# Patient Record
Sex: FEMALE | Race: WHITE | HISPANIC OR LATINO | Employment: UNEMPLOYED | URBAN - METROPOLITAN AREA
[De-identification: names, ages, dates, MRNs, and addresses within clinical notes are randomized per-mention and may not be internally consistent; named-entity substitution may affect disease eponyms.]

---

## 2018-03-04 ENCOUNTER — APPOINTMENT (EMERGENCY)
Dept: RADIOLOGY | Facility: HOSPITAL | Age: 43
End: 2018-03-04
Payer: COMMERCIAL

## 2018-03-04 ENCOUNTER — HOSPITAL ENCOUNTER (EMERGENCY)
Facility: HOSPITAL | Age: 43
Discharge: HOME | End: 2018-03-04
Attending: EMERGENCY MEDICINE | Admitting: EMERGENCY MEDICINE
Payer: COMMERCIAL

## 2018-03-04 VITALS
RESPIRATION RATE: 14 BRPM | HEART RATE: 90 BPM | WEIGHT: 155 LBS | DIASTOLIC BLOOD PRESSURE: 82 MMHG | SYSTOLIC BLOOD PRESSURE: 130 MMHG | HEIGHT: 64 IN | TEMPERATURE: 98.2 F | BODY MASS INDEX: 26.46 KG/M2 | OXYGEN SATURATION: 98 %

## 2018-03-04 DIAGNOSIS — R73.9 HYPERGLYCEMIA: ICD-10-CM

## 2018-03-04 DIAGNOSIS — J20.9 ACUTE BRONCHITIS: Primary | ICD-10-CM

## 2018-03-04 DIAGNOSIS — R07.89 CHEST TIGHTNESS: ICD-10-CM

## 2018-03-04 LAB
ALBUMIN SERPL BCP-MCNC: 3 G/DL (ref 3.5–5)
ALP SERPL-CCNC: 76 U/L (ref 46–116)
ALT SERPL W P-5'-P-CCNC: 31 U/L (ref 12–78)
ANION GAP SERPL CALCULATED.3IONS-SCNC: 11 MMOL/L (ref 4–13)
AST SERPL W P-5'-P-CCNC: 19 U/L (ref 5–45)
ATRIAL RATE: 80 BPM
BASOPHILS # BLD AUTO: 0.03 THOUSANDS/ΜL (ref 0–0.1)
BASOPHILS NFR BLD AUTO: 1 % (ref 0–1)
BILIRUB DIRECT SERPL-MCNC: 0.18 MG/DL (ref 0–0.2)
BILIRUB SERPL-MCNC: 1.1 MG/DL (ref 0.2–1)
BUN SERPL-MCNC: 9 MG/DL (ref 5–25)
CALCIUM SERPL-MCNC: 9.1 MG/DL (ref 8.3–10.1)
CHLORIDE SERPL-SCNC: 101 MMOL/L (ref 100–108)
CLARITY, POC: CLEAR
CO2 SERPL-SCNC: 24 MMOL/L (ref 21–32)
COLOR, POC: YELLOW
CREAT SERPL-MCNC: 0.92 MG/DL (ref 0.6–1.3)
EOSINOPHIL # BLD AUTO: 0.11 THOUSAND/ΜL (ref 0–0.61)
EOSINOPHIL NFR BLD AUTO: 2 % (ref 0–6)
ERYTHROCYTE [DISTWIDTH] IN BLOOD BY AUTOMATED COUNT: 11.5 % (ref 11.6–15.1)
EXT BILIRUBIN, UA: NEGATIVE
EXT BLOOD URINE: NEGATIVE
EXT GLUCOSE, UA: NORMAL
EXT KETONES: NEGATIVE
EXT NITRITE, UA: NEGATIVE
EXT PH, UA: 6.5
EXT PREG TEST URINE: NEGATIVE
EXT PROTEIN, UA: NEGATIVE
EXT SPECIFIC GRAVITY, UA: 1.01
EXT UROBILINOGEN: 0.2
GFR SERPL CREATININE-BSD FRML MDRD: 78 ML/MIN/1.73SQ M
GLUCOSE SERPL-MCNC: 317 MG/DL (ref 65–140)
GLUCOSE SERPL-MCNC: 336 MG/DL (ref 65–140)
HCT VFR BLD AUTO: 41.3 % (ref 34.8–46.1)
HGB BLD-MCNC: 15.2 G/DL (ref 11.5–15.4)
LYMPHOCYTES # BLD AUTO: 2.77 THOUSANDS/ΜL (ref 0.6–4.47)
LYMPHOCYTES NFR BLD AUTO: 46 % (ref 14–44)
MCH RBC QN AUTO: 32.8 PG (ref 26.8–34.3)
MCHC RBC AUTO-ENTMCNC: 36.8 G/DL (ref 31.4–37.4)
MCV RBC AUTO: 89 FL (ref 82–98)
MONOCYTES # BLD AUTO: 0.4 THOUSAND/ΜL (ref 0.17–1.22)
MONOCYTES NFR BLD AUTO: 7 % (ref 4–12)
NEUTROPHILS # BLD AUTO: 2.68 THOUSANDS/ΜL (ref 1.85–7.62)
NEUTS SEG NFR BLD AUTO: 45 % (ref 43–75)
NRBC BLD AUTO-RTO: 0 /100 WBCS
P AXIS: 61 DEGREES
PLATELET # BLD AUTO: 237 THOUSANDS/UL (ref 149–390)
PMV BLD AUTO: 10.1 FL (ref 8.9–12.7)
POTASSIUM SERPL-SCNC: 3.7 MMOL/L (ref 3.5–5.3)
PR INTERVAL: 136 MS
PROT SERPL-MCNC: 7.6 G/DL (ref 6.4–8.2)
QRS AXIS: 80 DEGREES
QRSD INTERVAL: 84 MS
QT INTERVAL: 404 MS
QTC INTERVAL: 465 MS
RBC # BLD AUTO: 4.64 MILLION/UL (ref 3.81–5.12)
SODIUM SERPL-SCNC: 136 MMOL/L (ref 136–145)
T WAVE AXIS: 57 DEGREES
TROPONIN I SERPL-MCNC: <0.02 NG/ML
VENTRICULAR RATE: 80 BPM
WBC # BLD AUTO: 6.02 THOUSAND/UL (ref 4.31–10.16)
WBC # BLD EST: NEGATIVE 10*3/UL

## 2018-03-04 PROCEDURE — 80048 BASIC METABOLIC PNL TOTAL CA: CPT | Performed by: PHYSICIAN ASSISTANT

## 2018-03-04 PROCEDURE — 36415 COLL VENOUS BLD VENIPUNCTURE: CPT | Performed by: PHYSICIAN ASSISTANT

## 2018-03-04 PROCEDURE — 71046 X-RAY EXAM CHEST 2 VIEWS: CPT

## 2018-03-04 PROCEDURE — 96374 THER/PROPH/DIAG INJ IV PUSH: CPT

## 2018-03-04 PROCEDURE — 82948 REAGENT STRIP/BLOOD GLUCOSE: CPT

## 2018-03-04 PROCEDURE — 81025 URINE PREGNANCY TEST: CPT | Performed by: PHYSICIAN ASSISTANT

## 2018-03-04 PROCEDURE — 85025 COMPLETE CBC W/AUTO DIFF WBC: CPT | Performed by: PHYSICIAN ASSISTANT

## 2018-03-04 PROCEDURE — 93005 ELECTROCARDIOGRAM TRACING: CPT

## 2018-03-04 PROCEDURE — 80076 HEPATIC FUNCTION PANEL: CPT | Performed by: PHYSICIAN ASSISTANT

## 2018-03-04 PROCEDURE — 81002 URINALYSIS NONAUTO W/O SCOPE: CPT | Performed by: PHYSICIAN ASSISTANT

## 2018-03-04 PROCEDURE — 96361 HYDRATE IV INFUSION ADD-ON: CPT

## 2018-03-04 PROCEDURE — 94640 AIRWAY INHALATION TREATMENT: CPT

## 2018-03-04 PROCEDURE — 99285 EMERGENCY DEPT VISIT HI MDM: CPT

## 2018-03-04 PROCEDURE — 93010 ELECTROCARDIOGRAM REPORT: CPT | Performed by: INTERNAL MEDICINE

## 2018-03-04 PROCEDURE — 84484 ASSAY OF TROPONIN QUANT: CPT | Performed by: PHYSICIAN ASSISTANT

## 2018-03-04 RX ORDER — KETOROLAC TROMETHAMINE 30 MG/ML
15 INJECTION, SOLUTION INTRAMUSCULAR; INTRAVENOUS ONCE
Status: COMPLETED | OUTPATIENT
Start: 2018-03-04 | End: 2018-03-04

## 2018-03-04 RX ORDER — ALBUTEROL SULFATE 2.5 MG/3ML
5 SOLUTION RESPIRATORY (INHALATION) ONCE
Status: COMPLETED | OUTPATIENT
Start: 2018-03-04 | End: 2018-03-04

## 2018-03-04 RX ADMIN — ALBUTEROL SULFATE 5 MG: 2.5 SOLUTION RESPIRATORY (INHALATION) at 15:20

## 2018-03-04 RX ADMIN — KETOROLAC TROMETHAMINE 15 MG: 30 INJECTION, SOLUTION INTRAMUSCULAR at 16:54

## 2018-03-04 RX ADMIN — SODIUM CHLORIDE 1000 ML: 0.9 INJECTION, SOLUTION INTRAVENOUS at 16:51

## 2018-03-04 RX ADMIN — METFORMIN HYDROCHLORIDE 1000 MG: 500 TABLET, FILM COATED ORAL at 18:59

## 2018-03-04 RX ADMIN — IPRATROPIUM BROMIDE 0.5 MG: 0.5 SOLUTION RESPIRATORY (INHALATION) at 15:20

## 2018-03-04 NOTE — DISCHARGE INSTRUCTIONS
Return to the Emergency Department sooner if increased pain, fever, vomiting, difficulty breathing, rash  Acute Bronchitis   WHAT YOU NEED TO KNOW:   Acute bronchitis is swelling and irritation in the air passages of your lungs  This irritation may cause you to cough or have other breathing problems  Acute bronchitis often starts because of another illness, such as a cold or the flu  The illness spreads from your nose and throat to your windpipe and airways  Bronchitis is often called a chest cold  Acute bronchitis lasts about 3 to 6 weeks and is usually not a serious illness  Your cough can last for several weeks  DISCHARGE INSTRUCTIONS:   Return to the emergency department if:   · You cough up blood  · Your lips or fingernails turn blue  · You feel like you are not getting enough air when you breathe  Contact your healthcare provider if:   · You have a fever  · Your breathing problems do not go away or get worse  · Your cough does not get better within 4 weeks  · You have questions or concerns about your condition or care  Self-care:   · Get more rest   Rest helps your body to heal  Slowly start to do more each day  Rest when you feel it is needed  · Avoid irritants in the air  Avoid chemicals, fumes, and dust  Wear a face mask if you must work around dust or fumes  Stay inside on days when air pollution levels are high  If you have allergies, stay inside when pollen counts are high  Do not use aerosol products, such as spray-on deodorant, bug spray, and hair spray  · Do not smoke or be around others who smoke  Nicotine and other chemicals in cigarettes and cigars damages the cilia that move mucus out of your lungs  Ask your healthcare provider for information if you currently smoke and need help to quit  E-cigarettes or smokeless tobacco still contain nicotine  Talk to your healthcare provider before you use these products  · Drink liquids as directed    Liquids help keep your air passages moist and help you cough up mucus  You may need to drink more liquids when you have acute bronchitis  Ask how much liquid to drink each day and which liquids are best for you  · Use a humidifier or vaporizer  Use a cool mist humidifier or a vaporizer to increase air moisture in your home  This may make it easier for you to breathe and help decrease your cough  Decrease risk for acute bronchitis:   · Get the vaccinations you need  Ask your healthcare provider if you should get vaccinated against the flu or pneumonia  · Prevent the spread of germs  You can decrease your risk of acute bronchitis and other illnesses by doing the following:     [facility] your hands often with soap and water  Carry germ-killing hand lotion or gel with you  You can use the lotion or gel to clean your hands when soap and water are not available  ¨ Do not touch your eyes, nose, or mouth unless you have washed your hands first     ¨ Always cover your mouth when you cough to prevent the spread of germs  It is best to cough into a tissue or your shirt sleeve instead of into your hand  Ask those around you cover their mouths when they cough  ¨ Try to avoid people who have a cold or the flu  If you are sick, stay away from others as much as possible  Medicines: Your healthcare provider may  give you any of the following:  · Ibuprofen or acetaminophen  are medicines that help lower your fever  They are available without a doctor's order  Ask your healthcare provider which medicine is right for you  Ask how much to take and how often to take it  Follow directions  These medicines can cause stomach bleeding if not taken correctly  Ibuprofen can cause kidney damage  Do not take ibuprofen if you have kidney disease, an ulcer, or allergies to aspirin  Acetaminophen can cause liver damage  Do not take more than 4,000 milligrams in 24 hours  · Decongestants  help loosen mucus in your lungs and make it easier to cough up  This can help you breathe easier  · Cough suppressants  decrease your urge to cough  If your cough produces mucus, do not take a cough suppressant unless your healthcare provider tells you to  Your healthcare provider may suggest that you take a cough suppressant at night so you can rest     · Inhalers  may be given  Your healthcare provider may give you one or more inhalers to help you breathe easier and cough less  An inhaler gives your medicine to open your airways  Ask your healthcare provider to show you how to use your inhaler correctly  · Take your medicine as directed  Contact your healthcare provider if you think your medicine is not helping or if you have side effects  Tell him of her if you are allergic to any medicine  Keep a list of the medicines, vitamins, and herbs you take  Include the amounts, and when and why you take them  Bring the list or the pill bottles to follow-up visits  Carry your medicine list with you in case of an emergency  Follow up with your healthcare provider as directed:  Write down questions you have so you will remember to ask them during your follow-up visits  © 2017 [address] [employer] Information is for End User's use only and may not be sold, redistributed or otherwise used for commercial purposes  All illustrations and images included in CareNotes® are the copyrighted property of [employer]  or [employer]  The above information is an  only  It is not intended as medical advice for individual conditions or treatments  Talk to your doctor, nurse or pharmacist before following any medical regimen to see if it is safe and effective for you  Chest Pain   WHAT YOU NEED TO KNOW:   What causes chest pain? Chest pain can be caused by a range of conditions, from not serious to life-threatening  Chest pain can be a symptom of a digestive problem, such as acid reflux or a stomach ulcer   An anxiety attack or a strong emotion, such as anger, can also cause chest pain  Infection, inflammation, or a fracture in the bones or cartilage in your chest can cause pain or discomfort  Sometimes chest pain or pressure is caused by poor blood flow to your heart (angina)  Chest pain may also be caused by life-threatening conditions such as a heart attack or blood clot in your lungs  What other symptoms might I have with chest pain? · A burning feeling behind your breastbone    · A racing or slow heartbeat     · Fever or sweating     · Nausea or vomiting     · Shortness of breath     · Discomfort or pressure that spreads from your chest to your back, jaw, or arm     · Feeling weak, tired, or faint  How is the cause of chest pain diagnosed? Your healthcare provider will examine you  Describe your chest pain in as much detail as possible  Tell him or her where your pain is and when it began  Tell the provider if you notice anything that makes the pain worse or better  Tell him or her if it is constant or comes and goes  Your healthcare provider will ask about any medicines you use and medical conditions you have  He or she will also examine you  You may also need any of the following tests:  · An EKG  is a test that records your heart's electrical activity  · Blood tests  check for heart damage and signs of a heart attack  · An echocardiogram  uses sound waves to see if blood is flowing normally through your heart  · An ultrasound, x-ray, CT, or MRI scan  may show the cause of your chest pain  You may be given contrast liquid to help your heart show up better in the pictures  Tell the healthcare provider if you have ever had an allergic reaction to contrast liquid  Do not enter the MRI room with anything metal  Metal can cause serious injury  Tell the healthcare provider if you have any metal in or on your body  · An endoscopy  may be done to check for ulcers or problem with your esophagus  How is chest pain treated?   Medicines may be given to treat the cause of your chest pain  Examples include pain medicine, anxiety medicine, or medicines to increase blood flow to your heart  Do not  take certain medicines without asking your healthcare provider first  These include NSAIDs, herbal or vitamin supplements, or hormones (estrogen or progestin)  What are some healthy living tips? The following are general healthy guidelines  If your chest pain is caused by a heart problem, your healthcare provider will give you specific guidelines to follow  · Do not smoke  Nicotine and other chemicals in cigarettes and cigars can cause lung and heart damage  Ask your healthcare provider for information if you currently smoke and need help to quit  E-cigarettes or smokeless tobacco still contain nicotine  Talk to your healthcare provider before you use these products  · Eat a variety of healthy, low-fat foods  Healthy foods include fruits, vegetables, whole-grain breads, low-fat dairy products, beans, lean meats, and fish  Ask for more information about a heart healthy diet  · Ask about activity  Your healthcare provider will tell you which activities to limit or avoid  Ask when you can drive, return to work, and have sex  Ask about the best exercise plan for you  · Maintain a healthy weight  Ask your healthcare provider how much you should weigh  Ask him or her to help you create a weight loss plan if you are overweight  Call 911 if:   · You have any of the following signs of a heart attack:      ¨ Squeezing, pressure, or pain in your chest that lasts longer than 5 minutes or returns    ¨ Discomfort or pain in your back, neck, jaw, stomach, or arm     ¨ Trouble breathing    ¨ Nausea or vomiting    ¨ Lightheadedness or a sudden cold sweat, especially with chest pain or trouble breathing    When should I seek immediate care? · You have chest discomfort that gets worse, even with medicine  · You cough or vomit blood       · Your bowel movements are black or bloody  · You cannot stop vomiting, or it hurts to swallow  When should I contact my healthcare provider? · You have questions or concerns about your condition or care  CARE AGREEMENT:   You have the right to help plan your care  Learn about your health condition and how it may be treated  Discuss treatment options with your caregivers to decide what care you want to receive  You always have the right to refuse treatment  The above information is an  only  It is not intended as medical advice for individual conditions or treatments  Talk to your doctor, nurse or pharmacist before following any medical regimen to see if it is safe and effective for you  © 2017 [address] [facility] Information is for End User's use only and may not be sold, redistributed or otherwise used for commercial purposes  All illustrations and images included in CareNotes® are the copyrighted property of A D A M , Inc  or [employer]

## 2018-10-19 ENCOUNTER — HOSPITAL ENCOUNTER (INPATIENT)
Facility: HOSPITAL | Age: 43
LOS: 6 days | Discharge: HOME | DRG: 392 | End: 2018-10-27
Attending: EMERGENCY MEDICINE | Admitting: INTERNAL MEDICINE
Payer: COMMERCIAL

## 2018-10-19 ENCOUNTER — APPOINTMENT (EMERGENCY)
Dept: CT IMAGING | Facility: HOSPITAL | Age: 43
DRG: 392 | End: 2018-10-19
Payer: COMMERCIAL

## 2018-10-19 DIAGNOSIS — R10.9 ABDOMINAL PAIN: ICD-10-CM

## 2018-10-19 DIAGNOSIS — K63.9 BOWEL WALL THICKENING: ICD-10-CM

## 2018-10-19 DIAGNOSIS — K52.9 GASTROENTERITIS, ACUTE: ICD-10-CM

## 2018-10-19 DIAGNOSIS — K52.9 COLITIS, ACUTE: ICD-10-CM

## 2018-10-19 DIAGNOSIS — R73.9 HYPERGLYCEMIA: ICD-10-CM

## 2018-10-19 DIAGNOSIS — R19.7 DIARRHEA: ICD-10-CM

## 2018-10-19 DIAGNOSIS — Z79.4 TYPE 2 DIABETES MELLITUS WITH HYPERGLYCEMIA, WITH LONG-TERM CURRENT USE OF INSULIN (HCC): ICD-10-CM

## 2018-10-19 DIAGNOSIS — K52.9 ENTERITIS: Primary | ICD-10-CM

## 2018-10-19 DIAGNOSIS — K76.9 HEPATIC LESION: ICD-10-CM

## 2018-10-19 DIAGNOSIS — E11.65 TYPE 2 DIABETES MELLITUS WITH HYPERGLYCEMIA, WITH LONG-TERM CURRENT USE OF INSULIN (HCC): ICD-10-CM

## 2018-10-19 LAB
ACETONE SERPL-MCNC: NEGATIVE MG/DL
ALBUMIN SERPL BCP-MCNC: 3.8 G/DL (ref 3.5–5)
ALP SERPL-CCNC: 85 U/L (ref 46–116)
ALT SERPL W P-5'-P-CCNC: 35 U/L (ref 12–78)
ANION GAP SERPL CALCULATED.3IONS-SCNC: 14 MMOL/L (ref 4–13)
AST SERPL W P-5'-P-CCNC: 13 U/L (ref 5–45)
BACTERIA UR QL AUTO: ABNORMAL /HPF
BASE EX.OXY STD BLDV CALC-SCNC: 49.5 % (ref 60–80)
BASE EXCESS BLDV CALC-SCNC: -4 MMOL/L
BASOPHILS # BLD AUTO: 0.02 THOUSANDS/ΜL (ref 0–0.1)
BASOPHILS NFR BLD AUTO: 0 % (ref 0–1)
BILIRUB SERPL-MCNC: 3.5 MG/DL (ref 0.2–1)
BILIRUB UR QL STRIP: ABNORMAL
BUN SERPL-MCNC: 12 MG/DL (ref 5–25)
CALCIUM SERPL-MCNC: 8.9 MG/DL (ref 8.3–10.1)
CHLORIDE SERPL-SCNC: 94 MMOL/L (ref 100–108)
CLARITY UR: CLEAR
CO2 SERPL-SCNC: 23 MMOL/L (ref 21–32)
COLOR UR: YELLOW
CREAT SERPL-MCNC: 0.95 MG/DL (ref 0.6–1.3)
EOSINOPHIL # BLD AUTO: 0.12 THOUSAND/ΜL (ref 0–0.61)
EOSINOPHIL NFR BLD AUTO: 2 % (ref 0–6)
ERYTHROCYTE [DISTWIDTH] IN BLOOD BY AUTOMATED COUNT: 11.5 % (ref 11.6–15.1)
EXT PREG TEST URINE: NEGATIVE
GFR SERPL CREATININE-BSD FRML MDRD: 74 ML/MIN/1.73SQ M
GLUCOSE SERPL-MCNC: 347 MG/DL (ref 65–140)
GLUCOSE UR STRIP-MCNC: ABNORMAL MG/DL
HCO3 BLDV-SCNC: 22.2 MMOL/L (ref 24–30)
HCT VFR BLD AUTO: 47.3 % (ref 34.8–46.1)
HGB BLD-MCNC: 17.4 G/DL (ref 11.5–15.4)
HGB UR QL STRIP.AUTO: NEGATIVE
IMM GRANULOCYTES # BLD AUTO: 0.04 THOUSAND/UL (ref 0–0.2)
IMM GRANULOCYTES NFR BLD AUTO: 1 % (ref 0–2)
KETONES UR STRIP-MCNC: ABNORMAL MG/DL
LEUKOCYTE ESTERASE UR QL STRIP: ABNORMAL
LIPASE SERPL-CCNC: 85 U/L (ref 73–393)
LYMPHOCYTES # BLD AUTO: 1.52 THOUSANDS/ΜL (ref 0.6–4.47)
LYMPHOCYTES NFR BLD AUTO: 24 % (ref 14–44)
MCH RBC QN AUTO: 33.5 PG (ref 26.8–34.3)
MCHC RBC AUTO-ENTMCNC: 36.8 G/DL (ref 31.4–37.4)
MCV RBC AUTO: 91 FL (ref 82–98)
MONOCYTES # BLD AUTO: 0.69 THOUSAND/ΜL (ref 0.17–1.22)
MONOCYTES NFR BLD AUTO: 11 % (ref 4–12)
NEUTROPHILS # BLD AUTO: 3.86 THOUSANDS/ΜL (ref 1.85–7.62)
NEUTS SEG NFR BLD AUTO: 62 % (ref 43–75)
NITRITE UR QL STRIP: NEGATIVE
NON-SQ EPI CELLS URNS QL MICRO: ABNORMAL /HPF
NRBC BLD AUTO-RTO: 0 /100 WBCS
O2 CT BLDV-SCNC: 13 ML/DL
PCO2 BLDV: 43.9 MM HG (ref 42–50)
PH BLDV: 7.32 [PH] (ref 7.3–7.4)
PH UR STRIP.AUTO: 6 [PH] (ref 4.5–8)
PLATELET # BLD AUTO: 231 THOUSANDS/UL (ref 149–390)
PMV BLD AUTO: 10.4 FL (ref 8.9–12.7)
PO2 BLDV: 25.9 MM HG (ref 35–45)
POTASSIUM SERPL-SCNC: 3.7 MMOL/L (ref 3.5–5.3)
PROT SERPL-MCNC: 8.3 G/DL (ref 6.4–8.2)
PROT UR STRIP-MCNC: NEGATIVE MG/DL
RBC # BLD AUTO: 5.19 MILLION/UL (ref 3.81–5.12)
RBC #/AREA URNS AUTO: ABNORMAL /HPF
SODIUM SERPL-SCNC: 131 MMOL/L (ref 136–145)
SP GR UR STRIP.AUTO: 1.01 (ref 1–1.03)
TROPONIN I SERPL-MCNC: <0.02 NG/ML
UROBILINOGEN UR QL STRIP.AUTO: 0.2 E.U./DL
WBC # BLD AUTO: 6.25 THOUSAND/UL (ref 4.31–10.16)
WBC #/AREA URNS AUTO: ABNORMAL /HPF

## 2018-10-19 PROCEDURE — 96375 TX/PRO/DX INJ NEW DRUG ADDON: CPT

## 2018-10-19 PROCEDURE — 96361 HYDRATE IV INFUSION ADD-ON: CPT

## 2018-10-19 PROCEDURE — 83930 ASSAY OF BLOOD OSMOLALITY: CPT | Performed by: EMERGENCY MEDICINE

## 2018-10-19 PROCEDURE — 81001 URINALYSIS AUTO W/SCOPE: CPT | Performed by: EMERGENCY MEDICINE

## 2018-10-19 PROCEDURE — 85025 COMPLETE CBC W/AUTO DIFF WBC: CPT | Performed by: EMERGENCY MEDICINE

## 2018-10-19 PROCEDURE — 83690 ASSAY OF LIPASE: CPT | Performed by: EMERGENCY MEDICINE

## 2018-10-19 PROCEDURE — 36415 COLL VENOUS BLD VENIPUNCTURE: CPT | Performed by: EMERGENCY MEDICINE

## 2018-10-19 PROCEDURE — 81025 URINE PREGNANCY TEST: CPT | Performed by: EMERGENCY MEDICINE

## 2018-10-19 PROCEDURE — 80053 COMPREHEN METABOLIC PANEL: CPT | Performed by: EMERGENCY MEDICINE

## 2018-10-19 PROCEDURE — 84484 ASSAY OF TROPONIN QUANT: CPT | Performed by: EMERGENCY MEDICINE

## 2018-10-19 PROCEDURE — 82009 KETONE BODYS QUAL: CPT | Performed by: EMERGENCY MEDICINE

## 2018-10-19 PROCEDURE — 74177 CT ABD & PELVIS W/CONTRAST: CPT

## 2018-10-19 PROCEDURE — 99285 EMERGENCY DEPT VISIT HI MDM: CPT

## 2018-10-19 PROCEDURE — 82805 BLOOD GASES W/O2 SATURATION: CPT | Performed by: EMERGENCY MEDICINE

## 2018-10-19 PROCEDURE — 96374 THER/PROPH/DIAG INJ IV PUSH: CPT

## 2018-10-19 RX ORDER — LOSARTAN POTASSIUM 25 MG/1
TABLET ORAL
COMMUNITY
Start: 2018-06-14 | End: 2020-09-16 | Stop reason: ALTCHOICE

## 2018-10-19 RX ORDER — ATORVASTATIN CALCIUM 80 MG/1
TABLET, FILM COATED ORAL
COMMUNITY
Start: 2018-06-14 | End: 2020-09-16 | Stop reason: SDUPTHER

## 2018-10-19 RX ORDER — ALBUTEROL SULFATE 90 UG/1
AEROSOL, METERED RESPIRATORY (INHALATION)
COMMUNITY
Start: 2018-06-18 | End: 2020-09-16 | Stop reason: SDUPTHER

## 2018-10-19 RX ORDER — DICYCLOMINE HCL 20 MG
20 TABLET ORAL ONCE
Status: COMPLETED | OUTPATIENT
Start: 2018-10-20 | End: 2018-10-20

## 2018-10-19 RX ORDER — LANCETS
EACH MISCELLANEOUS
COMMUNITY
Start: 2018-09-20

## 2018-10-19 RX ORDER — BLOOD-GLUCOSE METER
EACH MISCELLANEOUS
COMMUNITY
Start: 2018-06-14

## 2018-10-19 RX ORDER — ONDANSETRON 2 MG/ML
4 INJECTION INTRAMUSCULAR; INTRAVENOUS ONCE
Status: COMPLETED | OUTPATIENT
Start: 2018-10-19 | End: 2018-10-19

## 2018-10-19 RX ORDER — HYDROMORPHONE HCL/PF 1 MG/ML
0.5 SYRINGE (ML) INJECTION ONCE
Status: COMPLETED | OUTPATIENT
Start: 2018-10-19 | End: 2018-10-19

## 2018-10-19 RX ORDER — MONTELUKAST SODIUM 10 MG/1
TABLET ORAL
COMMUNITY
Start: 2018-06-14 | End: 2020-09-16 | Stop reason: SDUPTHER

## 2018-10-19 RX ORDER — AMITRIPTYLINE HYDROCHLORIDE 50 MG/1
TABLET, FILM COATED ORAL
COMMUNITY
Start: 2018-06-14 | End: 2020-09-16 | Stop reason: SDUPTHER

## 2018-10-19 RX ADMIN — ONDANSETRON 4 MG: 2 INJECTION INTRAMUSCULAR; INTRAVENOUS at 22:23

## 2018-10-19 RX ADMIN — IOHEXOL 100 ML: 350 INJECTION, SOLUTION INTRAVENOUS at 23:23

## 2018-10-19 RX ADMIN — SODIUM CHLORIDE 1000 ML: 0.9 INJECTION, SOLUTION INTRAVENOUS at 22:23

## 2018-10-19 RX ADMIN — HYDROMORPHONE HYDROCHLORIDE 0.5 MG: 1 INJECTION, SOLUTION INTRAMUSCULAR; INTRAVENOUS; SUBCUTANEOUS at 22:23

## 2018-10-20 PROBLEM — Z79.4 TYPE 2 DIABETES MELLITUS WITH HYPERGLYCEMIA, WITH LONG-TERM CURRENT USE OF INSULIN (HCC): Status: ACTIVE | Noted: 2018-10-20

## 2018-10-20 PROBLEM — K76.9 HEPATIC LESION: Status: ACTIVE | Noted: 2018-10-20

## 2018-10-20 PROBLEM — R10.9 ABDOMINAL PAIN: Status: ACTIVE | Noted: 2018-10-20

## 2018-10-20 PROBLEM — K52.9 GASTROENTERITIS, ACUTE: Status: ACTIVE | Noted: 2018-10-20

## 2018-10-20 PROBLEM — R73.9 HYPERGLYCEMIA: Status: ACTIVE | Noted: 2018-10-20

## 2018-10-20 PROBLEM — E11.65 TYPE 2 DIABETES MELLITUS WITH HYPERGLYCEMIA, WITH LONG-TERM CURRENT USE OF INSULIN (HCC): Status: ACTIVE | Noted: 2018-10-20

## 2018-10-20 PROBLEM — R00.0 SINUS TACHYCARDIA: Status: ACTIVE | Noted: 2018-10-20

## 2018-10-20 PROBLEM — R19.7 DIARRHEA: Status: ACTIVE | Noted: 2018-10-20

## 2018-10-20 PROBLEM — E16.2 HYPOGLYCEMIA: Status: ACTIVE | Noted: 2018-10-20

## 2018-10-20 LAB
ANION GAP SERPL CALCULATED.3IONS-SCNC: 12 MMOL/L (ref 4–13)
BASOPHILS # BLD AUTO: 0.02 THOUSANDS/ΜL (ref 0–0.1)
BASOPHILS NFR BLD AUTO: 0 % (ref 0–1)
BUN SERPL-MCNC: 7 MG/DL (ref 5–25)
C DIFF TOX GENS STL QL NAA+PROBE: NORMAL
CALCIUM SERPL-MCNC: 7.6 MG/DL (ref 8.3–10.1)
CAMPYLOBACTER DNA SPEC NAA+PROBE: NORMAL
CHLORIDE SERPL-SCNC: 103 MMOL/L (ref 100–108)
CO2 SERPL-SCNC: 23 MMOL/L (ref 21–32)
CREAT SERPL-MCNC: 0.59 MG/DL (ref 0.6–1.3)
CRP SERPL QL: 64.2 MG/L
EOSINOPHIL # BLD AUTO: 0.17 THOUSAND/ΜL (ref 0–0.61)
EOSINOPHIL NFR BLD AUTO: 4 % (ref 0–6)
ERYTHROCYTE [DISTWIDTH] IN BLOOD BY AUTOMATED COUNT: 11.7 % (ref 11.6–15.1)
ERYTHROCYTE [SEDIMENTATION RATE] IN BLOOD: 11 MM/HOUR (ref 0–20)
GFR SERPL CREATININE-BSD FRML MDRD: 113 ML/MIN/1.73SQ M
GLUCOSE P FAST SERPL-MCNC: 234 MG/DL (ref 65–99)
GLUCOSE SERPL-MCNC: 145 MG/DL (ref 65–140)
GLUCOSE SERPL-MCNC: 180 MG/DL (ref 65–140)
GLUCOSE SERPL-MCNC: 205 MG/DL (ref 65–140)
GLUCOSE SERPL-MCNC: 211 MG/DL (ref 65–140)
GLUCOSE SERPL-MCNC: 234 MG/DL (ref 65–140)
HCT VFR BLD AUTO: 37.9 % (ref 34.8–46.1)
HGB BLD-MCNC: 13.9 G/DL (ref 11.5–15.4)
IMM GRANULOCYTES # BLD AUTO: 0.03 THOUSAND/UL (ref 0–0.2)
IMM GRANULOCYTES NFR BLD AUTO: 1 % (ref 0–2)
LACTATE SERPL-SCNC: 0.9 MMOL/L (ref 0.5–2)
LYMPHOCYTES # BLD AUTO: 2 THOUSANDS/ΜL (ref 0.6–4.47)
LYMPHOCYTES NFR BLD AUTO: 43 % (ref 14–44)
MAGNESIUM SERPL-MCNC: 1.5 MG/DL (ref 1.6–2.6)
MCH RBC QN AUTO: 33.9 PG (ref 26.8–34.3)
MCHC RBC AUTO-ENTMCNC: 36.7 G/DL (ref 31.4–37.4)
MCV RBC AUTO: 92 FL (ref 82–98)
MONOCYTES # BLD AUTO: 0.58 THOUSAND/ΜL (ref 0.17–1.22)
MONOCYTES NFR BLD AUTO: 12 % (ref 4–12)
NEUTROPHILS # BLD AUTO: 1.89 THOUSANDS/ΜL (ref 1.85–7.62)
NEUTS SEG NFR BLD AUTO: 40 % (ref 43–75)
NRBC BLD AUTO-RTO: 0 /100 WBCS
OSMOLALITY UR/SERPL-RTO: 290 MMOL/KG (ref 282–298)
PHOSPHATE SERPL-MCNC: 2.2 MG/DL (ref 2.7–4.5)
PLATELET # BLD AUTO: 181 THOUSANDS/UL (ref 149–390)
PMV BLD AUTO: 10.5 FL (ref 8.9–12.7)
POTASSIUM SERPL-SCNC: 3.1 MMOL/L (ref 3.5–5.3)
RBC # BLD AUTO: 4.1 MILLION/UL (ref 3.81–5.12)
SALMONELLA DNA SPEC QL NAA+PROBE: NORMAL
SHIGA TOXIN STX GENE SPEC NAA+PROBE: NORMAL
SHIGELLA DNA SPEC QL NAA+PROBE: NORMAL
SODIUM SERPL-SCNC: 138 MMOL/L (ref 136–145)
WBC # BLD AUTO: 4.69 THOUSAND/UL (ref 4.31–10.16)

## 2018-10-20 PROCEDURE — 84100 ASSAY OF PHOSPHORUS: CPT | Performed by: HOSPITALIST

## 2018-10-20 PROCEDURE — 86140 C-REACTIVE PROTEIN: CPT | Performed by: HOSPITALIST

## 2018-10-20 PROCEDURE — 82948 REAGENT STRIP/BLOOD GLUCOSE: CPT

## 2018-10-20 PROCEDURE — 80048 BASIC METABOLIC PNL TOTAL CA: CPT | Performed by: HOSPITALIST

## 2018-10-20 PROCEDURE — 36415 COLL VENOUS BLD VENIPUNCTURE: CPT | Performed by: HOSPITALIST

## 2018-10-20 PROCEDURE — 99220 PR INITIAL OBSERVATION CARE/DAY 70 MINUTES: CPT | Performed by: HOSPITALIST

## 2018-10-20 PROCEDURE — 83605 ASSAY OF LACTIC ACID: CPT | Performed by: PHYSICIAN ASSISTANT

## 2018-10-20 PROCEDURE — 89055 LEUKOCYTE ASSESSMENT FECAL: CPT | Performed by: EMERGENCY MEDICINE

## 2018-10-20 PROCEDURE — 87493 C DIFF AMPLIFIED PROBE: CPT | Performed by: EMERGENCY MEDICINE

## 2018-10-20 PROCEDURE — 99204 OFFICE O/P NEW MOD 45 MIN: CPT | Performed by: INTERNAL MEDICINE

## 2018-10-20 PROCEDURE — 83735 ASSAY OF MAGNESIUM: CPT | Performed by: HOSPITALIST

## 2018-10-20 PROCEDURE — 87505 NFCT AGENT DETECTION GI: CPT | Performed by: EMERGENCY MEDICINE

## 2018-10-20 PROCEDURE — 90686 IIV4 VACC NO PRSV 0.5 ML IM: CPT | Performed by: INTERNAL MEDICINE

## 2018-10-20 PROCEDURE — 85652 RBC SED RATE AUTOMATED: CPT | Performed by: HOSPITALIST

## 2018-10-20 PROCEDURE — 85025 COMPLETE CBC W/AUTO DIFF WBC: CPT | Performed by: HOSPITALIST

## 2018-10-20 RX ORDER — FLUTICASONE FUROATE AND VILANTEROL 100; 25 UG/1; UG/1
1 POWDER RESPIRATORY (INHALATION)
Status: DISCONTINUED | OUTPATIENT
Start: 2018-10-20 | End: 2018-10-27 | Stop reason: HOSPADM

## 2018-10-20 RX ORDER — INSULIN GLARGINE 100 [IU]/ML
10 INJECTION, SOLUTION SUBCUTANEOUS
Status: DISCONTINUED | OUTPATIENT
Start: 2018-10-20 | End: 2018-10-21

## 2018-10-20 RX ORDER — DICYCLOMINE HCL 20 MG
20 TABLET ORAL
Status: DISCONTINUED | OUTPATIENT
Start: 2018-10-20 | End: 2018-10-27 | Stop reason: HOSPADM

## 2018-10-20 RX ORDER — CIPROFLOXACIN 500 MG/1
500 TABLET, FILM COATED ORAL EVERY 12 HOURS SCHEDULED
Status: DISCONTINUED | OUTPATIENT
Start: 2018-10-20 | End: 2018-10-27

## 2018-10-20 RX ORDER — ASPIRIN 81 MG/1
81 TABLET, CHEWABLE ORAL DAILY
Status: DISCONTINUED | OUTPATIENT
Start: 2018-10-20 | End: 2018-10-27 | Stop reason: HOSPADM

## 2018-10-20 RX ORDER — SODIUM CHLORIDE 9 MG/ML
100 INJECTION, SOLUTION INTRAVENOUS CONTINUOUS
Status: DISCONTINUED | OUTPATIENT
Start: 2018-10-20 | End: 2018-10-27

## 2018-10-20 RX ORDER — POTASSIUM CHLORIDE 20MEQ/15ML
40 LIQUID (ML) ORAL ONCE
Status: COMPLETED | OUTPATIENT
Start: 2018-10-20 | End: 2018-10-20

## 2018-10-20 RX ORDER — MAGNESIUM SULFATE HEPTAHYDRATE 40 MG/ML
2 INJECTION, SOLUTION INTRAVENOUS ONCE
Status: COMPLETED | OUTPATIENT
Start: 2018-10-20 | End: 2018-10-20

## 2018-10-20 RX ORDER — LOSARTAN POTASSIUM 25 MG/1
25 TABLET ORAL DAILY
Status: DISCONTINUED | OUTPATIENT
Start: 2018-10-20 | End: 2018-10-27 | Stop reason: HOSPADM

## 2018-10-20 RX ORDER — HYDROMORPHONE HCL/PF 1 MG/ML
0.5 SYRINGE (ML) INJECTION
Status: DISCONTINUED | OUTPATIENT
Start: 2018-10-20 | End: 2018-10-25

## 2018-10-20 RX ORDER — ATORVASTATIN CALCIUM 40 MG/1
80 TABLET, FILM COATED ORAL
Status: DISCONTINUED | OUTPATIENT
Start: 2018-10-20 | End: 2018-10-27 | Stop reason: HOSPADM

## 2018-10-20 RX ORDER — METRONIDAZOLE 500 MG/1
500 TABLET ORAL EVERY 8 HOURS SCHEDULED
Status: DISCONTINUED | OUTPATIENT
Start: 2018-10-20 | End: 2018-10-27 | Stop reason: HOSPADM

## 2018-10-20 RX ORDER — MONTELUKAST SODIUM 10 MG/1
10 TABLET ORAL DAILY
Status: DISCONTINUED | OUTPATIENT
Start: 2018-10-20 | End: 2018-10-27 | Stop reason: HOSPADM

## 2018-10-20 RX ORDER — HYDROMORPHONE HCL/PF 1 MG/ML
0.5 SYRINGE (ML) INJECTION ONCE
Status: COMPLETED | OUTPATIENT
Start: 2018-10-20 | End: 2018-10-20

## 2018-10-20 RX ORDER — ACETAMINOPHEN 325 MG/1
650 TABLET ORAL EVERY 6 HOURS PRN
Status: DISCONTINUED | OUTPATIENT
Start: 2018-10-20 | End: 2018-10-27 | Stop reason: HOSPADM

## 2018-10-20 RX ADMIN — SODIUM CHLORIDE 100 ML/HR: 0.9 INJECTION, SOLUTION INTRAVENOUS at 04:36

## 2018-10-20 RX ADMIN — METRONIDAZOLE 500 MG: 500 TABLET ORAL at 14:56

## 2018-10-20 RX ADMIN — CIPROFLOXACIN HYDROCHLORIDE 500 MG: 500 TABLET, FILM COATED ORAL at 22:04

## 2018-10-20 RX ADMIN — DICYCLOMINE HYDROCHLORIDE 20 MG: 20 TABLET ORAL at 15:31

## 2018-10-20 RX ADMIN — INFLUENZA VIRUS VACCINE 0.5 ML: 15; 15; 15; 15 SUSPENSION INTRAMUSCULAR at 14:56

## 2018-10-20 RX ADMIN — HYDROMORPHONE HYDROCHLORIDE 0.5 MG: 1 INJECTION, SOLUTION INTRAMUSCULAR; INTRAVENOUS; SUBCUTANEOUS at 18:40

## 2018-10-20 RX ADMIN — INSULIN LISPRO 1 UNITS: 100 INJECTION, SOLUTION INTRAVENOUS; SUBCUTANEOUS at 18:38

## 2018-10-20 RX ADMIN — CIPROFLOXACIN HYDROCHLORIDE 500 MG: 500 TABLET, FILM COATED ORAL at 12:28

## 2018-10-20 RX ADMIN — ASPIRIN 81 MG 81 MG: 81 TABLET ORAL at 10:05

## 2018-10-20 RX ADMIN — METFORMIN HYDROCHLORIDE 1000 MG: 500 TABLET, FILM COATED ORAL at 08:31

## 2018-10-20 RX ADMIN — LOSARTAN POTASSIUM 25 MG: 25 TABLET, FILM COATED ORAL at 10:57

## 2018-10-20 RX ADMIN — ENOXAPARIN SODIUM 40 MG: 40 INJECTION SUBCUTANEOUS at 10:11

## 2018-10-20 RX ADMIN — INSULIN GLARGINE 10 UNITS: 100 INJECTION, SOLUTION SUBCUTANEOUS at 22:04

## 2018-10-20 RX ADMIN — HYDROMORPHONE HYDROCHLORIDE 0.5 MG: 1 INJECTION, SOLUTION INTRAMUSCULAR; INTRAVENOUS; SUBCUTANEOUS at 13:27

## 2018-10-20 RX ADMIN — METRONIDAZOLE 500 MG: 500 TABLET ORAL at 22:04

## 2018-10-20 RX ADMIN — SODIUM CHLORIDE 1000 ML: 0.9 INJECTION, SOLUTION INTRAVENOUS at 00:54

## 2018-10-20 RX ADMIN — HYDROMORPHONE HYDROCHLORIDE 0.5 MG: 1 INJECTION, SOLUTION INTRAMUSCULAR; INTRAVENOUS; SUBCUTANEOUS at 22:07

## 2018-10-20 RX ADMIN — MAGNESIUM SULFATE IN WATER 2 G: 40 INJECTION, SOLUTION INTRAVENOUS at 15:32

## 2018-10-20 RX ADMIN — POTASSIUM CHLORIDE 40 MEQ: 20 SOLUTION ORAL at 15:31

## 2018-10-20 RX ADMIN — ATORVASTATIN CALCIUM 80 MG: 40 TABLET, FILM COATED ORAL at 17:22

## 2018-10-20 RX ADMIN — DICYCLOMINE HYDROCHLORIDE 20 MG: 20 TABLET ORAL at 22:05

## 2018-10-20 RX ADMIN — DICYCLOMINE HYDROCHLORIDE 20 MG: 20 TABLET ORAL at 00:54

## 2018-10-20 RX ADMIN — DICYCLOMINE HYDROCHLORIDE 20 MG: 20 TABLET ORAL at 11:50

## 2018-10-20 RX ADMIN — SODIUM CHLORIDE 100 ML/HR: 0.9 INJECTION, SOLUTION INTRAVENOUS at 19:32

## 2018-10-20 RX ADMIN — MONTELUKAST SODIUM 10 MG: 10 TABLET, FILM COATED ORAL at 10:57

## 2018-10-20 NOTE — ASSESSMENT & PLAN NOTE
With colitis, probably viral versus inflammatory, patient will be made NPO, consult Gastroenterology, will sent stool for evaluation, and follow up with chemistry and CBC in a m  ESR/C-reactive protein

## 2018-10-20 NOTE — ASSESSMENT & PLAN NOTE
Diffuse and persistent x5 days probably secondary to gastroenteritis versus inflammatory bowel disease, follow-up with GI, will make NPO, status post 2 L of normal saline in the emergency, continue normal saline at 100 cc/hour

## 2018-10-20 NOTE — CONSULTS
Consultation - [facility]) [facility]  Amalia Jalloh 44 y o  female MRN: [id number]  Unit/Bed#: ED 06 Encounter: [id number]        Inpatient consult to gastroenterology  Consult performed by: Elena Hurd ordered by: Jackson Jean          Reason for Consult / Principal Problem:     Reviewed CT scan for further evaluation of colitis      ASSESSMENT AND PLAN:      1  Colitis- likely viral versus bacteria versus parasitic   2  Abdominal pain due to colitis    Likely cause of her colitis is related to the bacterial versus viral infection  Due to acute nature of symptoms this is likely secondary to infectious etiology  We will start antibiotics on her despite not having leukocytosis since she is not improving and her symptoms are somewhat getting worse  Currently can start on clear liquids would avoid dairy and fiber  She could also get started on probiotics  I would check stool studies for community-acquired C diff, bacteria, parasite, Giardia  She can be considered for an outpatient colonoscopy  This will be done in 6-8 weeks after follow-up in the office  Will start clears  Advance diet as tolerated   ______________________________________________________________________    HPI:     She is a very pleasant 67-year-old female presents here with diarrhea  Diarrhea is nonbloody watery has been ongoing for several days  She has had 5 days of diarrhea denies any sick contact, travel, recent antibiotic use, recent hospitalization  She had no prior episode of similar symptoms  She has had a colonoscopy 10 years ago but has not had 1 recently  Currently she presents with abdominal pain associated with colitis  Abdominal pain is diffuse and CT scan showed left-sided colitis  She has had 16 and episode of diarrhea probably 10-15 episodes per day which is not slowing down  Denies any acute distress at this time  She has very limited p o  Intake due to this        REVIEW OF SYSTEMS:    CONSTITUTIONAL: Denies any fever, chills, rigors, and weight loss  HEENT: No earache or tinnitus  Denies hearing loss or visual disturbances  CARDIOVASCULAR: No chest pain or palpitations  RESPIRATORY: Denies any cough, hemoptysis, shortness of breath or dyspnea on exertion  GASTROINTESTINAL: As noted in the History of Present Illness  GENITOURINARY: No problems with urination  Denies any hematuria or dysuria  NEUROLOGIC: No dizziness or vertigo, denies headaches  MUSCULOSKELETAL: Denies any muscle or joint pain  SKIN: Denies skin rashes or itching  ENDOCRINE: Denies excessive thirst  Denies intolerance to heat or cold  PSYCHOSOCIAL: Denies depression or anxiety  Denies any recent memory loss  Historical Information   Past Medical History:   Diagnosis Date    Asthma     [facility]     Fibromyalgia      Past Surgical History:   Procedure Laterality Date     SECTION      CHOLECYSTECTOMY      TUBAL LIGATION       Social History   History   Alcohol Use No     History   Drug Use No     History   Smoking Status    Never Smoker   Smokeless Tobacco    Never Used     History reviewed  No pertinent family history      Meds/Allergies       (Not in a hospital admission)  Current Facility-Administered Medications   Medication Dose Route Frequency    aspirin chewable tablet 81 mg  81 mg Oral Daily    atorvastatin (LIPITOR) tablet 80 mg  80 mg Oral Daily With Dinner    dicyclomine (BENTYL) tablet 20 mg  20 mg Oral 4x Daily (AC & HS)    enoxaparin (LOVENOX) subcutaneous injection 40 mg  40 mg Subcutaneous Daily    fluticasone-vilanterol (BREO ELLIPTA) 100-25 mcg/inh inhaler 1 puff  1 puff Inhalation Daily    losartan (COZAAR) tablet 25 mg  25 mg Oral Daily    metFORMIN (GLUCOPHAGE) tablet 1,000 mg  1,000 mg Oral BID With Meals    montelukast (SINGULAIR) tablet 10 mg  10 mg Oral Daily    sodium chloride 0 9 % infusion  100 mL/hr Intravenous Continuous       No Known Allergies        Objective     Blood pressure 102/63, pulse 96, temperature 98 1 °F (36 7 °C), temperature source Oral, resp  rate 18, weight 72 6 kg (160 lb), last menstrual period 10/07/2018, SpO2 98 %  Body mass index is 27 46 kg/m²  Intake/Output Summary (Last 24 hours) at 10/20/18 1139  Last data filed at 10/20/18 0346   Gross per 24 hour   Intake             2000 ml   Output                0 ml   Net             2000 ml         PHYSICAL EXAM:      General Appearance:   Alert, cooperative, no distress   HEENT:   Normocephalic, atraumatic, anicteric      Neck:  Supple, symmetrical, trachea midline   Lungs:   Clear to auscultation bilaterally; no rales, rhonchi or wheezing; respirations unlabored    Heart[de-identified]   Regular rate and rhythm; no murmur, rub, or gallop  Abdomen:   Soft, diffuse abdominal discomfort, non-distended; normal bowel sounds; no masses, no organomegaly    Genitalia:   Deferred    Rectal:   Deferred    Extremities:  No cyanosis, clubbing or edema    Pulses:  2+ and symmetric all extremities    Skin:  No jaundice, rashes, or lesions    Lymph nodes:  No palpable cervical lymphadenopathy        Lab Results:   Admission on 10/19/2018   Component Date Value    Color, UA 10/19/2018 Yellow     Clarity, UA 10/19/2018 Clear     Specific Gravity, UA 10/19/2018 1 010     pH, UA 10/19/2018 6 0     Leukocytes, UA 10/19/2018 Elevated glucose may cause decreased leukocyte values   See urine microscopic for [facility] result/*    Nitrite, UA 10/19/2018 Negative     Protein, UA 10/19/2018 Negative     Glucose, UA 10/19/2018 >=1000 (1%)*    Ketones, UA 10/19/2018 15 (1+)*    Urobilinogen, UA 10/19/2018 0 2     Bilirubin, UA 10/19/2018 Small*    Blood, UA 10/19/2018 Negative     EXT PREG TEST UR (Ref: N* 10/19/2018 negative     RBC, UA 10/19/2018 None Seen     WBC, UA 10/19/2018 1-2*    Epithelial Cells 10/19/2018 Occasional     Bacteria, UA 10/19/2018 Occasional     WBC 10/19/2018 6 25     RBC 10/19/2018 5 19*    Hemoglobin 10/19/2018 17 4*    Hematocrit 10/19/2018 47 3*    MCV 10/19/2018 91     MCH 10/19/2018 33 5     MCHC 10/19/2018 36 8     RDW 10/19/2018 11 5*    MPV 10/19/2018 10 4     Platelets 56/84/2090 231     nRBC 10/19/2018 0     Neutrophils Relative 10/19/2018 62     Immat GRANS % 10/19/2018 1     Lymphocytes Relative 10/19/2018 24     Monocytes Relative 10/19/2018 11     Eosinophils Relative 10/19/2018 2     Basophils Relative 10/19/2018 0     Neutrophils Absolute 10/19/2018 3 86     Immature Grans Absolute 10/19/2018 0 04     Lymphocytes Absolute 10/19/2018 1 52     Monocytes Absolute 10/19/2018 0 69     Eosinophils Absolute 10/19/2018 0 12     Basophils Absolute 10/19/2018 0 02     Sodium 10/19/2018 131*    Potassium 10/19/2018 3 7     Chloride 10/19/2018 94*    CO2 10/19/2018 23     ANION GAP 10/19/2018 14*    BUN 10/19/2018 12     Creatinine 10/19/2018 0 95     Glucose 10/19/2018 347*    Calcium 10/19/2018 8 9     AST 10/19/2018 13     ALT 10/19/2018 35     Alkaline Phosphatase 10/19/2018 85     Total Protein 10/19/2018 8 3*    Albumin 10/19/2018 3 8     Total Bilirubin 10/19/2018 3 50*    eGFR 10/19/2018 74     Lipase 10/19/2018 85     Troponin I 10/19/2018 <0 02     pH, Adalberto 10/19/2018 7  321     pCO2, Adalberto 10/19/2018 43 9     pO2, Adalberto 10/19/2018 25 9*    HCO3, Adalberto 10/19/2018 22 2*    Base Excess, Adalberto 10/19/2018 -4 0     O2 Content, Adalberto 10/19/2018 13 0     O2 HGB, VENOUS 10/19/2018 49 5*    Acetone, Bld 10/19/2018 Negative     Sodium 10/20/2018 138     Potassium 10/20/2018 3 1*    Chloride 10/20/2018 103     CO2 10/20/2018 23     ANION GAP 10/20/2018 12     BUN 10/20/2018 7     Creatinine 10/20/2018 0 59*    Glucose 10/20/2018 234*    Glucose, Fasting 10/20/2018 234*    Calcium 10/20/2018 7 6*    eGFR 10/20/2018 113     Magnesium 10/20/2018 1 5*    Phosphorus 10/20/2018 2 2*    WBC 10/20/2018 4 69     RBC 10/20/2018 4 10  Hemoglobin 10/20/2018 13 9     Hematocrit 10/20/2018 37 9     MCV 10/20/2018 92     MCH 10/20/2018 33 9     MCHC 10/20/2018 36 7     RDW 10/20/2018 11 7     MPV 10/20/2018 10 5     Platelets 76/54/8634 181     nRBC 10/20/2018 0     Neutrophils Relative 10/20/2018 40*    Immat GRANS % 10/20/2018 1     Lymphocytes Relative 10/20/2018 43     Monocytes Relative 10/20/2018 12     Eosinophils Relative 10/20/2018 4     Basophils Relative 10/20/2018 0     Neutrophils Absolute 10/20/2018 1 89     Immature Grans Absolute 10/20/2018 0 03     Lymphocytes Absolute 10/20/2018 2 00     Monocytes Absolute 10/20/2018 0 58     Eosinophils Absolute 10/20/2018 0 17     Basophils Absolute 10/20/2018 0 02     Sed Rate 10/20/2018 11     CRP 10/20/2018 64 2*    POC Glucose 10/20/2018 211*       Imaging Studies: I have personally reviewed pertinent imaging studies

## 2018-10-20 NOTE — ASSESSMENT & PLAN NOTE
Identify with CT abdominopelvic under right hepatic lobe, with other abnormal findings to include enteritis and colitis and possible inflammatory bowel disease, will consult Gastroenterology  Consider ultrasound for further hepatic elucidation

## 2018-10-20 NOTE — ASSESSMENT & PLAN NOTE
HOLD METFORMIN, received IV contrast in ED  Start sliding scale  Resume home does lantus 12 u due to hyperglycemia

## 2018-10-20 NOTE — PLAN OF CARE
DISCHARGE PLANNING     Discharge to home or other facility with appropriate resources Progressing        GASTROINTESTINAL - ADULT     Minimal or absence of nausea and/or vomiting Progressing     Maintains or returns to baseline bowel function Progressing     Maintains adequate nutritional intake Progressing        INFECTION - ADULT     Absence or prevention of progression during hospitalization Progressing        Knowledge Deficit     Patient/family/caregiver demonstrates understanding of disease process, treatment plan, medications, and discharge instructions Progressing        METABOLIC, FLUID AND ELECTROLYTES - ADULT     Electrolytes maintained within normal limits Progressing     Fluid balance maintained Progressing     Glucose maintained within target range Progressing        PAIN - ADULT     Verbalizes/displays adequate comfort level or baseline comfort level Progressing        SAFETY ADULT     Patient will remain free of falls Progressing

## 2018-10-20 NOTE — H&P
H&P- Shelley Choudhary 1975, 44 y o  female MRN: [id number]    Unit/Bed#: ED 06 Encounter: [id number]    Primary Care Provider: Ann Marie Lambert PA-C   Date and time admitted to hospital: 10/19/2018  9:31 PM        Gastroenteritis, acute   Assessment & Plan    With colitis, probably viral versus inflammatory, patient will be made NPO, consult Gastroenterology, will sent stool for evaluation, and follow up with chemistry and CBC in a m  ESR/C-reactive protein     Hepatic lesion   Assessment & Plan    Identify with CT abdominopelvic under right hepatic lobe, with other abnormal findings to include enteritis and colitis and possible inflammatory bowel disease, will consult Gastroenterology  Consider ultrasound for further hepatic elucidation  Abdominal pain   Assessment & Plan    Diffuse and persistent x5 days probably secondary to gastroenteritis versus inflammatory bowel disease, follow-up with GI, will make NPO, status post 2 L of normal saline in the emergency, continue normal saline at 100 cc/hour  * Diarrhea   Assessment & Plan    Associated with vomiting most likely secondary to gastroenteritis, stool sent for evaluation, and monitor  Hyperglycemia   Assessment & Plan    Appear to be improving, given 2 L of normal saline in the emergency, will continue with normal saline at 100 cc/hour and follow up on insulin sliding scale  Sinus tachycardia   Assessment & Plan    Most likely secondary to dehydration from vomiting and persistent diarrhea, given 2 L of normal saline in the emergency with some improvement, will continue with normal saline at 100 cc/hour  And monitor very closely           History and Physical - [facility] Internal Medicine    Patient Information: Shelley Choudhary 44 y o  female MRN: [id number]  Unit/Bed#: ED 06 Encounter: [id number]  Admitting Physician: Magri Briseno MD  PCP: Ann Marie Lambert PA-C  Date of Admission:  10/20/18    Assessment/Plan:    Hospital Problem List: Principal Problem:    Diarrhea  Active Problems:    Abdominal pain    Hepatic lesion    Gastroenteritis, acute    Sinus tachycardia    Hyperglycemia    VTE Prophylaxis: Enoxaparin (Lovenox)  / sequential compression device   Code Status:  Full code  POLST: There is no POLST form on file for this patient (pre-hospital)    Anticipated Length of Stay:  Patient will be admitted on an Observation basis with an anticipated length of stay of  less than 2 midnights  Justification for Hospital Stay:  Acute gastroenteritis and colitis with persistent diarrhea rule out inflammatory bowel disease    Total Time for Visit, including Counseling / Coordination of Care: 45 minutes  Greater than 50% of this total time spent on direct patient counseling and coordination of care  Chief Complaint:   Abdominal pain weight vomiting and diarrhea    History of Present Illness:    Zulay Willoughby is a 44 y o  female obese with with past medical history of diabetes type 1, asthma and fibromyalgia  She presented to the emergency for evaluation of 5 days of persistent diarrhea and vomiting associated with diffuse abdominal pain and discomfort  She also experience intermittent chills but otherwise denies any fever or cough, she denies any recent travel, sick contact, change in diet or any antibiotic usage  She denies any eloy chest pain shortness of breath palpitation despite noted sinus tachycardia at presentation  She also had multiple episode of diarrhea in the emergency and evaluation with CT abdominopelvic noted several abnormal findings that include acute enteritis, colitis and hepatic lesion on the right lobe  Stool sample sent for evaluation and she has been admitted for further evaluation and management    Review of Systems:    Review of Systems   Constitutional: Positive for activity change, appetite change, chills and fatigue  Negative for diaphoresis, fever and unexpected weight change     HENT: Negative for congestion, dental problem, ear pain, facial swelling, hearing loss, mouth sores, postnasal drip, sinus pressure, sneezing, sore throat, tinnitus and trouble swallowing  Eyes: Negative for photophobia, pain, redness, itching and visual disturbance  Respiratory: Negative for cough, choking, chest tightness and shortness of breath  Cardiovascular: Negative for chest pain and palpitations  Gastrointestinal: Positive for abdominal pain, diarrhea, nausea and vomiting  Negative for abdominal distention, blood in stool and rectal pain  Bloating   Endocrine: Negative for heat intolerance, polydipsia, polyphagia and polyuria  Genitourinary: Negative for difficulty urinating, dysuria, flank pain, frequency and urgency  Musculoskeletal: Negative for myalgias, neck pain and neck stiffness  Skin: Negative for color change, pallor and wound  Neurological: Positive for weakness  Negative for dizziness, seizures, syncope, speech difficulty, light-headedness and headaches  Psychiatric/Behavioral: Negative for agitation, behavioral problems and confusion  Past Medical and Surgical History:     Past Medical History:   Diagnosis Date    Asthma     Diabetes mellitus ([facility] [address] )     Fibromyalgia        Past Surgical History:   Procedure Laterality Date     SECTION      CHOLECYSTECTOMY      TUBAL LIGATION         Meds/Allergies:    Prior to Admission medications    Medication Sig Start Date End Date Taking?  Authorizing Provider   albuterol (PROVENTIL HFA,VENTOLIN HFA) 90 mcg/act inhaler Inhale 18  Yes Historical Provider, MD   amitriptyline (ELAVIL) 50 mg tablet Take by mouth 18  Yes Historical Provider, MD   aspirin 81 MG tablet Take by mouth 18  Yes Historical Provider, MD   atorvastatin (LIPITOR) 80 mg tablet One pill by mouth once a day 18  Yes Historical Provider, MD   Blood Glucose Monitoring Suppl (ONE TOUCH ULTRA 2) w/Device KIT as directed 18  Yes Historical Provider, MD fluticasone-salmeterol (ADVAIR DISKUS) 500-50 mcg/dose inhaler Inhale 12/24/14  Yes Historical Provider, MD   glucose blood test strip TEST BLOOD SUGARS 3 TIMES DAILY 9/20/18  Yes Historical Provider, MD   insulin glargine (LANTUS SOLOSTAR) 100 units/mL injection pen Inject under the skin 6/14/18  Yes Historical Provider, MD   insulin lispro (HumaLOG) 100 units/mL injection pen Inject 12 Units under the skin 9/20/18  Yes Historical Provider, MD   Lancets (ONETOUCH ULTRASOFT) lancets uncontrolled DM, hyperglycemia, hypertension, test 4 times daily, 250 02 150/month 9/20/18  Yes Historical Provider, MD   losartan (COZAAR) 25 mg tablet One pill by mouth once a day 6/14/18  Yes Historical Provider, MD   metFORMIN (GLUCOPHAGE) 1000 MG tablet Take 1 tablet (1,000 mg total) by mouth 2 (two) times a day with meals 3/4/18  Yes Caroline Olivarez DO   montelukast (SINGULAIR) 10 mg tablet One pill by mouth once a day 6/14/18  Yes Historical Provider, MD     I have reviewed home medications with patient personally  Allergies: No Known Allergies    Social History:     Marital Status: /Civil Union   Occupation: N  Patient Pre-hospital Living Situation:  Active  Patient Pre-hospital Level of Mobility:  Mobile  Patient Pre-hospital Diet Restrictions:  Diabetic  Substance Use History:   History   Alcohol Use No     History   Smoking Status    Never Smoker   Smokeless Tobacco    Never Used     History   Drug Use No       Family History:    History reviewed  No pertinent family history  Physical Exam:     Vitals:   Blood Pressure: 126/80 (10/19/18 2310)  Pulse: 103 (10/19/18 2310)  Temperature: 98 3 °F (36 8 °C) (10/19/18 2056)  Temp Source: Oral (10/19/18 2056)  Respirations: 18 (10/19/18 2310)  Weight - Scale: 72 6 kg (160 lb) (10/19/18 2056)  SpO2: 97 % (10/19/18 2310)    Physical Exam   Constitutional: She is oriented to person, place, and time  She appears well-developed and well-nourished   She appears distressed  HENT:   Head: Normocephalic and atraumatic  Right Ear: External ear normal    Left Ear: External ear normal    Nose: Nose normal    Mouth/Throat: No oropharyngeal exudate  Dry mucous membrane   Eyes: Pupils are equal, round, and reactive to light  Conjunctivae and EOM are normal  Right eye exhibits no discharge  No scleral icterus  Neck: Normal range of motion  Neck supple  No thyromegaly present  Cardiovascular: Normal rate, regular rhythm and intact distal pulses  No murmur heard  Pulmonary/Chest: Breath sounds normal  No respiratory distress  She has no wheezes  She has no rales  Abdominal: Soft  There is tenderness  There is rebound  There is no guarding  Hyperactive bowel sounds and bloating   Musculoskeletal: Normal range of motion  She exhibits no edema, tenderness or deformity  Lymphadenopathy:     She has no cervical adenopathy  Neurological: She is alert and oriented to person, place, and time  She has normal reflexes  No cranial nerve deficit  Coordination normal    Skin: Skin is warm and dry  No rash noted  She is not diaphoretic  No erythema  No pallor  Psychiatric: She has a normal mood and affect  Additional Data:     Lab Results: I have personally reviewed pertinent reports  Results from last 7 days  Lab Units 10/19/18  2219   WBC Thousand/uL 6 25   HEMOGLOBIN g/dL 17 4*   HEMATOCRIT % 47 3*   PLATELETS Thousands/uL 231   NEUTROS PCT % 62   LYMPHS PCT % 24   MONOS PCT % 11   EOS PCT % 2       Results from last 7 days  Lab Units 10/19/18  2219   SODIUM mmol/L 131*   POTASSIUM mmol/L 3 7   CHLORIDE mmol/L 94*   CO2 mmol/L 23   BUN mg/dL 12   CREATININE mg/dL 0 95   CALCIUM mg/dL 8 9   ALK PHOS U/L 85   ALT U/L 35   AST U/L 13           Imaging: I have personally reviewed pertinent reports  Ct Abdomen Pelvis With Contrast    Result Date: 10/19/2018  Narrative: CT ABDOMEN AND PELVIS WITH IV CONTRAST INDICATION:   abdominal pain/diarrhea    Diffuse abdominal pain, diarrhea, headache, vomiting  History of diabetes, prior cholecystectomy COMPARISON:  None available  TECHNIQUE:  CT examination of the abdomen and pelvis was performed  Axial, sagittal, and coronal 2D reformatted images were created from the source data and submitted for interpretation  Radiation dose length product (DLP) for this visit:  619 17 mGy-cm   This examination, like all CT scans performed in the [facility], was performed utilizing techniques to minimize radiation dose exposure, including the use of iterative  reconstruction and automated exposure control  IV Contrast:  100 mL of iohexol (OMNIPAQUE) Enteric Contrast:  Enteric contrast was not administered  FINDINGS: ABDOMEN LOWER CHEST:  No clinically significant abnormality identified in the visualized lower chest  LIVER/BILIARY TREE:  There is mild fatty infiltration of the liver  There is an approximately 1 8 x 1 cm hypodense lesion in the right lobe of the liver on series 2 image 19 and there is an approximately 2 3 x 1 7 cm hypodense lesion in the right inferiorly in the right lobe of the liver on image 34  Both of these appear to demonstrate some nodular peripheral enhancement  These may represent hemangiomas  Nonemergent outpatient MRI is suggested for further characterization, if there are no contraindications  GALLBLADDER:  Gallbladder is surgically absent  SPLEEN:  Unremarkable  PANCREAS:  Unremarkable  ADRENAL GLANDS:  Unremarkable  KIDNEYS/URETERS:  Unremarkable  No hydronephrosis  STOMACH AND BOWEL:  There is fluid in the colon, consistent with the history of diarrhea  There is some bowel wall thickening and abnormal bowel wall enhancement involving the sigmoid and left colon to the level of the splenic flexure    The transverse colon is relatively spared, however, there is also some mild wall thickening and enhancement of the cecum and right colon to the level of the hepatic flexure, and also of the rectum  In addition, portions of the small bowel demonstrate small bowel wall thickening and hyperenhancement of the small bowel wall; this is most pronounced involving the ileum  Mesenteric vessels adjacent to these inflamed appearing small bowel loops appear somewhat prominent  This may reflect some form of enteritis with components of colitis  Crohn's disease should be excluded  Gastroenterology consultation and follow-up is suggested  APPENDIX:  A normal appendix was visualized  ABDOMINOPELVIC CAVITY:  No ascites or free intraperitoneal air  No lymphadenopathy  Mesentery as described above  VESSELS:  There is some noncalcified atherosclerotic change of the abdominal aorta  There is no evidence of abdominal aortic aneurysm  PELVIS REPRODUCTIVE ORGANS:  Unremarkable for patient's age  URINARY BLADDER:  Unremarkable  ABDOMINAL WALL/INGUINAL REGIONS:  There is a small fat-containing umbilical hernia  OSSEOUS STRUCTURES:  No acute fracture or destructive osseous lesion  Impression: Abnormal appearance of portions of the large and small bowel, as described above  Please see above discussion and differential diagnosis  Gastrology consultation and follow-up is suggested  There is mild fatty infiltration of the liver  There are 2 lesions in the right lobe of the liver, measuring 2 3 x 1 7 cm and 1 8 x 1 cm, respectively  These may represent hemangiomas  Nonemergent outpatient follow-up is suggested  Workstation performed: [id number]       EKG, Pathology, and Other Studies Reviewed on Admission:   · EKG:     Allscripts / Epic Records Reviewed: Yes     ** Please Note: This note has been constructed using a voice recognition system   **

## 2018-10-20 NOTE — ED PROVIDER NOTES
History  Chief Complaint   Patient presents with    Abdominal Pain     patient is c/o abdominal pain, back pain, headache and diarrhea x 5 days  52-year-old female presenting for evaluation of abdominal pain and diarrhea  Patient reports the symptoms have been present over the past 5 days  She reports diffuse abdominal discomfort  She reports numerous episodes of loose, nonbloody stools, exactly 66 episodes over the past 5 days  Also reports some nausea with a few episodes of nonbloody nonbilious vomiting  No similar symptoms in the past   No known sick contacts  No recent antibiotic use  No recent travel  Denies any fevers, chills, URI symptoms, CP, SOB, urinary complaints  History of IDDM with Accu-Cheks at home in the 500s  Abdominal surgeries:  , cholecystectomy, tubal ligation  A/P:  52-year-old female with abdominal pain/diarrhea- will get DKA workup, CBC to assess for leukocytosis/anemia, CMP to assess liver/renal function/electrolytes, lipase to r/o pancreatitis, CT A/P to assess for intra-abdominal pathology, treat symptomatically, IV fluids, dispo accordingly            Prior to Admission Medications   Prescriptions Last Dose Informant Patient Reported? Taking?    Blood Glucose Monitoring Suppl (ONE TOUCH ULTRA 2) w/Device KIT   Yes Yes   Sig: as directed   Lancets (ONETOUCH ULTRASOFT) lancets   Yes Yes   Sig: uncontrolled DM, hyperglycemia, hypertension, test 4 times daily, 250 02 150/month   albuterol (PROVENTIL HFA,VENTOLIN HFA) 90 mcg/act inhaler   Yes Yes   Sig: Inhale   amitriptyline (ELAVIL) 50 mg tablet   Yes Yes   Sig: Take by mouth   aspirin 81 MG tablet   Yes Yes   Sig: Take by mouth   atorvastatin (LIPITOR) 80 mg tablet   Yes Yes   Sig: One pill by mouth once a day   fluticasone-salmeterol (ADVAIR DISKUS) 500-50 mcg/dose inhaler   Yes Yes   Sig: Inhale   glucose blood test strip   Yes Yes   Sig: TEST BLOOD SUGARS 3 TIMES DAILY   insulin glargine (LANTUS SOLOSTAR) 100 units/mL injection pen   Yes Yes   Sig: Inject under the skin   insulin lispro (HumaLOG) 100 units/mL injection pen   Yes Yes   Sig: Inject 12 Units under the skin   losartan (COZAAR) 25 mg tablet   Yes Yes   Sig: One pill by mouth once a day   metFORMIN (GLUCOPHAGE) 1000 MG tablet   No Yes   Sig: Take 1 tablet (1,000 mg total) by mouth 2 (two) times a day with meals   montelukast (SINGULAIR) 10 mg tablet   Yes Yes   Sig: One pill by mouth once a day      Facility-Administered Medications: None       Past Medical History:   Diagnosis Date    Asthma     Diabetes mellitus ([facility] [address] )     Fibromyalgia        Past Surgical History:   Procedure Laterality Date     SECTION      CHOLECYSTECTOMY      TUBAL LIGATION         History reviewed  No pertinent family history  I have reviewed and agree with the history as documented  Social History   Substance Use Topics    Smoking status: Never Smoker    Smokeless tobacco: Never Used    Alcohol use No        Review of Systems   Constitutional: Negative for chills and fever  HENT: Negative for rhinorrhea and sore throat  Respiratory: Negative for cough and shortness of breath  Cardiovascular: Negative for chest pain and leg swelling  Gastrointestinal: Positive for abdominal pain and diarrhea  Negative for constipation, nausea and vomiting  Endocrine: Negative for polydipsia, polyphagia and polyuria  Genitourinary: Negative for dysuria, frequency, hematuria and urgency  Musculoskeletal: Negative for back pain, myalgias and neck pain  Skin: Negative for color change and rash  Allergic/Immunologic: Negative for environmental allergies and immunocompromised state  Neurological: Negative for dizziness, weakness, light-headedness, numbness and headaches  Hematological: Negative for adenopathy  Does not bruise/bleed easily  Psychiatric/Behavioral: Negative for agitation and confusion  All other systems reviewed and are negative        Physical Exam  Physical Exam   Constitutional: She is oriented to person, place, and time  She appears well-developed and well-nourished  HENT:   Head: Normocephalic and atraumatic  Nose: Nose normal    Mouth/Throat: Oropharynx is clear and moist    Eyes: Conjunctivae and EOM are normal    Neck: Normal range of motion  Neck supple  Cardiovascular: Regular rhythm, normal heart sounds and intact distal pulses  tachycardic   Pulmonary/Chest: Effort normal and breath sounds normal  She exhibits no tenderness  Abdominal: Soft  She exhibits no distension  There is tenderness  There is no rebound and no guarding  Diffuse abdominal ttp  No rebound/guarding  No back/CVA ttp   Musculoskeletal: She exhibits no edema or deformity  Neurological: She is alert and oriented to person, place, and time  She exhibits normal muscle tone  Coordination normal    Skin: Skin is warm and dry  Psychiatric: She has a normal mood and affect  Thought content normal    Nursing note and vitals reviewed        Vital Signs  ED Triage Vitals [10/19/18 2056]   Temperature Pulse Respirations Blood Pressure SpO2   98 3 °F (36 8 °C) (!) 114 18 134/78 99 %      Temp Source Heart Rate Source Patient Position - Orthostatic VS BP Location FiO2 (%)   Oral Monitor Sitting Right arm --      Pain Score       Worst Possible Pain           Vitals:    10/19/18 2056 10/19/18 2310   BP: 134/78 126/80   Pulse: (!) 114 103   Patient Position - Orthostatic VS: Sitting Lying       Visual Acuity      ED Medications  Medications   sodium chloride 0 9 % bolus 1,000 mL (not administered)   dicyclomine (BENTYL) tablet 20 mg (not administered)   sodium chloride 0 9 % bolus 1,000 mL (0 mL Intravenous Stopped 10/19/18 2335)   ondansetron (ZOFRAN) injection 4 mg (4 mg Intravenous Given 10/19/18 2223)   HYDROmorphone (DILAUDID) injection 0 5 mg (0 5 mg Intravenous Given 10/19/18 2223)   iohexol (OMNIPAQUE) 350 MG/ML injection (MULTI-DOSE) 100 mL (100 mL Intravenous Given 10/19/18 6643)       Diagnostic Studies  Results Reviewed     Procedure Component Value Units Date/Time    Stool Enteric Bacterial Panel by PCR [[id number]]     Lab Status:  No result Specimen:  Stool from Per Rectum     Clostridium difficile toxin by PCR [[id number]]     Lab Status:  No result Specimen:  Stool from Per Rectum     Troponin I [[id number]]  (Normal) Collected:  10/19/18 2219    Lab Status:  Final result Specimen:  Blood from Arm, Right Updated:  10/19/18 2301     Troponin I <0 02 ng/mL     Comprehensive metabolic panel [[id number]]  (Abnormal) Collected:  10/19/18 2219    Lab Status:  Final result Specimen:  Blood from Arm, Right Updated:  10/19/18 2257     Sodium 131 (L) mmol/L      Potassium 3 7 mmol/L      Chloride 94 (L) mmol/L      CO2 23 mmol/L      ANION GAP 14 (H) mmol/L      BUN 12 mg/dL      Creatinine 0 95 mg/dL      Glucose 347 (H) mg/dL      Calcium 8 9 mg/dL      AST 13 U/L      ALT 35 U/L      Alkaline Phosphatase 85 U/L      Total Protein 8 3 (H) g/dL      Albumin 3 8 g/dL      Total Bilirubin 3 50 (H) mg/dL      eGFR 74 ml/min/1 73sq m     Narrative:         National Kidney Disease Education Program recommendations are as follows:  GFR calculation is accurate only with a steady state creatinine  Chronic Kidney disease less than 60 ml/min/1 73 sq  meters  Kidney failure less than 15 ml/min/1 73 sq  meters      Lipase [[id number]]  (Normal) Collected:  10/19/18 2219    Lab Status:  Final result Specimen:  Blood from Arm, Right Updated:  10/19/18 2257     Lipase 85 u/L     Acetone [[id number]]  (Normal) Collected:  10/19/18 2219    Lab Status:  Final result Specimen:  Blood from Arm, Right Updated:  10/19/18 2239     Acetone, Bld Negative    Blood gas, venous [[id number]]  (Abnormal) Collected:  10/19/18 2219    Lab Status:  Final result Specimen:  Blood from Arm, Right Updated:  10/19/18 2227     pH, Adalberto 7 321     pCO2, Adalberto 43 9 mm Hg      pO2, Adalberto 25 9 (L) mm Hg      HCO3, Adalberto 22 2 (L) mmol/L Base Excess, Adalberto -4 0 mmol/L      O2 Content, Adalberto 13 0 ml/dL      O2 HGB, VENOUS 49 5 (L) %     CBC and differential [[id number]]  (Abnormal) Collected:  10/19/18 2219    Lab Status:  Final result Specimen:  Blood from Arm, Right Updated:  10/19/18 2226     WBC 6 25 Thousand/uL      RBC 5 19 (H) Million/uL      Hemoglobin 17 4 (H) g/dL      Hematocrit 47 3 (H) %      MCV 91 fL      MCH 33 5 pg      MCHC 36 8 g/dL      RDW 11 5 (L) %      MPV 10 4 fL      Platelets 375 Thousands/uL      nRBC 0 /100 WBCs      Neutrophils Relative 62 %      Immat GRANS % 1 %      Lymphocytes Relative 24 %      Monocytes Relative 11 %      Eosinophils Relative 2 %      Basophils Relative 0 %      Neutrophils Absolute 3 86 Thousands/µL      Immature Grans Absolute 0 04 Thousand/uL      Lymphocytes Absolute 1 52 Thousands/µL      Monocytes Absolute 0 69 Thousand/µL      Eosinophils Absolute 0 12 Thousand/µL      Basophils Absolute 0 02 Thousands/µL     Osmolality [[id number]] Collected:  10/19/18 2219    Lab Status: In process Specimen:  Blood from Arm, Right Updated:  10/19/18 2222    POCT pregnancy, urine [[id number]]  (Normal) Resulted:  10/19/18 2149    Lab Status:  Final result Specimen:  Urine Updated:  10/19/18 2149     EXT PREG TEST UR (Ref: Negative) negative    Urine Microscopic [[id number]]  (Abnormal) Collected:  10/19/18 2114    Lab Status:  Final result Specimen:  Urine from Urine, Clean Catch Updated:  10/19/18 2135     RBC, UA None Seen /hpf      WBC, UA 1-2 (A) /hpf      Epithelial Cells Occasional /hpf      Bacteria, UA Occasional /hpf     UA w Reflex to Microscopic w Reflex to Culture [[id number]]  (Abnormal) Collected:  10/19/18 2114    Lab Status:  Final result Specimen:  Urine from Urine, Clean Catch Updated:  10/19/18 2123     Color, UA Yellow     Clarity, UA Clear     Specific Gravity, UA 1 010     pH, UA 6 0     Leukocytes, UA Elevated glucose may cause decreased leukocyte values   See urine microscopic for [facility] result/ (A) Nitrite, UA Negative     Protein, UA Negative mg/dl      Glucose, UA >=1000 (1%) (A) mg/dl      Ketones, UA 15 (1+) (A) mg/dl      Urobilinogen, UA 0 2 E U /dl      Bilirubin, UA Small (A)     Blood, UA Negative                 CT abdomen pelvis with contrast   ED Interpretation by Henderson Opitz, DO (10/19 6232)   CT ABDOMEN AND PELVIS WITH IV CONTRAST       INDICATION:   abdominal pain/diarrhea  Diffuse abdominal pain, diarrhea, headache, vomiting  History of diabetes, prior cholecystectomy       COMPARISON:  None available        TECHNIQUE:  CT examination of the abdomen and pelvis was performed  Axial, sagittal, and coronal 2D reformatted images were created from the source data and submitted for interpretation        Radiation dose length product (DLP) for this visit:  619 17 mGy-cm   This examination, like all CT scans performed in the [facility], was performed utilizing techniques to minimize radiation dose exposure, including the use of iterative    reconstruction and automated exposure control        IV Contrast:  100 mL of iohexol (OMNIPAQUE)   Enteric Contrast:  Enteric contrast was not administered        FINDINGS:       ABDOMEN       LOWER CHEST:  No clinically significant abnormality identified in the visualized lower chest        LIVER/BILIARY TREE:  There is mild fatty infiltration of the liver  There is an approximately 1 8 x 1 cm hypodense lesion in the right lobe of the liver on series 2 image 19 and there is an approximately 2 3 x 1 7 cm hypodense lesion in the right    inferiorly in the right lobe of the liver on image 34  Both of these appear to demonstrate some nodular peripheral enhancement  These may represent hemangiomas    Nonemergent outpatient MRI is suggested for further characterization, if there are no    contraindications        GALLBLADDER:  Gallbladder is surgically absent        SPLEEN:  Unremarkable        PANCREAS:  Unremarkable        ADRENAL GLANDS: Unremarkable        KIDNEYS/URETERS:  Unremarkable  No hydronephrosis        STOMACH AND BOWEL:  There is fluid in the colon, consistent with the history of diarrhea  There is some bowel wall thickening and abnormal bowel wall enhancement involving the sigmoid and left colon to the level of the splenic flexure  The transverse    colon is relatively spared, however, there is also some mild wall thickening and enhancement of the cecum and right colon to the level of the hepatic flexure, and also of the rectum  In addition, portions of the small bowel demonstrate small bowel wall    thickening and hyperenhancement of the small bowel wall; this is most pronounced involving the ileum  Mesenteric vessels adjacent to these inflamed appearing small bowel loops appear somewhat prominent  This may reflect some form of enteritis with    components of colitis  Crohn's disease should be excluded  Gastroenterology consultation and follow-up is suggested        APPENDIX:  A normal appendix was visualized        ABDOMINOPELVIC CAVITY:  No ascites or free intraperitoneal air  No lymphadenopathy  Mesentery as described above        VESSELS:  There is some noncalcified atherosclerotic change of the abdominal aorta  There is no evidence of abdominal aortic aneurysm        PELVIS       REPRODUCTIVE ORGANS:  Unremarkable for patient's age        URINARY BLADDER:  Unremarkable        ABDOMINAL WALL/INGUINAL REGIONS:  There is a small fat-containing umbilical hernia        OSSEOUS STRUCTURES:  No acute fracture or destructive osseous lesion        IMPRESSION:       Abnormal appearance of portions of the large and small bowel, as described above  Please see above discussion and differential diagnosis  Gastrology consultation and follow-up is suggested        There is mild fatty infiltration of the liver  There are 2 lesions in the right lobe of the liver, measuring 2 3 x 1 7 cm and 1 8 x 1 cm, respectively    These may represent hemangiomas  Nonemergent outpatient follow-up is suggested  Final Result by Cora Siddiqi MD (10/19 3082)      Abnormal appearance of portions of the large and small bowel, as described above  Please see above discussion and differential diagnosis  Gastrology consultation and follow-up is suggested  There is mild fatty infiltration of the liver  There are 2 lesions in the right lobe of the liver, measuring 2 3 x 1 7 cm and 1 8 x 1 cm, respectively  These may represent hemangiomas  Nonemergent outpatient follow-up is suggested              Workstation performed: [id number]                    Procedures  Procedures       Phone Contacts  ED Phone Contact    ED Course  ED Course as of Oct 20 0005   Fri Oct 19, 2018   2152 Pulse: (!) 114   2152 Ketones, UA: (!) 15 (1+)   2307 Glucose: (!) 347   2307 TOTAL BILIRUBIN: (!) 3 50   2308 Corrected Na is 135 Sodium: (!) 131   2339 Reports abdominal pain improved, just had episode of loose stools in bed    Sat Oct 20, 2018   0001 Paged SLIM                                MDM  Number of Diagnoses or Management Options  Abdominal pain:   Bowel wall thickening:   Diarrhea:   Enteritis:   Hyperglycemia:   Diagnosis management comments: 43 yo F with abdominal pain/severe diarrhea, infectious vs inflammatory, admitted for further workup/management       Amount and/or Complexity of Data Reviewed  Clinical lab tests: ordered and reviewed  Tests in the radiology section of CPT®: ordered and reviewed  Tests in the medicine section of CPT®: ordered and reviewed      CritCare Time    Disposition  Final diagnoses:   Enteritis   Bowel wall thickening   Diarrhea   Abdominal pain   Hyperglycemia     Time reflects when diagnosis was documented in both MDM as applicable and the Disposition within this note     Time User Action Codes Description Comment    10/20/2018 12:04 AM Noelle HIRSCH Add [K52 9] Enteritis     10/20/2018 12:04 AM Noelle HIRSCH Add [K63 9] Bowel wall thickening 10/20/2018 12:04 AM Claudia Muta A Add [R19 7] Diarrhea     10/20/2018 12:04 AM Claudia Muta A Add [R10 9] Abdominal pain     10/20/2018 12:04 AM Claudia Muta A Add [R73 9] Hyperglycemia       ED Disposition     ED Disposition Condition Comment    Admit  Case was discussed with AMBROSIO and the patient's admission status was agreed to be Admission Status: observation status to the service of Dr Jaramillo Islam   Follow-up Information    None         Patient's Medications   Discharge Prescriptions    No medications on file     No discharge procedures on file      ED Provider  Electronically Signed by           Montez Tian DO  10/20/18 0005

## 2018-10-20 NOTE — ASSESSMENT & PLAN NOTE
Most likely secondary to dehydration from vomiting and persistent diarrhea, given 2 L of normal saline in the emergency with some improvement, will continue with normal saline at 100 cc/hour  And monitor very closely

## 2018-10-20 NOTE — UTILIZATION REVIEW
[address] [place] [address] in the [facility] by Gal Lawrence for 2017  Network Utilization Review Department  Phone: [phone]; Fax [phone]  ATTENTION: The Network Utilization Review Department is now centralized for our 7 Facilities  Please call with any questions or concerns to [phone] and carefully follow the prompts so that you are directed to the right person  All voicemails are confidential  Fax any determinations, approvals, denials, and requests for initial or continue stay review clinical to [phone]  Due to HIGH CALL volume, it would be easier if you could please send faxed requests to expedite your requests and in part, help us provide discharge notifications faster   /////////////////////////////////////////////////////////////////////////////////////////////////////////////////    Initial Clinical Review    ADMIT   OBS  On  10/20  @  0005    Orders Placed This Encounter   Procedures    Place in Observation (expected length of stay for this patient is less than two midnights)     Standing Status:   Standing     Number of Occurrences:   1     Order Specific Question:   Admitting Physician     Answer:   Virginia Baird [[id number]]     Order Specific Question:   Level of Care     Answer:   Med Surg [16]         ED: Date/Time/Mode of Arrival:   ED Arrival Information     Expected Arrival Acuity Means of Arrival Escorted By Service Admission Type    - 10/19/2018 20:29 Urgent Walk-In Family Member General Medicine Urgent    Arrival Complaint    ABD PAIN          Chief Complaint:   Chief Complaint   Patient presents with    Abdominal Pain     patient is c/o abdominal pain, back pain, headache and diarrhea x 5 days  History of Illness:   44 y o  female obese with with past medical history of diabetes type 1, asthma and fibromyalgia    She presented to the emergency for evaluation of 5 days of persistent diarrhea and vomiting associated with diffuse abdominal pain and discomfort  - sinus tachycardia at presentation   also had multiple episode of diarrhea in ER  -  CT abdominopelvic noted several abnormal findings that include acute enteritis, colitis and hepatic lesion on the right lobe  Stool sample sent for evaluation and she has been admitted for further evaluation and management    ED Vital Signs:     10/20/18 0754  98 1 °F (36 7 °C)  91  18  107/66  --  None (Room air)  Lying   10/19/18 2310  --  103  18  126/80  97 %  None (Room air)  Lying   10/19/18 2056  98 3 °F (36 8 °C)   114  18  134/78  99 %  None (Room air)       72 6 kg (160 lb)    Abnormal Labs/Diagnostic Test Results:   vBG   7 321/  43 9/ 25 9/ 22 2  Na  131   138  K  3 7   3 1  crt  0 95   0 59  Glucose  347   234  Tot bili  3 50  Phos  2 2  Mg  1 5  Hgb  17 4  13 9  hct  47 3   37 9  UA  (+)glucose and ketone      sg 1 010   Nitrite neg     Elevated leuks , occ bacteria     ED Treatment:   Medication Administration from 10/19/2018 2029 to 10/20/2018 1337       Date/Time Order Dose Route Action Action by Comments                10/19/2018 2223 sodium chloride 0 9 % bolus 1,000 mL 1,000 mL Intravenous New Bag Rayna Manjarrez RN      10/19/2018 2223 ondansetron (ZOFRAN) injection 4 mg 4 mg Intravenous Given Sarahitamiko Manjarrez RN      10/19/2018 2223 HYDROmorphone (DILAUDID) injection 0 5 mg 0 5 mg Intravenous Given Rayna Manjarrez RN                            10/20/2018 0054 sodium chloride 0 9 % bolus 1,000 mL 1,000 mL Intravenous [place] Sarahitamiko Manjarrez RN      10/20/2018 0054 dicyclomine (BENTYL) tablet 20 mg 20 mg Oral Given Rayna Manjarrez RN      10/20/2018 1005 aspirin chewable tablet 81 mg 81 mg Oral Given Dillan Boggs RN      10/20/2018 9508 fluticasone-vilanterol (BREO ELLIPTA) 100-25 mcg/inh inhaler 1 puff 1 puff Inhalation Not Given Dillan Boggs RN      10/20/2018 1057 losartan (COZAAR) tablet 25 mg 25 mg Oral Given Dillan Boggs [facility]      10/20/2018 8513 metFORMIN (GLUCOPHAGE) tablet 1,000 mg 1,000 mg Oral Given Carlos Arambula RN      10/20/2018 1057 montelukast (SINGULAIR) tablet 10 mg 10 mg Oral Given Carlos Arambula RN      10/20/2018 1011 enoxaparin (LOVENOX) subcutaneous injection 40 mg 40 mg Subcutaneous Given Carlos Arambula RN      10/20/2018 0436 sodium chloride 0 9 % infusion 100 mL/hr Intravenous New Bag Constance Petersen RN      10/20/2018 1150 dicyclomine (BENTYL) tablet 20 mg 20 mg Oral Given Carlos Arambula RN      10/20/2018 1228 ciprofloxacin (CIPRO) tablet 500 mg 500 mg Oral Given Sukhjinder Valentino RN      10/20/2018 1327 HYDROmorphone (DILAUDID) injection 0 5 mg 0 5 mg Intravenous Given Carlos Arambula RN           Past Medical/Surgical History:   Asthma, DM, fibromyalgia     Admitting Diagnosis: Abdominal pain [R10 9]    Assessment/Plan:       Gastroenteritis, acute   Assessment & Plan     With colitis, probably viral versus inflammatory, patient will be made NPO, consult Gastroenterology, will sent stool for evaluation, and follow up with chemistry and CBC in a m  ESR/C-reactive protein      Hepatic lesion   Assessment & Plan     Identify with CT abdominopelvic under right hepatic lobe, with other abnormal findings to include enteritis and colitis and possible inflammatory bowel disease, will consult Gastroenterology    Consider ultrasound for further hepatic elucidation       Abdominal pain   Assessment & Plan     Diffuse and persistent x5 days probably secondary to gastroenteritis versus inflammatory bowel disease, follow-up with GI, will make NPO, status post 2 L of normal saline in the emergency, continue normal saline at 100 cc/hour       * Diarrhea   Assessment & Plan     Associated with vomiting most likely secondary to gastroenteritis, stool sent for evaluation, and monitor       Hyperglycemia   Assessment & Plan     Appear to be improving, given 2 L of normal saline in the emergency, will continue with normal saline at 100 cc/hour and follow up on insulin sliding scale       Sinus tachycardia   Assessment & Plan     Most likely secondary to dehydration from vomiting and persistent diarrhea, given 2 L of normal saline in the emergency with some improvement, will continue with normal saline at 100 cc/hour  And monitor very closely           10/20    Admission Orders:  Admit med surg  Npo  Gi consult  IVF @ 100   accucks qid w/sliding scale insulin  Stool studies       10/20  GI CONSULT   1  Colitis- likely viral versus bacteria versus parasitic   2  Abdominal pain due to colitis     Likely cause of her colitis is related to the bacterial versus viral infection  Due to acute nature of symptoms this is likely secondary to infectious etiology  We will start antibiotics on her despite not having leukocytosis since she is not improving and her symptoms are somewhat getting worse  Currently can start on clear liquids would avoid dairy and fiber  She could also get started on probiotics  I would check stool studies for community-acquired C diff, bacteria, parasite, Giardia  She can be considered for an outpatient colonoscopy      10/20    Scheduled Meds:   Current Facility-Administered Medications:  aspirin 81 mg Oral Daily Martha Hernandez MD    atorvastatin 80 mg Oral Daily With Johanny Velazquez MD    ciprofloxacin 500 mg Oral Q12H Alexander Baliey MD    dicyclomine 20 mg Oral 4x Daily (AC & HS) Laurie Ferraro PA-C    enoxaparin 40 mg Subcutaneous Daily Martha Hernandez MD    fluticasone-vilanterol 1 puff Inhalation Daily Martha Hernandez MD    losartan 25 mg Oral Daily Martha Hernandez MD    metFORMIN 1,000 mg Oral BID With Meals Martha Hernandez MD    metroNIDAZOLE 500 mg Oral Q8H Alexander Bailey MD    montelukast 10 mg Oral Daily Martha Hernandez MD    sodium chloride 100 mL/hr Intravenous Continuous Martha Hernandez MD Last Rate: 100 mL/hr (10/20/18 5996)

## 2018-10-20 NOTE — ASSESSMENT & PLAN NOTE
Most likely secondary to dehydration from vomiting and persistent diarrhea, given 2 L of normal saline in the emergency with improvement, will continue with normal saline at 100 cc/hour  And monitor very closely

## 2018-10-20 NOTE — ASSESSMENT & PLAN NOTE
Appear to be improving, given 2 L of normal saline in the emergency, will continue with normal saline at 100 cc/hour and follow up on insulin sliding scale

## 2018-10-20 NOTE — PROGRESS NOTES
Post admission note    Progress Note - Gary Gary 1975, 44 y o  female MRN: [id number]    Unit/Bed#: -01 Encounter: [id number]    Primary Care Provider: Beulah Haddad PA-C   Date and time admitted to hospital: 10/19/2018  9:31 PM    * Diarrhea   Assessment & Plan    Associated with vomiting - GI consult reviewed, continue cipro/flagyl, probiotics  Advanced to clears  Follow up on stool studies, rule out C diff  Monitor  Continue IV fluids  Abdominal pain   Assessment & Plan    Diffuse and persistent, continue bentyl and pain control as needed  Gastroenteritis, acute   Assessment & Plan    With colitis, treatment as above  Hepatic lesion   Assessment & Plan    Seen on CT a/p - will discuss with GI whether U/S warranted  Type 2 diabetes mellitus with hyperglycemia, with long-term current use of insulin ([facility] [address] )   Assessment & Plan    HOLD METFORMIN, received IV contrast in ED  Start sliding scale  Resume home does lantus 12 u due to hyperglycemia     Sinus tachycardia   Assessment & Plan    Most likely secondary to dehydration from vomiting and persistent diarrhea, given 2 L of normal saline in the emergency with improvement, will continue with normal saline at 100 cc/hour  And monitor very closely  VTE Pharmacologic Prophylaxis:   Pharmacologic: Enoxaparin (Lovenox)  Mechanical VTE Prophylaxis in Place: Yes    Patient Centered Rounds: I have performed bedside rounds with nursing staff today  Discussions with Specialists or Other Care Team Provider: GI note reviewed     Education and Discussions with Family / Patient: d/w patient and parents at bedside     Time Spent for Care: 30 minutes  More than 50% of total time spent on counseling and coordination of care as described above      Current Length of Stay: 0 day(s)    Current Patient Status: Observation   Certification Statement: observation status, monitor overnight with IV fluids and IV antibiotics    Discharge Plan: not medically cleared     Code Status: Level 1 - Full Code      Subjective:   Patient seen as a post admit follow-up  She was admitted early this morning for colitis  GI has since seen her, started on Cipro and Flagyl  C diff is ruled out with a (-) the result  No fevers or chills, complains of abdominal pain which is sharp and crampy and intermittent  It is relieved with medication  No nausea or vomiting  Urinating normally  Objective:     Vitals:   Temp (24hrs), Av °F (36 7 °C), Min:97 4 °F (36 3 °C), Max:98 3 °F (36 8 °C)    Temp:  [97 4 °F (36 3 °C)-98 3 °F (36 8 °C)] 97 4 °F (36 3 °C)  HR:  [] 87  Resp:  [18] 18  BP: ()/(58-80) 93/58  SpO2:  [93 %-99 %] 93 %  Body mass index is 27 85 kg/m²  Input and Output Summary (last 24 hours): Intake/Output Summary (Last 24 hours) at 10/20/18 1641  Last data filed at 10/20/18 0346   Gross per 24 hour   Intake             2000 ml   Output                0 ml   Net             2000 ml       Physical Exam:     Physical Exam   Constitutional: She is oriented to person, place, and time  She appears well-developed and well-nourished  No distress  HENT:   Mouth/Throat: Oropharynx is clear and moist    Cardiovascular: Normal rate, regular rhythm, S1 normal, S2 normal and normal heart sounds  No murmur heard  Pulmonary/Chest: Effort normal and breath sounds normal  No respiratory distress  She has no wheezes  She has no rales  Abdominal: Soft  Bowel sounds are normal  She exhibits no distension  There is tenderness  There is guarding  There is no rebound  Neurological: She is alert and oriented to person, place, and time  Nursing note and vitals reviewed        Additional Data:     Labs:      Results from last 7 days  Lab Units 10/20/18  0421   WBC Thousand/uL 4 69   HEMOGLOBIN g/dL 13 9   HEMATOCRIT % 37 9   PLATELETS Thousands/uL 181   NEUTROS PCT % 40*   LYMPHS PCT % 43   MONOS PCT % 12   EOS PCT % 4       Results from last 7 days  Lab Units 10/20/18  0421 10/19/18  2219   SODIUM mmol/L 138 131*   POTASSIUM mmol/L 3 1* 3 7   CHLORIDE mmol/L 103 94*   CO2 mmol/L 23 23   BUN mg/dL 7 12   CREATININE mg/dL 0 59* 0 95   ANION GAP mmol/L 12 14*   CALCIUM mg/dL 7 6* 8 9   ALBUMIN g/dL  --  3 8   TOTAL BILIRUBIN mg/dL  --  3 50*   ALK PHOS U/L  --  85   ALT U/L  --  35   AST U/L  --  13           Results from last 7 days  Lab Units 10/20/18  1150 10/20/18  0750   POC GLUCOSE mg/dl 205* 211*           Results from last 7 days  Lab Units 10/20/18  1549   LACTIC ACID mmol/L 0 9           * I Have Reviewed All Lab Data Listed Above  * Additional Pertinent Lab Tests Reviewed: Jennifer 67 Admission Reviewed    Imaging:    Imaging Reports Reviewed Today Include:  CT  Imaging Personally Reviewed by Myself Includes:  None    Recent Cultures (last 7 days):       Results from last 7 days  Lab Units 10/20/18  0029   C DIFF TOXIN B  NEGATIVE for C difficle toxin by PCR          Last 24 Hours Medication List:     Current Facility-Administered Medications:  acetaminophen 650 mg Oral Q6H PRN Korey Leyva PA-C    aspirin 81 mg Oral Daily Melany Chávez MD    atorvastatin 80 mg Oral Daily With Melissa Carmona MD    ciprofloxacin 500 mg Oral Q12H Malcom Pollard MD    dicyclomine 20 mg Oral 4x Daily (AC & HS) Nina Ferraro PA-C    enoxaparin 40 mg Subcutaneous Daily Melany Chávez MD    fluticasone-vilanterol 1 puff Inhalation Daily Melany Chávez MD    HYDROmorphone 0 5 mg Intravenous Q3H PRN Korey Leyva PA-C    insulin glargine 10 Units Subcutaneous HS VARUN Carter-NESSA    insulin lispro 1-5 Units Subcutaneous TID AC Laurie Ferraro PA-C    insulin lispro 1-5 Units Subcutaneous HS Laurie Ferraro PA-C    losartan 25 mg Oral Daily Melany Chávez MD    magnesium sulfate 2 g Intravenous Once Laurie Ferraro PA-C    metroNIDAZOLE 500 mg Oral Q8H Malcom Pollard MD    montelukast 10 mg Oral Daily Melany Chávez MD sodium chloride 100 mL/hr Intravenous Continuous Donny Phillips MD Last Rate: 100 mL/hr (10/20/18 3026)        Today, Patient Was Seen By: Maura Bailey PA-C    ** Please Note: Dictation voice to text software may have been used in the creation of this document   **

## 2018-10-20 NOTE — ASSESSMENT & PLAN NOTE
Associated with vomiting - GI consult reviewed, continue cipro/flagyl, probiotics  Advanced to clears  Follow up on stool studies, rule out C diff  Monitor  Continue IV fluids

## 2018-10-20 NOTE — ASSESSMENT & PLAN NOTE
Associated with vomiting most likely secondary to gastroenteritis, stool sent for evaluation, and monitor

## 2018-10-21 PROBLEM — R00.0 SINUS TACHYCARDIA: Status: RESOLVED | Noted: 2018-10-20 | Resolved: 2018-10-21

## 2018-10-21 LAB
ANION GAP SERPL CALCULATED.3IONS-SCNC: 10 MMOL/L (ref 4–13)
BASOPHILS # BLD AUTO: 0.02 THOUSANDS/ΜL (ref 0–0.1)
BASOPHILS NFR BLD AUTO: 1 % (ref 0–1)
BUN SERPL-MCNC: 3 MG/DL (ref 5–25)
CALCIUM SERPL-MCNC: 7.7 MG/DL (ref 8.3–10.1)
CHLORIDE SERPL-SCNC: 107 MMOL/L (ref 100–108)
CO2 SERPL-SCNC: 24 MMOL/L (ref 21–32)
CREAT SERPL-MCNC: 0.5 MG/DL (ref 0.6–1.3)
EOSINOPHIL # BLD AUTO: 0.13 THOUSAND/ΜL (ref 0–0.61)
EOSINOPHIL NFR BLD AUTO: 3 % (ref 0–6)
ERYTHROCYTE [DISTWIDTH] IN BLOOD BY AUTOMATED COUNT: 11.6 % (ref 11.6–15.1)
GFR SERPL CREATININE-BSD FRML MDRD: 120 ML/MIN/1.73SQ M
GLUCOSE P FAST SERPL-MCNC: 173 MG/DL (ref 65–99)
GLUCOSE SERPL-MCNC: 152 MG/DL (ref 65–140)
GLUCOSE SERPL-MCNC: 165 MG/DL (ref 65–140)
GLUCOSE SERPL-MCNC: 173 MG/DL (ref 65–140)
GLUCOSE SERPL-MCNC: 220 MG/DL (ref 65–140)
GLUCOSE SERPL-MCNC: 249 MG/DL (ref 65–140)
HCT VFR BLD AUTO: 36 % (ref 34.8–46.1)
HGB BLD-MCNC: 13 G/DL (ref 11.5–15.4)
IMM GRANULOCYTES # BLD AUTO: 0.04 THOUSAND/UL (ref 0–0.2)
IMM GRANULOCYTES NFR BLD AUTO: 1 % (ref 0–2)
LYMPHOCYTES # BLD AUTO: 1.3 THOUSANDS/ΜL (ref 0.6–4.47)
LYMPHOCYTES NFR BLD AUTO: 32 % (ref 14–44)
MAGNESIUM SERPL-MCNC: 1.8 MG/DL (ref 1.6–2.6)
MCH RBC QN AUTO: 33.6 PG (ref 26.8–34.3)
MCHC RBC AUTO-ENTMCNC: 36.1 G/DL (ref 31.4–37.4)
MCV RBC AUTO: 93 FL (ref 82–98)
MONOCYTES # BLD AUTO: 0.44 THOUSAND/ΜL (ref 0.17–1.22)
MONOCYTES NFR BLD AUTO: 11 % (ref 4–12)
NEUTROPHILS # BLD AUTO: 2.13 THOUSANDS/ΜL (ref 1.85–7.62)
NEUTS SEG NFR BLD AUTO: 52 % (ref 43–75)
NRBC BLD AUTO-RTO: 0 /100 WBCS
PLATELET # BLD AUTO: 172 THOUSANDS/UL (ref 149–390)
PMV BLD AUTO: 10.4 FL (ref 8.9–12.7)
POTASSIUM SERPL-SCNC: 3 MMOL/L (ref 3.5–5.3)
RBC # BLD AUTO: 3.87 MILLION/UL (ref 3.81–5.12)
SODIUM SERPL-SCNC: 141 MMOL/L (ref 136–145)
WBC # BLD AUTO: 4.06 THOUSAND/UL (ref 4.31–10.16)

## 2018-10-21 PROCEDURE — 99232 SBSQ HOSP IP/OBS MODERATE 35: CPT | Performed by: INTERNAL MEDICINE

## 2018-10-21 PROCEDURE — 87329 GIARDIA AG IA: CPT | Performed by: INTERNAL MEDICINE

## 2018-10-21 PROCEDURE — 80048 BASIC METABOLIC PNL TOTAL CA: CPT | Performed by: PHYSICIAN ASSISTANT

## 2018-10-21 PROCEDURE — 87177 OVA AND PARASITES SMEARS: CPT | Performed by: INTERNAL MEDICINE

## 2018-10-21 PROCEDURE — 85025 COMPLETE CBC W/AUTO DIFF WBC: CPT | Performed by: PHYSICIAN ASSISTANT

## 2018-10-21 PROCEDURE — 82948 REAGENT STRIP/BLOOD GLUCOSE: CPT

## 2018-10-21 PROCEDURE — 83735 ASSAY OF MAGNESIUM: CPT | Performed by: PHYSICIAN ASSISTANT

## 2018-10-21 PROCEDURE — 99232 SBSQ HOSP IP/OBS MODERATE 35: CPT | Performed by: PHYSICIAN ASSISTANT

## 2018-10-21 PROCEDURE — 87209 SMEAR COMPLEX STAIN: CPT | Performed by: INTERNAL MEDICINE

## 2018-10-21 RX ORDER — INSULIN GLARGINE 100 [IU]/ML
12 INJECTION, SOLUTION SUBCUTANEOUS
Status: DISCONTINUED | OUTPATIENT
Start: 2018-10-21 | End: 2018-10-25

## 2018-10-21 RX ORDER — BUTALBITAL, ACETAMINOPHEN AND CAFFEINE 50; 325; 40 MG/1; MG/1; MG/1
1 TABLET ORAL EVERY 4 HOURS PRN
Status: DISCONTINUED | OUTPATIENT
Start: 2018-10-21 | End: 2018-10-27 | Stop reason: HOSPADM

## 2018-10-21 RX ORDER — MAGNESIUM SULFATE HEPTAHYDRATE 40 MG/ML
2 INJECTION, SOLUTION INTRAVENOUS ONCE
Status: COMPLETED | OUTPATIENT
Start: 2018-10-21 | End: 2018-10-21

## 2018-10-21 RX ORDER — POTASSIUM CHLORIDE 20MEQ/15ML
40 LIQUID (ML) ORAL 2 TIMES DAILY
Status: COMPLETED | OUTPATIENT
Start: 2018-10-21 | End: 2018-10-22

## 2018-10-21 RX ADMIN — CIPROFLOXACIN HYDROCHLORIDE 500 MG: 500 TABLET, FILM COATED ORAL at 08:24

## 2018-10-21 RX ADMIN — DICYCLOMINE HYDROCHLORIDE 20 MG: 20 TABLET ORAL at 22:31

## 2018-10-21 RX ADMIN — SODIUM CHLORIDE 100 ML/HR: 0.9 INJECTION, SOLUTION INTRAVENOUS at 17:11

## 2018-10-21 RX ADMIN — POTASSIUM CHLORIDE 40 MEQ: 20 SOLUTION ORAL at 09:32

## 2018-10-21 RX ADMIN — HYDROMORPHONE HYDROCHLORIDE 0.5 MG: 1 INJECTION, SOLUTION INTRAMUSCULAR; INTRAVENOUS; SUBCUTANEOUS at 05:12

## 2018-10-21 RX ADMIN — ENOXAPARIN SODIUM 40 MG: 40 INJECTION SUBCUTANEOUS at 08:24

## 2018-10-21 RX ADMIN — HYDROMORPHONE HYDROCHLORIDE 0.5 MG: 1 INJECTION, SOLUTION INTRAMUSCULAR; INTRAVENOUS; SUBCUTANEOUS at 20:09

## 2018-10-21 RX ADMIN — DICYCLOMINE HYDROCHLORIDE 20 MG: 20 TABLET ORAL at 05:14

## 2018-10-21 RX ADMIN — HYDROMORPHONE HYDROCHLORIDE 0.5 MG: 1 INJECTION, SOLUTION INTRAMUSCULAR; INTRAVENOUS; SUBCUTANEOUS at 00:57

## 2018-10-21 RX ADMIN — CIPROFLOXACIN HYDROCHLORIDE 500 MG: 500 TABLET, FILM COATED ORAL at 20:09

## 2018-10-21 RX ADMIN — DICYCLOMINE HYDROCHLORIDE 20 MG: 20 TABLET ORAL at 16:52

## 2018-10-21 RX ADMIN — INSULIN LISPRO 2 UNITS: 100 INJECTION, SOLUTION INTRAVENOUS; SUBCUTANEOUS at 11:55

## 2018-10-21 RX ADMIN — METRONIDAZOLE 500 MG: 500 TABLET ORAL at 05:14

## 2018-10-21 RX ADMIN — INSULIN LISPRO 1 UNITS: 100 INJECTION, SOLUTION INTRAVENOUS; SUBCUTANEOUS at 08:31

## 2018-10-21 RX ADMIN — INSULIN GLARGINE 12 UNITS: 100 INJECTION, SOLUTION SUBCUTANEOUS at 22:30

## 2018-10-21 RX ADMIN — INSULIN LISPRO 1 UNITS: 100 INJECTION, SOLUTION INTRAVENOUS; SUBCUTANEOUS at 22:31

## 2018-10-21 RX ADMIN — MONTELUKAST SODIUM 10 MG: 10 TABLET, FILM COATED ORAL at 08:25

## 2018-10-21 RX ADMIN — METRONIDAZOLE 500 MG: 500 TABLET ORAL at 22:33

## 2018-10-21 RX ADMIN — MAGNESIUM SULFATE HEPTAHYDRATE 2 G: 40 INJECTION, SOLUTION INTRAVENOUS at 08:44

## 2018-10-21 RX ADMIN — SODIUM CHLORIDE 100 ML/HR: 0.9 INJECTION, SOLUTION INTRAVENOUS at 05:12

## 2018-10-21 RX ADMIN — HYDROMORPHONE HYDROCHLORIDE 0.5 MG: 1 INJECTION, SOLUTION INTRAMUSCULAR; INTRAVENOUS; SUBCUTANEOUS at 14:04

## 2018-10-21 RX ADMIN — ATORVASTATIN CALCIUM 80 MG: 40 TABLET, FILM COATED ORAL at 17:22

## 2018-10-21 RX ADMIN — POTASSIUM CHLORIDE 40 MEQ: 20 SOLUTION ORAL at 17:22

## 2018-10-21 RX ADMIN — BUTALBITAL, ACETAMINOPHEN, AND CAFFEINE 1 TABLET: 50; 325; 40 TABLET ORAL at 14:03

## 2018-10-21 RX ADMIN — DICYCLOMINE HYDROCHLORIDE 20 MG: 20 TABLET ORAL at 11:54

## 2018-10-21 RX ADMIN — ASPIRIN 81 MG 81 MG: 81 TABLET ORAL at 08:24

## 2018-10-21 RX ADMIN — HYDROMORPHONE HYDROCHLORIDE 0.5 MG: 1 INJECTION, SOLUTION INTRAMUSCULAR; INTRAVENOUS; SUBCUTANEOUS at 08:34

## 2018-10-21 RX ADMIN — INSULIN LISPRO 2 UNITS: 100 INJECTION, SOLUTION INTRAVENOUS; SUBCUTANEOUS at 17:22

## 2018-10-21 RX ADMIN — METRONIDAZOLE 500 MG: 500 TABLET ORAL at 14:04

## 2018-10-21 NOTE — ASSESSMENT & PLAN NOTE
Most likely secondary to dehydration related to GI illness, monitor vitals  Continue IV fluids    This has resolved

## 2018-10-21 NOTE — PROGRESS NOTES
Progress Note - Darleen Cruz 1975, 44 y o  female MRN: [id number]    Unit/Bed#: -01 Encounter: [id number]    Primary Care Provider: Venkata Ambriz PA-C   Date and time admitted to hospital: 10/19/2018  9:31 PM    * Diarrhea   Assessment & Plan    Associated with vomiting - GI consult reviewed, continue cipro/flagyl, probiotics  Advanced to full liquid  Monitor  Continue IV fluids for now, anticipate discontinue in the next 24 hours if tolerating oral diet and diarrhea slows down    C diff is (-), stool studies are (-), await ova and parasites  CT showing evidence of colitis  Also noted abnormal liver finding  Outpatient MRI of the liver, nonemergent, per GI  Hypokalemic, related to the diarrhea with mildly low normal magnesium  Replete magnesium IV, replete potassium oral   Recheck in a m  Abdominal pain   Assessment & Plan    Diffuse, improved  Diarrhea persists  Continue Bentyl an as-needed pain medication  Gastroenteritis, acute   Assessment & Plan    With colitis, treatment as above  Hepatic lesion   Assessment & Plan    Seen on CT a/p - outpatient MRI liver protocol  Type 2 diabetes mellitus with hyperglycemia, with long-term current use of insulin (HCC)   Assessment & Plan    Continue to hold metformin at least 48 hours from admission given IV contrast  Continue sliding scale, resumed home Lantus     Sinus tachycardia-resolved as of 10/21/2018   Assessment & Plan    Most likely secondary to dehydration related to GI illness, monitor vitals  Continue IV fluids  This has resolved     Headache - likely related to the acute GI issues  Tylenol is not relieving her symptoms, she also does drink a significant amount of caffeine  Will trial Fioricet  VTE Pharmacologic Prophylaxis:   Pharmacologic: Enoxaparin (Lovenox)  Mechanical VTE Prophylaxis in Place: Yes    Patient Centered Rounds: I have performed bedside rounds with nursing staff today      Discussions with Specialists or Other Care Team Provider: GI note reviewed     Education and Discussions with Family / Patient: d/w patient and family member at bedside     Time Spent for Care: 20 minutes  More than 50% of total time spent on counseling and coordination of care as described above  Current Length of Stay: 0 day(s)    Current Patient Status: Observation   Certification Statement: The patient, admitted on an observation basis, will now require > 2 midnight hospital stay due to acute gastroenteritis requiring IV fluid  hydration, serial abdominal examinations, repeat labs    Discharge Plan: not medically ready for discharge    Code Status: Level 1 - Full Code      Subjective:   Patient seen examined, pain is only marginally better  She is still having significant flatus and diarrhea  No nausea or emesis  No subjective fevers or chills  She had a headache, Tylenol really isn't relieving the headache  Denies any muscle aches or cramps  Objective:     Vitals:   Temp (24hrs), Av 1 °F (36 7 °C), Min:98 1 °F (36 7 °C), Max:98 1 °F (36 7 °C)    Temp:  [98 1 °F (36 7 °C)] 98 1 °F (36 7 °C)  HR:  [81-92] 92  Resp:  [18] 18  BP: (96-99)/(52-58) 99/58  SpO2:  [97 %-98 %] 98 %  Body mass index is 27 85 kg/m²  Input and Output Summary (last 24 hours): Intake/Output Summary (Last 24 hours) at 10/21/18 1527  Last data filed at 10/20/18 1931   Gross per 24 hour   Intake          1491 67 ml   Output                0 ml   Net          1491 67 ml       Physical Exam:     Physical Exam   Constitutional: She appears well-developed and well-nourished  Non-toxic appearance  No distress  HENT:   Mouth/Throat: Oropharynx is clear and moist    Cardiovascular: Normal rate, regular rhythm, S1 normal, S2 normal and normal heart sounds  No murmur heard  Pulmonary/Chest: Effort normal and breath sounds normal  No respiratory distress  She has no wheezes  She has no rales  Abdominal: Soft   Bowel sounds are normal  She exhibits no distension  There is generalized tenderness (Improved)  There is no rigidity, no rebound and no guarding  Nursing note and vitals reviewed  Additional Data:     Labs:      Results from last 7 days  Lab Units 10/21/18  0503   WBC Thousand/uL 4 06*   HEMOGLOBIN g/dL 13 0   HEMATOCRIT % 36 0   PLATELETS Thousands/uL 172   NEUTROS PCT % 52   LYMPHS PCT % 32   MONOS PCT % 11   EOS PCT % 3       Results from last 7 days  Lab Units 10/21/18  0503  10/19/18  2219   SODIUM mmol/L 141  < > 131*   POTASSIUM mmol/L 3 0*  < > 3 7   CHLORIDE mmol/L 107  < > 94*   CO2 mmol/L 24  < > 23   BUN mg/dL 3*  < > 12   CREATININE mg/dL 0 50*  < > 0 95   ANION GAP mmol/L 10  < > 14*   CALCIUM mg/dL 7 7*  < > 8 9   ALBUMIN g/dL  --   --  3 8   TOTAL BILIRUBIN mg/dL  --   --  3 50*   ALK PHOS U/L  --   --  85   ALT U/L  --   --  35   AST U/L  --   --  13   < > = values in this interval not displayed  Results from last 7 days  Lab Units 10/21/18  1119 10/21/18  0555 10/20/18  2056 10/20/18  1811 10/20/18  1150 10/20/18  0750   POC GLUCOSE mg/dl 220* 152* 145* 180* 205* 211*           Results from last 7 days  Lab Units 10/20/18  1549   LACTIC ACID mmol/L 0 9           * I Have Reviewed All Lab Data Listed Above  * Additional Pertinent Lab Tests Reviewed: All Labs Within Last 24 Hours Reviewed    Imaging:    Imaging Reports Reviewed Today Include: none new   Imaging Personally Reviewed by Myself Includes:  none    Recent Cultures (last 7 days):       Results from last 7 days  Lab Units 10/20/18  0029   C DIFF TOXIN B  NEGATIVE for C difficle toxin by PCR          Last 24 Hours Medication List:     Current Facility-Administered Medications:  acetaminophen 650 mg Oral Q6H PRN Shalom Contreras PA-C    aspirin 81 mg Oral Daily Rhunette Duverney, MD    atorvastatin 80 mg Oral Daily With Darion Myles MD    butalbital-acetaminophen-caffeine 1 tablet Oral Q4H PRN Shalom Contreras PA-C    ciprofloxacin 500 mg Oral Q12H Teja Loja MD    dicyclomine 20 mg Oral 4x Daily (AC & HS) Eunice Ferraro PA-C    enoxaparin 40 mg Subcutaneous Daily Jesús Tony MD    fluticasone-vilanterol 1 puff Inhalation Daily Jesús Tony MD    HYDROmorphone 0 5 mg Intravenous Q3H PRN Nereyda Bell PA-C    insulin glargine 12 Units Subcutaneous HS Laurie Ferraro PA-C    insulin lispro 1-5 Units Subcutaneous TID AC Laurie Ferraro PA-C    insulin lispro 1-5 Units Subcutaneous HS Laurie Ferraro PA-C    losartan 25 mg Oral Daily Jesús Tony MD    metroNIDAZOLE 500 mg Oral Q8H Teja Loja MD    montelukast 10 mg Oral Daily Jesús Tony MD    potassium chloride 40 mEq Oral BID Laurie Ferraro PA-C    sodium chloride 100 mL/hr Intravenous Continuous Jesús Tony MD Last Rate: 100 mL/hr (10/21/18 0512)       Today, Patient Was Seen By: Nereyda Bell PA-C    ** Please Note: Dictation voice to text software may have been used in the creation of this document   **

## 2018-10-21 NOTE — PROGRESS NOTES
[facility]  Progress Note - Jennie Rodriguez 44 y o  female MRN: [id number]    Unit/Bed#: -01 Encounter: [id number]    Assessment/Plan:  1  Colitis- likely viral versus bacteria versus parasitic   2  Abdominal pain due to colitis  3  Hypokalemia- secondary diarrhea replete to goal of 4 and magnesium of 2  4  Liver lesion measuring approximately 2 cm x 2 on CT scan needs further evaluation with MRI as an outpatient for further characteristic would likely hemangioma      Likely cause of her colitis is related to the bacterial versus viral infection  Due to acute nature of symptoms this is likely secondary to infectious etiology  We continue antibiotics on her despite not having leukocytosis since she has not had improvement  C diff, stool enteric are negative awaiting ova/parasite and Giardia  Currently can start on clear liquids would avoid dairy and fiber  She could also get started on probiotics  Will continue follow-up  Advance diet as tolerated  Subjective:   Currently no improvement over the last 24 hr   White blood cell count within normal limits  Denies any acute distress  We may start Imodium tomorrow if diarrhea persists  Diarrhea is complicated hypokalemia  Objective:     Vitals: Blood pressure 99/58, pulse 92, temperature 98 1 °F (36 7 °C), temperature source Oral, resp  rate 18, height 5' 4" (1 626 m), weight 73 6 kg (162 lb 4 1 oz), last menstrual period 10/07/2018, SpO2 98 %  ,Body mass index is 27 85 kg/m²        Intake/Output Summary (Last 24 hours) at 10/21/18 1254  Last data filed at 10/20/18 1931   Gross per 24 hour   Intake          1491 67 ml   Output                0 ml   Net          1491 67 ml       Physical Exam:    GEN: wn/wd, NAD  HEENT: MMM, no cervical or supraclavicular LAD, anciteric  CV: RRR, no m/r/g  CHEST: CTA b/l, no w/r/r  ABD: +BS, tender to touch/ND, no hepatosplenomegaly/no rebound  EXT: no c/c/e  SKIN: no rashes  NEURO: aaox3      Invasive Devices     Peripheral Intravenous Line            Peripheral IV 10/19/18 Right Antecubital 1 day                        Lab, Imaging and other studies:     Admission on 10/19/2018   Component Date Value    Color, UA 10/19/2018 Yellow     Clarity, UA 10/19/2018 Clear     Specific Gravity, UA 10/19/2018 1 010     pH, UA 10/19/2018 6 0     Leukocytes, UA 10/19/2018 Elevated glucose may cause decreased leukocyte values   See urine microscopic for [facility] result/*    Nitrite, UA 10/19/2018 Negative     Protein, UA 10/19/2018 Negative     Glucose, UA 10/19/2018 >=1000 (1%)*    Ketones, UA 10/19/2018 15 (1+)*    Urobilinogen, UA 10/19/2018 0 2     Bilirubin, UA 10/19/2018 Small*    Blood, UA 10/19/2018 Negative     EXT PREG TEST UR (Ref: N* 10/19/2018 negative     RBC, UA 10/19/2018 None Seen     WBC, UA 10/19/2018 1-2*    Epithelial Cells 10/19/2018 Occasional     Bacteria, UA 10/19/2018 Occasional     WBC 10/19/2018 6 25     RBC 10/19/2018 5 19*    Hemoglobin 10/19/2018 17 4*    Hematocrit 10/19/2018 47 3*    MCV 10/19/2018 91     MCH 10/19/2018 33 5     MCHC 10/19/2018 36 8     RDW 10/19/2018 11 5*    MPV 10/19/2018 10 4     Platelets 89/51/1860 231     nRBC 10/19/2018 0     Neutrophils Relative 10/19/2018 62     Immat GRANS % 10/19/2018 1     Lymphocytes Relative 10/19/2018 24     Monocytes Relative 10/19/2018 11     Eosinophils Relative 10/19/2018 2     Basophils Relative 10/19/2018 0     Neutrophils Absolute 10/19/2018 3 86     Immature Grans Absolute 10/19/2018 0 04     Lymphocytes Absolute 10/19/2018 1 52     Monocytes Absolute 10/19/2018 0 69     Eosinophils Absolute 10/19/2018 0 12     Basophils Absolute 10/19/2018 0 02     Sodium 10/19/2018 131*    Potassium 10/19/2018 3 7     Chloride 10/19/2018 94*    CO2 10/19/2018 23     ANION GAP 10/19/2018 14*    BUN 10/19/2018 12     Creatinine 10/19/2018 0 95     Glucose 10/19/2018 347*    Calcium 10/19/2018 8 9     AST 10/19/2018 13     ALT 10/19/2018 35     Alkaline Phosphatase 10/19/2018 85     Total Protein 10/19/2018 8 3*    Albumin 10/19/2018 3 8     Total Bilirubin 10/19/2018 3 50*    eGFR 10/19/2018 74     Lipase 10/19/2018 85     Troponin I 10/19/2018 <0 02     pH, Adalberto 10/19/2018 7  321     pCO2, Adalberto 10/19/2018 43 9     pO2, Adalberto 10/19/2018 25 9*    HCO3, Adalberto 10/19/2018 22 2*    Base Excess, Adalberto 10/19/2018 -4 0     O2 Content, Adalberto 10/19/2018 13 0     O2 HGB, VENOUS 10/19/2018 49 5*    Acetone, Bld 10/19/2018 Negative     Osmolality Serum 10/19/2018 290     Salmonella sp PCR 10/20/2018 None Detected     Shigella sp/Enteroinvasi* 10/20/2018 None Detected     Campylobacter sp (jejuni* 10/20/2018 None Detected     Shiga toxin 1/Shiga toxi* 10/20/2018 None Detected     C difficile toxin by PCR 10/20/2018 NEGATIVE for C difficle toxin by PCR        Sodium 10/20/2018 138     Potassium 10/20/2018 3 1*    Chloride 10/20/2018 103     CO2 10/20/2018 23     ANION GAP 10/20/2018 12     BUN 10/20/2018 7     Creatinine 10/20/2018 0 59*    Glucose 10/20/2018 234*    Glucose, Fasting 10/20/2018 234*    Calcium 10/20/2018 7 6*    eGFR 10/20/2018 113     Magnesium 10/20/2018 1 5*    Phosphorus 10/20/2018 2 2*    WBC 10/20/2018 4 69     RBC 10/20/2018 4 10     Hemoglobin 10/20/2018 13 9     Hematocrit 10/20/2018 37 9     MCV 10/20/2018 92     MCH 10/20/2018 33 9     MCHC 10/20/2018 36 7     RDW 10/20/2018 11 7     MPV 10/20/2018 10 5     Platelets 55/46/3828 181     nRBC 10/20/2018 0     Neutrophils Relative 10/20/2018 40*    Immat GRANS % 10/20/2018 1     Lymphocytes Relative 10/20/2018 43     Monocytes Relative 10/20/2018 12     Eosinophils Relative 10/20/2018 4     Basophils Relative 10/20/2018 0     Neutrophils Absolute 10/20/2018 1 89     Immature Grans Absolute 10/20/2018 0 03     Lymphocytes Absolute 10/20/2018 2 00     Monocytes Absolute 10/20/2018 0 58     Eosinophils Absolute 10/20/2018 0 17     Basophils Absolute 10/20/2018 0 02     Sed Rate 10/20/2018 11     CRP 10/20/2018 64 2*    POC Glucose 10/20/2018 211*    POC Glucose 10/20/2018 205*    LACTIC ACID 10/20/2018 0 9     POC Glucose 10/20/2018 180*    POC Glucose 10/20/2018 145*    WBC 10/21/2018 4 06*    RBC 10/21/2018 3 87     Hemoglobin 10/21/2018 13 0     Hematocrit 10/21/2018 36 0     MCV 10/21/2018 93     MCH 10/21/2018 33 6     MCHC 10/21/2018 36 1     RDW 10/21/2018 11 6     MPV 10/21/2018 10 4     Platelets 22/30/1223 172     nRBC 10/21/2018 0     Neutrophils Relative 10/21/2018 52     Immat GRANS % 10/21/2018 1     Lymphocytes Relative 10/21/2018 32     Monocytes Relative 10/21/2018 11     Eosinophils Relative 10/21/2018 3     Basophils Relative 10/21/2018 1     Neutrophils Absolute 10/21/2018 2 13     Immature Grans Absolute 10/21/2018 0 04     Lymphocytes Absolute 10/21/2018 1 30     Monocytes Absolute 10/21/2018 0 44     Eosinophils Absolute 10/21/2018 0 13     Basophils Absolute 10/21/2018 0 02     Sodium 10/21/2018 141     Potassium 10/21/2018 3 0*    Chloride 10/21/2018 107     CO2 10/21/2018 24     ANION GAP 10/21/2018 10     BUN 10/21/2018 3*    Creatinine 10/21/2018 0 50*    Glucose 10/21/2018 173*    Glucose, Fasting 10/21/2018 173*    Calcium 10/21/2018 7 7*    eGFR 10/21/2018 120     Magnesium 10/21/2018 1 8     POC Glucose 10/21/2018 152*    POC Glucose 10/21/2018 220*         I have personally reviewed pertinent reports        Current Facility-Administered Medications   Medication Dose Route Frequency    acetaminophen (TYLENOL) tablet 650 mg  650 mg Oral Q6H PRN    aspirin chewable tablet 81 mg  81 mg Oral Daily    atorvastatin (LIPITOR) tablet 80 mg  80 mg Oral Daily With Dinner    ciprofloxacin (CIPRO) tablet 500 mg  500 mg Oral Q12H [facility]    dicyclomine (BENTYL) tablet 20 mg  20 mg Oral 4x Daily (AC & HS)    enoxaparin (LOVENOX) subcutaneous injection 40 mg  40 mg Subcutaneous Daily    fluticasone-vilanterol (BREO ELLIPTA) 100-25 mcg/inh inhaler 1 puff  1 puff Inhalation Daily    HYDROmorphone (DILAUDID) injection 0 5 mg  0 5 mg Intravenous Q3H PRN    insulin glargine (LANTUS) subcutaneous injection 12 Units 0 12 mL  12 Units Subcutaneous HS    insulin lispro (HumaLOG) 100 units/mL subcutaneous injection 1-5 Units  1-5 Units Subcutaneous TID AC    insulin lispro (HumaLOG) 100 units/mL subcutaneous injection 1-5 Units  1-5 Units Subcutaneous HS    losartan (COZAAR) tablet 25 mg  25 mg Oral Daily    metroNIDAZOLE (FLAGYL) tablet 500 mg  500 mg Oral Q8H [address]    montelukast (SINGULAIR) tablet 10 mg  10 mg Oral Daily    potassium chloride 10 % oral solution 40 mEq  40 mEq Oral BID    sodium chloride 0 9 % infusion  100 mL/hr Intravenous Continuous

## 2018-10-21 NOTE — ASSESSMENT & PLAN NOTE
Continue to hold metformin at least 48 hours from admission given IV contrast  Continue sliding scale, resumed home Lantus

## 2018-10-21 NOTE — ASSESSMENT & PLAN NOTE
Associated with vomiting - GI consult reviewed, continue cipro/flagyl, probiotics  Advanced to full liquid  Monitor  Continue IV fluids for now, anticipate discontinue in the next 24 hours if tolerating oral diet and diarrhea slows down    C diff is (-), stool studies are (-), await ova and parasites  CT showing evidence of colitis  Also noted abnormal liver finding  Outpatient MRI of the liver, nonemergent, per GI

## 2018-10-21 NOTE — UTILIZATION REVIEW
Continued Stay Review    Date: 10/21/18    Vital Signs: BP 99/58 (BP Location: Left arm)   Pulse 92   Temp 98 1 °F (36 7 °C) (Oral)   Resp 18   Ht 5' 4" (1 626 m)   Wt 73 6 kg (162 lb 4 1 oz)   LMP 10/07/2018   SpO2 98%   BMI 27 85 kg/m²     Medications:   Scheduled Meds:   Current Facility-Administered Medications:  acetaminophen 650 mg Oral Q6H PRN   aspirin 81 mg Oral Daily   atorvastatin 80 mg Oral Daily With Dinner   butalbital-acetaminophen-caffeine 1 tablet Oral Q4H PRN   ciprofloxacin 500 mg Oral Q12H MO   dicyclomine 20 mg Oral 4x Daily (AC & HS)   enoxaparin 40 mg Subcutaneous Daily   fluticasone-vilanterol 1 puff Inhalation Daily   HYDROmorphone 0 5 mg Intravenous Q3H PRN   insulin glargine 12 Units Subcutaneous HS   insulin lispro 1-5 Units Subcutaneous TID AC   insulin lispro 1-5 Units Subcutaneous HS   losartan 25 mg Oral Daily   metroNIDAZOLE 500 mg Oral Q8H [facility]   montelukast 10 mg Oral Daily   potassium chloride 40 mEq Oral BID   sodium chloride 100 mL/hr Intravenous Continuous     Continuous Infusions:   sodium chloride 100 mL/hr Last Rate: 100 mL/hr (10/21/18 0512)     PRN Meds:   acetaminophen    butalbital-acetaminophen-caffeine    HYDROmorphone    Abnormal Labs/Diagnostic Results:   WBC 4 06*     POTASSIUM 3 0*   BUN 3*   CREATININE 0 50*   CALCIUM 7 7*     Glucose 220    Age/Sex: 44 y o  female     Assessment/Plan:   * Diarrhea   Assessment & Plan     Associated with vomiting - GI consult reviewed, continue cipro/flagyl, probiotics  Advanced to full liquid  Monitor  Continue IV fluids for now, anticipate discontinue in the next 24 hours if tolerating oral diet and diarrhea slows down     C diff is (-), stool studies are (-), await ova and parasites  CT showing evidence of colitis  Also noted abnormal liver finding  Outpatient MRI of the liver, nonemergent, per GI      Hypokalemic, related to the diarrhea with mildly low normal magnesium    Replete magnesium IV, replete potassium oral   Recheck in a m       Abdominal pain   Assessment & Plan     Diffuse, improved  Diarrhea persists  Continue Bentyl an as-needed pain medication       Gastroenteritis, acute   Assessment & Plan     With colitis, treatment as above       Hepatic lesion   Assessment & Plan     Seen on CT a/p - outpatient MRI liver protocol       Type 2 diabetes mellitus with hyperglycemia, with long-term current use of insulin (HCC)   Assessment & Plan     Continue to hold metformin at least 48 hours from admission given IV contrast  Continue sliding scale, resumed home Lantus      Sinus tachycardia-resolved as of 10/21/2018   Assessment & Plan     Most likely secondary to dehydration related to GI illness, monitor vitals  Continue IV fluids  This has resolved      Headache - likely related to the acute GI issues  Tylenol is not relieving her symptoms, she also does drink a significant amount of caffeine  Will trial Fioricet      VTE Pharmacologic Prophylaxis:   Pharmacologic: Enoxaparin (Lovenox)  Mechanical VTE Prophylaxis in Place: Yes     Current Patient Status: Observation   Certification Statement: The patient, admitted on an observation basis, will now require > 2 midnight hospital stay due to acute gastroenteritis requiring IV fluid  hydration, serial abdominal examinations, repeat labs     Discharge Plan: not medically ready for discharge       Thank you,  [address] [place] [address] Utilization Review Department  Phone: [phone]; Fax [phone]  ATTENTION: Please call with any questions or concerns to [phone]  and carefully follow the prompts so that you are directed to the right person  Send all requests for admission clinical reviews, approved or denied determinations and any other requests to fax [phone]   All voicemails are confidential

## 2018-10-21 NOTE — SOCIAL WORK
CM met with pt at bedside  Pt alert  Family present  CM reviewed observation status  Pt signed obs notice  Obs notice placed in medical records  Cough

## 2018-10-22 LAB
ANION GAP SERPL CALCULATED.3IONS-SCNC: 7 MMOL/L (ref 4–13)
BASOPHILS # BLD AUTO: 0.01 THOUSANDS/ΜL (ref 0–0.1)
BASOPHILS NFR BLD AUTO: 0 % (ref 0–1)
BUN SERPL-MCNC: 2 MG/DL (ref 5–25)
CALCIUM SERPL-MCNC: 8.1 MG/DL (ref 8.3–10.1)
CHLORIDE SERPL-SCNC: 108 MMOL/L (ref 100–108)
CO2 SERPL-SCNC: 26 MMOL/L (ref 21–32)
CREAT SERPL-MCNC: 0.63 MG/DL (ref 0.6–1.3)
EOSINOPHIL # BLD AUTO: 0.1 THOUSAND/ΜL (ref 0–0.61)
EOSINOPHIL NFR BLD AUTO: 3 % (ref 0–6)
ERYTHROCYTE [DISTWIDTH] IN BLOOD BY AUTOMATED COUNT: 11.4 % (ref 11.6–15.1)
GFR SERPL CREATININE-BSD FRML MDRD: 111 ML/MIN/1.73SQ M
GLUCOSE SERPL-MCNC: 221 MG/DL (ref 65–140)
GLUCOSE SERPL-MCNC: 260 MG/DL (ref 65–140)
GLUCOSE SERPL-MCNC: 274 MG/DL (ref 65–140)
GLUCOSE SERPL-MCNC: 281 MG/DL (ref 65–140)
GLUCOSE SERPL-MCNC: 282 MG/DL (ref 65–140)
HCT VFR BLD AUTO: 34 % (ref 34.8–46.1)
HGB BLD-MCNC: 12.7 G/DL (ref 11.5–15.4)
IMM GRANULOCYTES # BLD AUTO: 0.02 THOUSAND/UL (ref 0–0.2)
IMM GRANULOCYTES NFR BLD AUTO: 1 % (ref 0–2)
LYMPHOCYTES # BLD AUTO: 1.58 THOUSANDS/ΜL (ref 0.6–4.47)
LYMPHOCYTES NFR BLD AUTO: 41 % (ref 14–44)
MAGNESIUM SERPL-MCNC: 1.7 MG/DL (ref 1.6–2.6)
MCH RBC QN AUTO: 34.4 PG (ref 26.8–34.3)
MCHC RBC AUTO-ENTMCNC: 37.4 G/DL (ref 31.4–37.4)
MCV RBC AUTO: 92 FL (ref 82–98)
MONOCYTES # BLD AUTO: 0.43 THOUSAND/ΜL (ref 0.17–1.22)
MONOCYTES NFR BLD AUTO: 11 % (ref 4–12)
NEUTROPHILS # BLD AUTO: 1.74 THOUSANDS/ΜL (ref 1.85–7.62)
NEUTS SEG NFR BLD AUTO: 44 % (ref 43–75)
NRBC BLD AUTO-RTO: 0 /100 WBCS
PLATELET # BLD AUTO: 166 THOUSANDS/UL (ref 149–390)
PMV BLD AUTO: 10.3 FL (ref 8.9–12.7)
POTASSIUM SERPL-SCNC: 3.8 MMOL/L (ref 3.5–5.3)
RBC # BLD AUTO: 3.69 MILLION/UL (ref 3.81–5.12)
SODIUM SERPL-SCNC: 141 MMOL/L (ref 136–145)
WBC # BLD AUTO: 3.88 THOUSAND/UL (ref 4.31–10.16)

## 2018-10-22 PROCEDURE — 85025 COMPLETE CBC W/AUTO DIFF WBC: CPT | Performed by: PHYSICIAN ASSISTANT

## 2018-10-22 PROCEDURE — 82948 REAGENT STRIP/BLOOD GLUCOSE: CPT

## 2018-10-22 PROCEDURE — 80048 BASIC METABOLIC PNL TOTAL CA: CPT | Performed by: PHYSICIAN ASSISTANT

## 2018-10-22 PROCEDURE — 83735 ASSAY OF MAGNESIUM: CPT | Performed by: PHYSICIAN ASSISTANT

## 2018-10-22 PROCEDURE — 99232 SBSQ HOSP IP/OBS MODERATE 35: CPT | Performed by: PHYSICIAN ASSISTANT

## 2018-10-22 RX ADMIN — POTASSIUM CHLORIDE 40 MEQ: 20 SOLUTION ORAL at 17:14

## 2018-10-22 RX ADMIN — FLUTICASONE FUROATE AND VILANTEROL TRIFENATATE 1 PUFF: 100; 25 POWDER RESPIRATORY (INHALATION) at 09:41

## 2018-10-22 RX ADMIN — METRONIDAZOLE 500 MG: 500 TABLET ORAL at 13:09

## 2018-10-22 RX ADMIN — METRONIDAZOLE 500 MG: 500 TABLET ORAL at 22:28

## 2018-10-22 RX ADMIN — HYDROMORPHONE HYDROCHLORIDE 0.5 MG: 1 INJECTION, SOLUTION INTRAMUSCULAR; INTRAVENOUS; SUBCUTANEOUS at 19:43

## 2018-10-22 RX ADMIN — SODIUM CHLORIDE 100 ML/HR: 0.9 INJECTION, SOLUTION INTRAVENOUS at 23:04

## 2018-10-22 RX ADMIN — SODIUM CHLORIDE 100 ML/HR: 0.9 INJECTION, SOLUTION INTRAVENOUS at 13:09

## 2018-10-22 RX ADMIN — HYDROMORPHONE HYDROCHLORIDE 0.5 MG: 1 INJECTION, SOLUTION INTRAMUSCULAR; INTRAVENOUS; SUBCUTANEOUS at 09:33

## 2018-10-22 RX ADMIN — DICYCLOMINE HYDROCHLORIDE 20 MG: 20 TABLET ORAL at 16:39

## 2018-10-22 RX ADMIN — CIPROFLOXACIN HYDROCHLORIDE 500 MG: 500 TABLET, FILM COATED ORAL at 09:40

## 2018-10-22 RX ADMIN — INSULIN LISPRO 3 UNITS: 100 INJECTION, SOLUTION INTRAVENOUS; SUBCUTANEOUS at 09:37

## 2018-10-22 RX ADMIN — INSULIN GLARGINE 12 UNITS: 100 INJECTION, SOLUTION SUBCUTANEOUS at 22:28

## 2018-10-22 RX ADMIN — DICYCLOMINE HYDROCHLORIDE 20 MG: 20 TABLET ORAL at 06:28

## 2018-10-22 RX ADMIN — ASPIRIN 81 MG 81 MG: 81 TABLET ORAL at 09:40

## 2018-10-22 RX ADMIN — DICYCLOMINE HYDROCHLORIDE 20 MG: 20 TABLET ORAL at 22:28

## 2018-10-22 RX ADMIN — HYDROMORPHONE HYDROCHLORIDE 0.5 MG: 1 INJECTION, SOLUTION INTRAMUSCULAR; INTRAVENOUS; SUBCUTANEOUS at 23:04

## 2018-10-22 RX ADMIN — ENOXAPARIN SODIUM 40 MG: 40 INJECTION SUBCUTANEOUS at 09:38

## 2018-10-22 RX ADMIN — POTASSIUM CHLORIDE 40 MEQ: 20 SOLUTION ORAL at 09:40

## 2018-10-22 RX ADMIN — MONTELUKAST SODIUM 10 MG: 10 TABLET, FILM COATED ORAL at 09:40

## 2018-10-22 RX ADMIN — SODIUM CHLORIDE 100 ML/HR: 0.9 INJECTION, SOLUTION INTRAVENOUS at 03:48

## 2018-10-22 RX ADMIN — INSULIN LISPRO 2 UNITS: 100 INJECTION, SOLUTION INTRAVENOUS; SUBCUTANEOUS at 16:40

## 2018-10-22 RX ADMIN — LOSARTAN POTASSIUM 25 MG: 25 TABLET, FILM COATED ORAL at 09:40

## 2018-10-22 RX ADMIN — ATORVASTATIN CALCIUM 80 MG: 40 TABLET, FILM COATED ORAL at 16:40

## 2018-10-22 RX ADMIN — INSULIN LISPRO 2 UNITS: 100 INJECTION, SOLUTION INTRAVENOUS; SUBCUTANEOUS at 13:08

## 2018-10-22 RX ADMIN — INSULIN LISPRO 3 UNITS: 100 INJECTION, SOLUTION INTRAVENOUS; SUBCUTANEOUS at 22:29

## 2018-10-22 RX ADMIN — CIPROFLOXACIN HYDROCHLORIDE 500 MG: 500 TABLET, FILM COATED ORAL at 22:00

## 2018-10-22 RX ADMIN — METRONIDAZOLE 500 MG: 500 TABLET ORAL at 06:28

## 2018-10-22 RX ADMIN — DICYCLOMINE HYDROCHLORIDE 20 MG: 20 TABLET ORAL at 13:09

## 2018-10-22 NOTE — UTILIZATION REVIEW
OBS  10/20 @ 0005 converted to IP on 10/21  2241 for continued treatment of Diarrhea need for iv hydration and monitor of labs   Admitting Physician NICKI SNYDER    Level of Care Med Surg    Estimated length of stay More than 2 Midnights    Certification I certify that inpatient services are medically necessary for this patient for a duration of greater than two midnights  See H&P and MD Progress Notes for additional information about the patient's course of treatment  Initial Clinical Review     ADMIT   OBS  On  10/20  @  0005           Orders Placed This Encounter   Procedures    Place in Observation (expected length of stay for this patient is less than two midnights)       Standing Status:   Standing       Number of Occurrences:   1       Order Specific Question:   Admitting Physician       Answer:   Sheryle Ancona [id number]       Order Specific Question:   Level of Care       Answer:   Med Surg [16]            ED: Date/Time/Mode of Arrival:             ED Arrival Information      Expected Arrival Acuity Means of Arrival Escorted By Service Admission Type     - 10/19/2018 20:29 Urgent Walk-In Family Member General Medicine Urgent     Arrival Complaint     ABD PAIN             Chief Complaint:        Chief Complaint   Patient presents with    Abdominal Pain       patient is c/o abdominal pain, back pain, headache and diarrhea x 5 days           History of Illness:   44 y  o  female obese with with past medical history of diabetes type 1, asthma and fibromyalgia   She presented to the emergency for evaluation of 5 days of persistent diarrhea and vomiting associated with diffuse abdominal pain and discomfort  - sinus tachycardia at presentation   also had multiple episode of diarrhea in ER  -  CT abdominopelvic noted several abnormal findings that include acute enteritis, colitis and hepatic lesion on the right lobe   Stool sample sent for evaluation and she has been admitted for further evaluation and management     ED Vital Signs:      10/20/18 0754   98 1 °F (36 7 °C)   91   18   107/66   --   None (Room air)   Lying   10/19/18 2310   --   103   18   126/80   97 %   None (Room air)   Lying   10/19/18 2056   98 3 °F (36 8 °C)    114   18   134/78   99 %   None (Room air)          72 6 kg (160 lb)     Abnormal Labs/Diagnostic Test Results:   vBG   7 321/  43 9/ 25 9/ 22 2  Na  131   138  K  3 7   3 1  crt  0 95   0 59  Glucose  347   234  Tot bili  3 50  Phos  2 2  Mg  1 5  Hgb  17 4  13 9  hct  47 3   37 9  UA  (+)glucose and ketone      sg 1 010   Nitrite neg     Elevated leuks , occ bacteria      ED Treatment:              Medication Administration from 10/19/2018 2029 to 10/20/2018 1337        Date/Time Order Dose Route Action Action by Comments                           10/19/2018 2223 sodium chloride 0 9 % bolus 1,000 mL 1,000 mL Intravenous New Bag Nima Swanson RN         10/19/2018 2223 ondansetron (ZOFRAN) injection 4 mg 4 mg Intravenous Given Nima Swanson RN         10/19/2018 2223 HYDROmorphone (DILAUDID) injection 0 5 mg 0 5 mg Intravenous Given Nima Swanson RN                                                 10/20/2018 0054 sodium chloride 0 9 % bolus 1,000 mL 1,000 mL Intravenous New Bag Nima Swanson RN         10/20/2018 0054 dicyclomine (BENTYL) tablet 20 mg 20 mg Oral Given Nima Swanson RN         10/20/2018 1005 aspirin chewable tablet 81 mg 81 mg Oral Given Mirlande Ruggiero RN         10/20/2018 0833 fluticasone-vilanterol (BREO ELLIPTA) 100-25 mcg/inh inhaler 1 puff 1 puff Inhalation Not Given Mirlande Ruggiero RN         10/20/2018 1057 losartan (COZAAR) tablet 25 mg 25 mg Oral Given Mirlande Ruggiero RN         10/20/2018 0831 metFORMIN (GLUCOPHAGE) tablet 1,000 mg 1,000 mg Oral Given Mirlande Ruggiero RN         10/20/2018 1057 montelukast (SINGULAIR) tablet 10 mg 10 mg Oral Given Mirlande Ruggiero RN         10/20/2018 1011 enoxaparin (LOVENOX) subcutaneous injection 40 mg 40 mg Subcutaneous Given Marti Paul RN         10/20/2018 0436 sodium chloride 0 9 % infusion 100 mL/hr Intravenous New Bag Marcelo Ibrahim, LYNETTE         10/20/2018 1150 dicyclomine (BENTYL) tablet 20 mg 20 mg Oral Given Marti Paul, LYNETTE         10/20/2018 1228 ciprofloxacin (CIPRO) tablet 500 mg 500 mg Oral Given Socorro Cradle, RN         10/20/2018 1327 HYDROmorphone (DILAUDID) injection 0 5 mg 0 5 mg Intravenous Given Marti Paul RN               Past Medical/Surgical History:   Asthma, DM, fibromyalgia      Admitting Diagnosis: Abdominal pain [R10 9]     Assessment/Plan:         Gastroenteritis, acute   Assessment & Plan     With colitis, probably viral versus inflammatory, patient will be made NPO, consult Gastroenterology, will sent stool for evaluation, and follow up with chemistry and CBC in a m  ESR/C-reactive protein      Hepatic lesion   Assessment & Plan     Identify with CT abdominopelvic under right hepatic lobe, with other abnormal findings to include enteritis and colitis and possible inflammatory bowel disease, will consult Gastroenterology  Geoffrey Peat ultrasound for further hepatic elucidation       Abdominal pain   Assessment & Plan     Diffuse and persistent x5 days probably secondary to gastroenteritis versus inflammatory bowel disease, follow-up with GI, will make NPO, status post 2 L of normal saline in the emergency, continue normal saline at 100 cc/hour       * Diarrhea   Assessment & Plan     Associated with vomiting most likely secondary to gastroenteritis, stool sent for evaluation, and monitor       Hyperglycemia   Assessment & Plan     Appear to be improving, given 2 L of normal saline in the emergency, will continue with normal saline at 100 cc/hour and follow up on insulin sliding scale       Sinus tachycardia   Assessment & Plan     Most likely secondary to dehydration from vomiting and persistent diarrhea, given 2 L of normal saline in the emergency with some improvement, will continue with normal saline at 100 cc/hour   And monitor very closely           10/20    Admission Orders:  Admit med surg  Npo  Gi consult  IVF @ 100   accucks qid w/sliding scale insulin  Stool studies         10/20  GI CONSULT   1  Colitis- likely viral versus bacteria versus parasitic   2   Abdominal pain due to colitis      Likely cause of her colitis is related to the bacterial versus viral infection   Due to acute nature of symptoms this is likely secondary to infectious etiology   We will start antibiotics on her despite not having leukocytosis since she is not improving and her symptoms are somewhat getting worse   Currently can start on clear liquids would avoid dairy and fiber   She could also get started on probiotics   I would check stool studies for community-acquired C diff, bacteria, parasite, Giardia   She can be considered for an outpatient colonoscopy      10/20    Scheduled Meds:   Current Facility-Administered Medications:  aspirin 81 mg Oral Daily Chico Patel MD     atorvastatin 80 mg Oral Daily With Eula Mary MD     ciprofloxacin 500 mg Oral Q12H Anya Mckeon MD     dicyclomine 20 mg Oral 4x Daily (AC & HS) Laurie Ferraro PA-C     enoxaparin 40 mg Subcutaneous Daily Chico Patel MD     fluticasone-vilanterol 1 puff Inhalation Daily Chico Patel MD     losartan 25 mg Oral Daily Chico Patel MD     metFORMIN 1,000 mg Oral BID With Meals Chico Patel MD     metroNIDAZOLE 500 mg Oral Q8H Anya Mckeon MD     montelukast 10 mg Oral Daily Chico Patel MD     sodium chloride 100 mL/hr Intravenous Continuous Donny Henry MD Last Rate: 100 mL/hr (10/20/18 5846)

## 2018-10-22 NOTE — PROGRESS NOTES
Progress Note - Nolberto Li 1975, 44 y o  female MRN: [id number]    Unit/Bed#: -01 Encounter: [id number]    Primary Care Provider: Dennis Rodriguez PA-C   Date and time admitted to hospital: 10/19/2018  9:31 PM        * Diarrhea   Assessment & Plan    Acute colitis - likely viral vs  Bacterial vs  Parasitic  GI input appreciated  Continue cipro/flagyl, probiotics  On full liquids  Monitor  She is slowly improving (no further vomiting, still with diarrhea)  ? Possible discharge to home tomorrow if continues with improvement  C diff is (-), stool studies are (-), await ova and parasites and Giardia  Also noted abnormal liver finding with liver lesion  Will need outpatient MRI of the liver to characterize the lesion/outpatient follow up with GI  Type 2 diabetes mellitus with hyperglycemia, with long-term current use of insulin (HCC)   Assessment & Plan    Continue to hold metformin at least 48 hours from admission given IV contrast   Continue sliding scale, POCs, resumed home Lantus  Hepatic lesion   Assessment & Plan    Seen on CT a/p - outpatient MRI liver protocol  Abdominal pain   Assessment & Plan    Diffuse, improving  Diarrhea persists  Continue Bentyl an as-needed pain medication  VTE Pharmacologic Prophylaxis:   Pharmacologic: Enoxaparin (Lovenox)  Mechanical VTE Prophylaxis in Place: Yes    Patient Centered Rounds: I have performed bedside rounds with nursing staff today  Discussions with Specialists or Other Care Team Provider: Appreciate GI input  Education and Discussions with Family / Patient: Discussed with patient and family at bedside  Time Spent for Care: 30 minutes  More than 50% of total time spent on counseling and coordination of care as described above      Current Length of Stay: 1 day(s)    Current Patient Status: Inpatient   Certification Statement: The patient will continue to require additional inpatient hospital stay due to management of acute colitis  Discharge Plan: Awaiting stabilization - possibly in next 24 hours  Code Status: Level 1 - Full Code      Subjective:   Patient reports she is continuing with diarrhea but somewhat less  Abdominal pain persists but less  No vomiting  No CP or SOB  Objective:     Vitals:   Temp (24hrs), Av 8 °F (37 1 °C), Min:98 5 °F (36 9 °C), Max:99 1 °F (37 3 °C)    Temp:  [98 5 °F (36 9 °C)-99 1 °F (37 3 °C)] 98 5 °F (36 9 °C)  HR:  [72-87] 72  Resp:  [18] 18  BP: ()/(56-71) 110/71  SpO2:  [95 %-97 %] 97 %  Body mass index is 27 85 kg/m²  Input and Output Summary (last 24 hours): Intake/Output Summary (Last 24 hours) at 10/22/18 1644  Last data filed at 10/22/18 1308   Gross per 24 hour   Intake           33 ml   Output                0 ml   Net           33 ml       Physical Exam:     Physical Exam   Constitutional: She is oriented to person, place, and time  She appears well-developed and well-nourished  No distress  HENT:   Head: Normocephalic and atraumatic  Eyes: No scleral icterus  Neck: Neck supple  Cardiovascular: Normal rate  Pulmonary/Chest: Effort normal and breath sounds normal  No respiratory distress  She has no wheezes  She has no rales  Abdominal: Soft  Bowel sounds are normal  She exhibits no distension  There is tenderness  There is no rebound  Mild TTP in lower quadrants  Musculoskeletal: She exhibits no edema  Neurological: She is alert and oriented to person, place, and time  Vitals reviewed           Additional Data:     Labs:      Results from last 7 days  Lab Units 10/22/18  0453   WBC Thousand/uL 3 88*   HEMOGLOBIN g/dL 12 7   HEMATOCRIT % 34 0*   PLATELETS Thousands/uL 166   NEUTROS PCT % 44   LYMPHS PCT % 41   MONOS PCT % 11   EOS PCT % 3       Results from last 7 days  Lab Units 10/22/18  0453  10/19/18  2219   SODIUM mmol/L 141  < > 131*   POTASSIUM mmol/L 3 8  < > 3 7   CHLORIDE mmol/L 108  < > 94*   CO2 mmol/L 26  < > 23 BUN mg/dL 2*  < > 12   CREATININE mg/dL 0 63  < > 0 95   CALCIUM mg/dL 8 1*  < > 8 9   ALK PHOS U/L  --   --  85   ALT U/L  --   --  35   AST U/L  --   --  13   < > = values in this interval not displayed  * I Have Reviewed All Lab Data Listed Above  * Additional Pertinent Lab Tests Reviewed: All Labs Within Last 24 Hours Reviewed    Imaging:    Imaging Reports Reviewed Today Include: No new imaging today  Recent Cultures (last 7 days):       Results from last 7 days  Lab Units 10/20/18  0029   C DIFF TOXIN B  NEGATIVE for C difficle toxin by PCR  Last 24 Hours Medication List:     Current Facility-Administered Medications:  acetaminophen 650 mg Oral Q6H PRN Felitha IAN Bender    aspirin 81 mg Oral Daily Nikunj Sheppard MD    atorvastatin 80 mg Oral Daily With Destiney Talavera MD    butalbital-acetaminophen-caffeine 1 tablet Oral Q4H PRN Felitha IAN Bender    ciprofloxacin 500 mg Oral Q12H Ivana Marlow MD    dicyclomine 20 mg Oral 4x Daily (AC & HS) Martha Scott Ferraro PA-C    enoxaparin 40 mg Subcutaneous Daily Nikunj Sheppard MD    fluticasone-vilanterol 1 puff Inhalation Daily Nikunj Sheppard MD    HYDROmorphone 0 5 mg Intravenous Q3H PRN Girtha VARUN Bender-NESSA    insulin glargine 12 Units Subcutaneous HS VARUN Carter-NESSA    insulin lispro 1-5 Units Subcutaneous TID AC VARUN Carter-C    insulin lispro 1-5 Units Subcutaneous HS Laurie Ferraro PA-C    losartan 25 mg Oral Daily Donny Black MD    metroNIDAZOLE 500 mg Oral Q8H Ivana Marlow MD    montelukast 10 mg Oral Daily Nikunj Sheppard MD    potassium chloride 40 mEq Oral BID Laurie Ferraro PA-C    sodium chloride 100 mL/hr Intravenous Continuous Donny Black MD Last Rate: 100 mL/hr (10/22/18 1309)        Today, Patient Was Seen By: Jose E Parisi PA-C    ** Please Note: Dictation voice to text software may have been used in the creation of this document   **

## 2018-10-22 NOTE — PHYSICIAN ADVISOR
Current patient class: Observation  The patient is currently on Hospital Day: 3 at [address]      The patient was admitted to the hospital at N/A on N/A for the following diagnosis:  Diarrhea [R19 7]  Enteritis [K52 9]  Abdominal pain [R10 9]  Hyperglycemia [R73 9]  Gastroenteritis, acute [K52 9]  Hepatic lesion [K76 9]  Bowel wall thickening [K63 9]       There is documentation in the medical record of an expected length of stay of at least 2 midnights  The patient is therefore expected to satisfy the 2 midnight benchmark and given the 2 midnight presumption is appropriate for INPATIENT ADMISSION  Given this expectation of a satisfying stay, CMS instructs us that the patient is most often appropriate for inpatient admission under part A provided medical necessity is documented in the chart  After review of the relevant documentation, labs, vital signs and test results, the patient is appropriate for INPATIENT ADMISSION  Admission to the hospital as an inpatient is a complex decision making process which requires the practitioner to consider the patients presenting complaint, history and physical examination and all relevant testing  With this in mind, in this case, the patient was deemed appropriate for INPATIENT ADMISSION  After review of the documentation and testing available at the time of the admission I concur with this clinical determination of medical necessity  Rationale is as follows: The patient is a 44 yrs old Female who presented to the ED at 10/19/2018  9:31 PM with a chief complaint of Abdominal Pain (patient is c/o abdominal pain, back pain, headache and diarrhea x 5 days  )     Given the need for further hospitalization, and along with the documentation of medical necessity present in the chart, the patient is appropriate for inpatient admission  The patient is expected to satisfy the 2 midnight benchmark, and will require further acute medical care   The patient does have comorbid conditions which increases the risk for significant adverse outcome  Given this the patient is appropriate for inpatient admission  The patients vitals on arrival were ED Triage Vitals [10/19/18 2056]   Temperature Pulse Respirations Blood Pressure SpO2   98 3 °F (36 8 °C) (!) 114 18 134/78 99 %      Temp Source Heart Rate Source Patient Position - Orthostatic VS BP Location FiO2 (%)   Oral Monitor Sitting Right arm --      Pain Score       Worst Possible Pain           Past Medical History:   Diagnosis Date    Asthma     Diabetes mellitus ([address] )     Fibromyalgia      Past Surgical History:   Procedure Laterality Date     SECTION      CHOLECYSTECTOMY      TUBAL LIGATION             Consults have been placed to:   IP CONSULT TO GASTROENTEROLOGY    Vitals:    10/20/18 1500 10/20/18 2319 10/21/18 0700 10/21/18 1500   BP: 93/58 96/52 99/58 105/59   BP Location:  Left arm Left arm Left arm   Pulse: 87 81 92 85   Resp:  18   Temp: (!) 97 4 °F (36 3 °C) 98 1 °F (36 7 °C) 98 1 °F (36 7 °C) 98 1 °F (36 7 °C)   TempSrc: Oral Oral Oral Oral   SpO2: 93% 97% 98% 94%   Weight:       Height:           Most recent labs:    Recent Labs      10/19/18   2219   10/21/18   0503   WBC  6 25   < >  4 06*   HGB  17 4*   < >  13 0   HCT  47 3*   < >  36 0   PLT  231   < >  172   K  3 7   < >  3 0*   NA  131*   < >  141   CALCIUM  8 9   < >  7 7*   BUN  12   < >  3*   CREATININE  0 95   < >  0 50*   LIPASE  85   --    --    TROPONINI  <0 02   --    --    AST  13   --    --    ALT  35   --    --    ALKPHOS  85   --    --     < > = values in this interval not displayed         Scheduled Meds:  Current Facility-Administered Medications:  acetaminophen 650 mg Oral Q6H PRN Mansi Maynard PA-C    aspirin 81 mg Oral Daily Geovanna Lopes MD    atorvastatin 80 mg Oral Daily With Shelby Woods MD    butalbital-acetaminophen-caffeine 1 tablet Oral Q4H PRN Mansi Maynard PA-C ciprofloxacin 500 mg Oral Q12H Maci Verdugo MD    dicyclomine 20 mg Oral 4x Daily (AC & HS) Kenna Ferraro PA-C    enoxaparin 40 mg Subcutaneous Daily Gloria Rodriguez MD    fluticasone-vilanterol 1 puff Inhalation Daily Gloria Rodriguez MD    HYDROmorphone 0 5 mg Intravenous Q3H PRN Farrah Cai PA-C    insulin glargine 12 Units Subcutaneous HS Laurie Ferraro PA-C    insulin lispro 1-5 Units Subcutaneous TID AC Laurie Ferraro PA-C    insulin lispro 1-5 Units Subcutaneous HS Laurie Ferraro PA-C    losartan 25 mg Oral Daily Donny San MD    metroNIDAZOLE 500 mg Oral Q8H [address] Kai Taveras MD    montelukast 10 mg Oral Daily Gloria Rodriguez MD    potassium chloride 40 mEq Oral BID Laurie Ferraro PA-C    sodium chloride 100 mL/hr Intravenous Continuous Donny San MD Last Rate: 100 mL/hr (10/21/18 1711)     Continuous Infusions:  sodium chloride 100 mL/hr Last Rate: 100 mL/hr (10/21/18 1711)     PRN Meds:   acetaminophen    butalbital-acetaminophen-caffeine    HYDROmorphone    Surgical procedures (if appropriate):

## 2018-10-22 NOTE — ASSESSMENT & PLAN NOTE
Acute colitis - likely viral vs  Bacterial vs  Parasitic  GI input appreciated  Continue cipro/flagyl, probiotics  On full liquids  Monitor  She is slowly improving (no further vomiting, still with diarrhea)  ? Possible discharge to home tomorrow if continues with improvement  C diff is (-), stool studies are (-), await ova and parasites and Giardia  Also noted abnormal liver finding with liver lesion    Will need outpatient MRI of the liver to characterize the lesion/outpatient follow up with GI

## 2018-10-22 NOTE — ASSESSMENT & PLAN NOTE
Continue to hold metformin at least 48 hours from admission given IV contrast   Continue sliding scale, POCs, resumed home Lantus

## 2018-10-23 ENCOUNTER — APPOINTMENT (INPATIENT)
Dept: CT IMAGING | Facility: HOSPITAL | Age: 43
DRG: 392 | End: 2018-10-23
Payer: COMMERCIAL

## 2018-10-23 LAB
ANION GAP SERPL CALCULATED.3IONS-SCNC: 7 MMOL/L (ref 4–13)
BASOPHILS # BLD AUTO: 0.02 THOUSANDS/ΜL (ref 0–0.1)
BASOPHILS NFR BLD AUTO: 0 % (ref 0–1)
BUN SERPL-MCNC: 2 MG/DL (ref 5–25)
CALCIUM SERPL-MCNC: 8.4 MG/DL (ref 8.3–10.1)
CHLORIDE SERPL-SCNC: 106 MMOL/L (ref 100–108)
CO2 SERPL-SCNC: 25 MMOL/L (ref 21–32)
CREAT SERPL-MCNC: 0.55 MG/DL (ref 0.6–1.3)
EOSINOPHIL # BLD AUTO: 0.15 THOUSAND/ΜL (ref 0–0.61)
EOSINOPHIL NFR BLD AUTO: 3 % (ref 0–6)
ERYTHROCYTE [DISTWIDTH] IN BLOOD BY AUTOMATED COUNT: 11.6 % (ref 11.6–15.1)
GFR SERPL CREATININE-BSD FRML MDRD: 116 ML/MIN/1.73SQ M
GLUCOSE SERPL-MCNC: 152 MG/DL (ref 65–140)
GLUCOSE SERPL-MCNC: 169 MG/DL (ref 65–140)
GLUCOSE SERPL-MCNC: 172 MG/DL (ref 65–140)
GLUCOSE SERPL-MCNC: 213 MG/DL (ref 65–140)
GLUCOSE SERPL-MCNC: 248 MG/DL (ref 65–140)
HCT VFR BLD AUTO: 36 % (ref 34.8–46.1)
HGB BLD-MCNC: 13.1 G/DL (ref 11.5–15.4)
IMM GRANULOCYTES # BLD AUTO: 0.04 THOUSAND/UL (ref 0–0.2)
IMM GRANULOCYTES NFR BLD AUTO: 1 % (ref 0–2)
LYMPHOCYTES # BLD AUTO: 2.25 THOUSANDS/ΜL (ref 0.6–4.47)
LYMPHOCYTES NFR BLD AUTO: 41 % (ref 14–44)
MCH RBC QN AUTO: 33.4 PG (ref 26.8–34.3)
MCHC RBC AUTO-ENTMCNC: 36.4 G/DL (ref 31.4–37.4)
MCV RBC AUTO: 92 FL (ref 82–98)
MONOCYTES # BLD AUTO: 0.49 THOUSAND/ΜL (ref 0.17–1.22)
MONOCYTES NFR BLD AUTO: 9 % (ref 4–12)
NEUTROPHILS # BLD AUTO: 2.54 THOUSANDS/ΜL (ref 1.85–7.62)
NEUTS SEG NFR BLD AUTO: 46 % (ref 43–75)
NRBC BLD AUTO-RTO: 0 /100 WBCS
PLATELET # BLD AUTO: 171 THOUSANDS/UL (ref 149–390)
PMV BLD AUTO: 10.6 FL (ref 8.9–12.7)
POTASSIUM SERPL-SCNC: 3.6 MMOL/L (ref 3.5–5.3)
RBC # BLD AUTO: 3.92 MILLION/UL (ref 3.81–5.12)
SODIUM SERPL-SCNC: 138 MMOL/L (ref 136–145)
WBC # BLD AUTO: 5.49 THOUSAND/UL (ref 4.31–10.16)
WBC SPEC QL GRAM STN: NORMAL

## 2018-10-23 PROCEDURE — 99232 SBSQ HOSP IP/OBS MODERATE 35: CPT | Performed by: PHYSICIAN ASSISTANT

## 2018-10-23 PROCEDURE — 82948 REAGENT STRIP/BLOOD GLUCOSE: CPT

## 2018-10-23 PROCEDURE — 80048 BASIC METABOLIC PNL TOTAL CA: CPT | Performed by: PHYSICIAN ASSISTANT

## 2018-10-23 PROCEDURE — 85025 COMPLETE CBC W/AUTO DIFF WBC: CPT | Performed by: PHYSICIAN ASSISTANT

## 2018-10-23 PROCEDURE — 99232 SBSQ HOSP IP/OBS MODERATE 35: CPT | Performed by: INTERNAL MEDICINE

## 2018-10-23 PROCEDURE — 83993 ASSAY FOR CALPROTECTIN FECAL: CPT | Performed by: PHYSICIAN ASSISTANT

## 2018-10-23 RX ORDER — POLYETHYLENE GLYCOL 3350 17 G/17G
238 POWDER, FOR SOLUTION ORAL ONCE
Status: COMPLETED | OUTPATIENT
Start: 2018-10-23 | End: 2018-10-23

## 2018-10-23 RX ADMIN — DICYCLOMINE HYDROCHLORIDE 20 MG: 20 TABLET ORAL at 21:09

## 2018-10-23 RX ADMIN — INSULIN LISPRO 2 UNITS: 100 INJECTION, SOLUTION INTRAVENOUS; SUBCUTANEOUS at 16:19

## 2018-10-23 RX ADMIN — INSULIN LISPRO 2 UNITS: 100 INJECTION, SOLUTION INTRAVENOUS; SUBCUTANEOUS at 13:29

## 2018-10-23 RX ADMIN — FLUTICASONE FUROATE AND VILANTEROL TRIFENATATE 1 PUFF: 100; 25 POWDER RESPIRATORY (INHALATION) at 10:08

## 2018-10-23 RX ADMIN — HYDROMORPHONE HYDROCHLORIDE 0.5 MG: 1 INJECTION, SOLUTION INTRAMUSCULAR; INTRAVENOUS; SUBCUTANEOUS at 13:30

## 2018-10-23 RX ADMIN — METRONIDAZOLE 500 MG: 500 TABLET ORAL at 13:30

## 2018-10-23 RX ADMIN — POLYETHYLENE GLYCOL 3350 238 G: 17 POWDER, FOR SOLUTION ORAL at 21:09

## 2018-10-23 RX ADMIN — HYDROMORPHONE HYDROCHLORIDE 0.5 MG: 1 INJECTION, SOLUTION INTRAMUSCULAR; INTRAVENOUS; SUBCUTANEOUS at 10:10

## 2018-10-23 RX ADMIN — ENOXAPARIN SODIUM 40 MG: 40 INJECTION SUBCUTANEOUS at 10:06

## 2018-10-23 RX ADMIN — SODIUM CHLORIDE 100 ML/HR: 0.9 INJECTION, SOLUTION INTRAVENOUS at 10:10

## 2018-10-23 RX ADMIN — BISACODYL 10 MG: 5 TABLET, COATED ORAL at 18:15

## 2018-10-23 RX ADMIN — DICYCLOMINE HYDROCHLORIDE 20 MG: 20 TABLET ORAL at 13:29

## 2018-10-23 RX ADMIN — METRONIDAZOLE 500 MG: 500 TABLET ORAL at 21:09

## 2018-10-23 RX ADMIN — INSULIN GLARGINE 12 UNITS: 100 INJECTION, SOLUTION SUBCUTANEOUS at 22:10

## 2018-10-23 RX ADMIN — INSULIN LISPRO 1 UNITS: 100 INJECTION, SOLUTION INTRAVENOUS; SUBCUTANEOUS at 22:11

## 2018-10-23 RX ADMIN — HYDROMORPHONE HYDROCHLORIDE 0.5 MG: 1 INJECTION, SOLUTION INTRAMUSCULAR; INTRAVENOUS; SUBCUTANEOUS at 06:08

## 2018-10-23 RX ADMIN — METRONIDAZOLE 500 MG: 500 TABLET ORAL at 06:08

## 2018-10-23 RX ADMIN — DICYCLOMINE HYDROCHLORIDE 20 MG: 20 TABLET ORAL at 06:08

## 2018-10-23 RX ADMIN — CIPROFLOXACIN HYDROCHLORIDE 500 MG: 500 TABLET, FILM COATED ORAL at 10:07

## 2018-10-23 RX ADMIN — ASPIRIN 81 MG 81 MG: 81 TABLET ORAL at 10:07

## 2018-10-23 RX ADMIN — SODIUM CHLORIDE 100 ML/HR: 0.9 INJECTION, SOLUTION INTRAVENOUS at 22:40

## 2018-10-23 RX ADMIN — HYDROMORPHONE HYDROCHLORIDE 0.5 MG: 1 INJECTION, SOLUTION INTRAMUSCULAR; INTRAVENOUS; SUBCUTANEOUS at 21:08

## 2018-10-23 RX ADMIN — CIPROFLOXACIN HYDROCHLORIDE 500 MG: 500 TABLET, FILM COATED ORAL at 21:09

## 2018-10-23 RX ADMIN — ATORVASTATIN CALCIUM 80 MG: 40 TABLET, FILM COATED ORAL at 16:19

## 2018-10-23 RX ADMIN — MONTELUKAST SODIUM 10 MG: 10 TABLET, FILM COATED ORAL at 10:07

## 2018-10-23 NOTE — ASSESSMENT & PLAN NOTE
Admitted with acute enterocolitis -? Viral vs  Bacterial vs  Parasitic enterocolitis vs  IBD/crohn's  Her abdominal pain persists and she reports it is significant at times, mostly in the left lower quadrant now  Continue cipro/flagyl, probiotics  On full liquids  Plan is for colonoscopy today  Diarrhea and vomiting improved but still with significant pain  C diff is (-), stool studies are (-), await ova and parasites and Giardia  Her CRP is elevated  No family hx of IBD  Also noted abnormal liver finding with liver lesion    Will need outpatient MRI of the liver to characterize the lesion/outpatient follow up with GI

## 2018-10-23 NOTE — ASSESSMENT & PLAN NOTE
Admitted with acute enterocolitis - likely viral vs  Bacterial vs  Parasitic r/o IBD/crohn's/less likely  Her abdominal pain persists, and she reports it is significant  Continue cipro/flagyl, probiotics  On full liquids  Monitor  Diarrhea and vomiting improved but still with significant pain  C diff is (-), stool studies are (-), await ova and parasites and Giardia  Her CRP is elevated  No family hx of IBD  Will check CT enterography to further assess due to continued ongoing abdominal pain  Discussed with GI  Also noted abnormal liver finding with liver lesion    Will need outpatient MRI of the liver to characterize the lesion/outpatient follow up with GI

## 2018-10-23 NOTE — PROGRESS NOTES
GI Progress Note - Anca Woodall 44 y o  female MRN: [id number]    Unit/Bed#: -01 Encounter: [id number]    Subjective: Ms Wyatt Ramsay continues to have persistent abdominal pain though reports mild improvement  He diarrhea has improved  She is nauseous but denies any further vomiting since Saturday  She denies any hematochezia but the stool appears black at times  Objective:     Vitals: Blood pressure 108/68, pulse 66, temperature 98 2 °F (36 8 °C), temperature source Oral, resp  rate 18, height 5' 4" (1 626 m), weight 73 6 kg (162 lb 4 1 oz), last menstrual period 10/07/2018, SpO2 95 %  ,Body mass index is 27 85 kg/m²  Intake/Output Summary (Last 24 hours) at 10/23/18 1518  Last data filed at 10/23/18 1010   Gross per 24 hour   Intake              120 ml   Output                0 ml   Net              120 ml       Physical Exam:     General Appearance: Alert, oriented x3, no acute distress  Lungs: Clear to auscultation bilaterally, no respiratory distress  Heart: RRR, no murmur  Abdomen: Non-distended, soft, BS active,(+) generalized TTP  Extremities: No cyanosis or LE edema    Invasive Devices     Peripheral Intravenous Line            Peripheral IV 10/22/18 Left Arm less than 1 day                Lab Results:    Results from last 7 days  Lab Units 10/23/18  0441   WBC Thousand/uL 5 49   HEMOGLOBIN g/dL 13 1   HEMATOCRIT % 36 0   PLATELETS Thousands/uL 171   NEUTROS PCT % 46   LYMPHS PCT % 41   MONOS PCT % 9   EOS PCT % 3       Results from last 7 days  Lab Units 10/23/18  0441  10/19/18  2219   SODIUM mmol/L 138  < > 131*   POTASSIUM mmol/L 3 6  < > 3 7   CHLORIDE mmol/L 106  < > 94*   CO2 mmol/L 25  < > 23   BUN mg/dL 2*  < > 12   CREATININE mg/dL 0 55*  < > 0 95   CALCIUM mg/dL 8 4  < > 8 9   ALK PHOS U/L  --   --  85   ALT U/L  --   --  35   AST U/L  --   --  13   < > = values in this interval not displayed          Results from last 7 days  Lab Units 10/19/18  2219   LIPASE u/L 85       Imaging Studies: I have personally reviewed pertinent imaging studies  Ct Abdomen Pelvis With Contrast  Result Date: 10/19/2018  Impression: Abnormal appearance of portions of the large and small bowel, as described above  Please see above discussion and differential diagnosis  Gastrology consultation and follow-up is suggested  There is mild fatty infiltration of the liver  There are 2 lesions in the right lobe of the liver, measuring 2 3 x 1 7 cm and 1 8 x 1 cm, respectively  These may represent hemangiomas  Nonemergent outpatient follow-up is suggested  Assessment and Plan:     Acute Enterocolitis  - CT A/P on admission showed abnormal appearance of large and small bowel concerning for infectious v inflammatory condition given the diffuse involvement  - Cdiff, stool culture negative; giardia and O+P pending  - Fecal leukocytes pending, add fecal calprotectin  - CRP significantly elevated at 64 2, ESR normal  - She has experienced minimal improvement with decrease in diarrhea but her pain is persistent  - Discussed with [facility], plan for CT small bowel enterography to evaluate for any abnormal appearing small bowel segments/Crohn's disease  - Currently tolerating full liquid diet  - She remains afebrile and without leukocytosis  - Continue cipro/flagyl and complete 7 day course total  - Bentyl 20 mg QID  - She will need a colonoscopy either as outpatient v inpatient if she doesn't start to improve    Hemangioma  - CT on admission showed 2 hypodense lesions in the R lobe of the liver which appear to be hemangiomas  - She will need an MRI as outpatient for further evaluation      The patient will be seen by Dr Derrick Gross

## 2018-10-23 NOTE — ASSESSMENT & PLAN NOTE
Diffuse at 1st, now seems to be left lower quadrant, persisting  Continue Bentyl and as-needed pain medication

## 2018-10-23 NOTE — PROGRESS NOTES
Progress Note - Castillo Morgan 1975, 44 y o  female MRN: [id number]    Unit/Bed#: -01 Encounter: [id number]    Primary Care Provider: Kai Wagenr PA-C   Date and time admitted to hospital: 10/19/2018  9:31 PM        * Diarrhea   Assessment & Plan    Admitted with acute enterocolitis -? Viral vs  Bacterial vs  Parasitic enterocolitis vs  IBD/crohn's  Her abdominal pain persists and she reports it is significant  Continue cipro/flagyl, probiotics  On full liquids  Monitor  Diarrhea and vomiting improved but still with significant pain  C diff is (-), stool studies are (-), await ova and parasites and Giardia  Her CRP is elevated  No family hx of IBD  Discussed with GI- they will talk to patient about potential colonoscopy due to persisting abdominal pain to r/o IBD  Also noted abnormal liver finding with liver lesion  Will need outpatient MRI of the liver to characterize the lesion/outpatient follow up with GI  Type 2 diabetes mellitus with hyperglycemia, with long-term current use of insulin (HCC)   Assessment & Plan    Metformin on hold  Continue sliding scale, POCs, Lantus  Gastroenteritis, acute   Assessment & Plan    Treatment/plan as above  Hepatic lesion   Assessment & Plan    Seen on CT a/p - outpatient MRI liver protocol  Abdominal pain   Assessment & Plan    Diffuse, persisting  Continue Bentyl an as-needed pain medication  VTE Pharmacologic Prophylaxis:   Pharmacologic: Enoxaparin (Lovenox)  Mechanical VTE Prophylaxis in Place: Yes    Patient Centered Rounds: I have performed bedside rounds with nursing staff today  Discussions with Specialists or Other Care Team Provider: Discussed with GI  Education and Discussions with Family / Patient: Discussed plan of care with patient and family at bedside  Time Spent for Care: 30 minutes  More than 50% of total time spent on counseling and coordination of care as described above      Current Length of Stay: 2 day(s)    Current Patient Status: Inpatient   Certification Statement: The patient will continue to require additional inpatient hospital stay due to managment of above conditions  Plan for CT enterography  Discharge Plan: Awaiting further testing  Code Status: Level 1 - Full Code      Subjective:   Patient reports continued abdominal pain  Family reports she was crying due to the pain  Diarrhea and vomiting improved  Some nausea  Objective:     Vitals:   Temp (24hrs), Av 1 °F (36 7 °C), Min:98 1 °F (36 7 °C), Max:98 2 °F (36 8 °C)    Temp:  [98 1 °F (36 7 °C)-98 2 °F (36 8 °C)] 98 1 °F (36 7 °C)  HR:  [64-67] 67  Resp:  [18] 18  BP: (102-110)/(58-68) 102/58  SpO2:  [94 %-96 %] 94 %  Body mass index is 27 85 kg/m²  Input and Output Summary (last 24 hours): Intake/Output Summary (Last 24 hours) at 10/23/18 1655  Last data filed at 10/23/18 1010   Gross per 24 hour   Intake              120 ml   Output                0 ml   Net              120 ml       Physical Exam:     Physical Exam   Constitutional: She is oriented to person, place, and time  She appears well-developed and well-nourished  No distress  HENT:   Head: Normocephalic and atraumatic  Eyes: No scleral icterus  Neck: Neck supple  Cardiovascular: Normal rate and regular rhythm  No murmur heard  Pulmonary/Chest: Effort normal and breath sounds normal  No respiratory distress  She has no wheezes  She has no rales  Abdominal: Soft  Bowel sounds are normal  There is tenderness  Generalized TTP > in lower quadrants  Musculoskeletal: She exhibits no edema  Neurological: She is alert and oriented to person, place, and time  Skin: Skin is warm and dry  Psychiatric: She has a normal mood and affect  Vitals reviewed           Additional Data:     Labs:      Results from last 7 days  Lab Units 10/23/18  0441   WBC Thousand/uL 5 49   HEMOGLOBIN g/dL 13 1   HEMATOCRIT % 36 0   PLATELETS Thousands/uL 171   NEUTROS PCT % 46   LYMPHS PCT % 41   MONOS PCT % 9   EOS PCT % 3       Results from last 7 days  Lab Units 10/23/18  0441  10/19/18  2219   SODIUM mmol/L 138  < > 131*   POTASSIUM mmol/L 3 6  < > 3 7   CHLORIDE mmol/L 106  < > 94*   CO2 mmol/L 25  < > 23   BUN mg/dL 2*  < > 12   CREATININE mg/dL 0 55*  < > 0 95   CALCIUM mg/dL 8 4  < > 8 9   ALK PHOS U/L  --   --  85   ALT U/L  --   --  35   AST U/L  --   --  13   < > = values in this interval not displayed  * I Have Reviewed All Lab Data Listed Above  * Additional Pertinent Lab Tests Reviewed: All Labs Within Last 24 Hours Reviewed    Imaging:    Imaging Reports Reviewed Today Include: No new imaging today  Recent Cultures (last 7 days):       Results from last 7 days  Lab Units 10/20/18  0029   C DIFF TOXIN B  NEGATIVE for C difficle toxin by PCR          Last 24 Hours Medication List:     Current Facility-Administered Medications:  acetaminophen 650 mg Oral Q6H PRN Liam Duran PA-C    aspirin 81 mg Oral Daily Keila Roy MD    atorvastatin 80 mg Oral Daily With Adolfo Pool MD    butalbital-acetaminophen-caffeine 1 tablet Oral Q4H PRN Liam Duran PA-C    ciprofloxacin 500 mg Oral Q12H Hilary Russell MD    dicyclomine 20 mg Oral 4x Daily (AC & HS) Paras Ferraro PA-C    enoxaparin 40 mg Subcutaneous Daily Keila Roy MD    fluticasone-vilanterol 1 puff Inhalation Daily Keila Roy MD    HYDROmorphone 0 5 mg Intravenous Q3H PRN Liam Duran PA-C    insulin glargine 12 Units Subcutaneous HS Laurie Ferraro PA-C    insulin lispro 1-5 Units Subcutaneous TID AC Laurie Ferraro PA-C    insulin lispro 1-5 Units Subcutaneous HS Laurie Ferraro PA-C    losartan 25 mg Oral Daily Keila Roy MD    metroNIDAZOLE 500 mg Oral Q8H Hilary Russell MD    montelukast 10 mg Oral Daily Keila Roy MD    sodium chloride 100 mL/hr Intravenous Continuous Keila Roy MD Last Rate: 100 mL/hr (10/23/18 1010) Today, Patient Was Seen By: Liana Ferreira PA-C    ** Please Note: Dictation voice to text software may have been used in the creation of this document   **

## 2018-10-24 ENCOUNTER — ANESTHESIA (INPATIENT)
Dept: PERIOP | Facility: HOSPITAL | Age: 43
DRG: 392 | End: 2018-10-24
Payer: COMMERCIAL

## 2018-10-24 ENCOUNTER — ANESTHESIA EVENT (INPATIENT)
Dept: PERIOP | Facility: HOSPITAL | Age: 43
DRG: 392 | End: 2018-10-24
Payer: COMMERCIAL

## 2018-10-24 LAB
ANION GAP SERPL CALCULATED.3IONS-SCNC: 9 MMOL/L (ref 4–13)
BASOPHILS # BLD AUTO: 0.03 THOUSANDS/ΜL (ref 0–0.1)
BASOPHILS NFR BLD AUTO: 1 % (ref 0–1)
BUN SERPL-MCNC: 3 MG/DL (ref 5–25)
CALCIUM SERPL-MCNC: 8.6 MG/DL (ref 8.3–10.1)
CHLORIDE SERPL-SCNC: 108 MMOL/L (ref 100–108)
CO2 SERPL-SCNC: 26 MMOL/L (ref 21–32)
CREAT SERPL-MCNC: 0.57 MG/DL (ref 0.6–1.3)
EOSINOPHIL # BLD AUTO: 0.15 THOUSAND/ΜL (ref 0–0.61)
EOSINOPHIL NFR BLD AUTO: 3 % (ref 0–6)
ERYTHROCYTE [DISTWIDTH] IN BLOOD BY AUTOMATED COUNT: 11.5 % (ref 11.6–15.1)
GFR SERPL CREATININE-BSD FRML MDRD: 115 ML/MIN/1.73SQ M
GLUCOSE SERPL-MCNC: 112 MG/DL (ref 65–140)
GLUCOSE SERPL-MCNC: 125 MG/DL (ref 65–140)
GLUCOSE SERPL-MCNC: 150 MG/DL (ref 65–140)
GLUCOSE SERPL-MCNC: 172 MG/DL (ref 65–140)
GLUCOSE SERPL-MCNC: 234 MG/DL (ref 65–140)
HCT VFR BLD AUTO: 35.6 % (ref 34.8–46.1)
HGB BLD-MCNC: 12.9 G/DL (ref 11.5–15.4)
IMM GRANULOCYTES # BLD AUTO: 0.03 THOUSAND/UL (ref 0–0.2)
IMM GRANULOCYTES NFR BLD AUTO: 1 % (ref 0–2)
LYMPHOCYTES # BLD AUTO: 2.02 THOUSANDS/ΜL (ref 0.6–4.47)
LYMPHOCYTES NFR BLD AUTO: 35 % (ref 14–44)
MCH RBC QN AUTO: 33.4 PG (ref 26.8–34.3)
MCHC RBC AUTO-ENTMCNC: 36.2 G/DL (ref 31.4–37.4)
MCV RBC AUTO: 92 FL (ref 82–98)
MONOCYTES # BLD AUTO: 0.49 THOUSAND/ΜL (ref 0.17–1.22)
MONOCYTES NFR BLD AUTO: 9 % (ref 4–12)
NEUTROPHILS # BLD AUTO: 2.98 THOUSANDS/ΜL (ref 1.85–7.62)
NEUTS SEG NFR BLD AUTO: 51 % (ref 43–75)
NRBC BLD AUTO-RTO: 0 /100 WBCS
PLATELET # BLD AUTO: 184 THOUSANDS/UL (ref 149–390)
PMV BLD AUTO: 11 FL (ref 8.9–12.7)
POTASSIUM SERPL-SCNC: 3.6 MMOL/L (ref 3.5–5.3)
RBC # BLD AUTO: 3.86 MILLION/UL (ref 3.81–5.12)
SODIUM SERPL-SCNC: 143 MMOL/L (ref 136–145)
WBC # BLD AUTO: 5.7 THOUSAND/UL (ref 4.31–10.16)

## 2018-10-24 PROCEDURE — 85025 COMPLETE CBC W/AUTO DIFF WBC: CPT | Performed by: PHYSICIAN ASSISTANT

## 2018-10-24 PROCEDURE — 0DBE8ZX EXCISION OF LARGE INTESTINE, VIA NATURAL OR ARTIFICIAL OPENING ENDOSCOPIC, DIAGNOSTIC: ICD-10-PCS | Performed by: INTERNAL MEDICINE

## 2018-10-24 PROCEDURE — 45380 COLONOSCOPY AND BIOPSY: CPT | Performed by: INTERNAL MEDICINE

## 2018-10-24 PROCEDURE — 99232 SBSQ HOSP IP/OBS MODERATE 35: CPT | Performed by: PHYSICIAN ASSISTANT

## 2018-10-24 PROCEDURE — 88305 TISSUE EXAM BY PATHOLOGIST: CPT | Performed by: PATHOLOGY

## 2018-10-24 PROCEDURE — 82948 REAGENT STRIP/BLOOD GLUCOSE: CPT

## 2018-10-24 PROCEDURE — 0DBH8ZX EXCISION OF CECUM, VIA NATURAL OR ARTIFICIAL OPENING ENDOSCOPIC, DIAGNOSTIC: ICD-10-PCS | Performed by: INTERNAL MEDICINE

## 2018-10-24 PROCEDURE — 80048 BASIC METABOLIC PNL TOTAL CA: CPT | Performed by: PHYSICIAN ASSISTANT

## 2018-10-24 PROCEDURE — 45385 COLONOSCOPY W/LESION REMOVAL: CPT | Performed by: INTERNAL MEDICINE

## 2018-10-24 RX ORDER — SODIUM CHLORIDE, SODIUM LACTATE, POTASSIUM CHLORIDE, CALCIUM CHLORIDE 600; 310; 30; 20 MG/100ML; MG/100ML; MG/100ML; MG/100ML
INJECTION, SOLUTION INTRAVENOUS CONTINUOUS PRN
Status: DISCONTINUED | OUTPATIENT
Start: 2018-10-24 | End: 2018-10-24 | Stop reason: SURG

## 2018-10-24 RX ORDER — LIDOCAINE HYDROCHLORIDE 10 MG/ML
INJECTION, SOLUTION EPIDURAL; INFILTRATION; INTRACAUDAL; PERINEURAL AS NEEDED
Status: DISCONTINUED | OUTPATIENT
Start: 2018-10-24 | End: 2018-10-24 | Stop reason: SURG

## 2018-10-24 RX ORDER — PROPOFOL 10 MG/ML
INJECTION, EMULSION INTRAVENOUS AS NEEDED
Status: DISCONTINUED | OUTPATIENT
Start: 2018-10-24 | End: 2018-10-24 | Stop reason: SURG

## 2018-10-24 RX ADMIN — INSULIN LISPRO 2 UNITS: 100 INJECTION, SOLUTION INTRAVENOUS; SUBCUTANEOUS at 23:14

## 2018-10-24 RX ADMIN — DICYCLOMINE HYDROCHLORIDE 20 MG: 20 TABLET ORAL at 06:08

## 2018-10-24 RX ADMIN — PROPOFOL 30 MG: 10 INJECTION, EMULSION INTRAVENOUS at 15:13

## 2018-10-24 RX ADMIN — CIPROFLOXACIN HYDROCHLORIDE 500 MG: 500 TABLET, FILM COATED ORAL at 08:57

## 2018-10-24 RX ADMIN — SODIUM CHLORIDE 100 ML/HR: 0.9 INJECTION, SOLUTION INTRAVENOUS at 08:58

## 2018-10-24 RX ADMIN — CIPROFLOXACIN HYDROCHLORIDE 500 MG: 500 TABLET, FILM COATED ORAL at 22:39

## 2018-10-24 RX ADMIN — SODIUM CHLORIDE 100 ML/HR: 0.9 INJECTION, SOLUTION INTRAVENOUS at 22:44

## 2018-10-24 RX ADMIN — HYDROMORPHONE HYDROCHLORIDE 0.5 MG: 1 INJECTION, SOLUTION INTRAMUSCULAR; INTRAVENOUS; SUBCUTANEOUS at 19:39

## 2018-10-24 RX ADMIN — SODIUM CHLORIDE, SODIUM LACTATE, POTASSIUM CHLORIDE, AND CALCIUM CHLORIDE: .6; .31; .03; .02 INJECTION, SOLUTION INTRAVENOUS at 14:50

## 2018-10-24 RX ADMIN — METRONIDAZOLE 500 MG: 500 TABLET ORAL at 17:56

## 2018-10-24 RX ADMIN — PROPOFOL 20 MG: 10 INJECTION, EMULSION INTRAVENOUS at 15:22

## 2018-10-24 RX ADMIN — PROPOFOL 100 MG: 10 INJECTION, EMULSION INTRAVENOUS at 15:07

## 2018-10-24 RX ADMIN — ATORVASTATIN CALCIUM 80 MG: 40 TABLET, FILM COATED ORAL at 17:56

## 2018-10-24 RX ADMIN — METRONIDAZOLE 500 MG: 500 TABLET ORAL at 06:08

## 2018-10-24 RX ADMIN — LIDOCAINE HYDROCHLORIDE 50 MG: 10 INJECTION, SOLUTION EPIDURAL; INFILTRATION; INTRACAUDAL; PERINEURAL at 15:07

## 2018-10-24 RX ADMIN — PROPOFOL 20 MG: 10 INJECTION, EMULSION INTRAVENOUS at 15:18

## 2018-10-24 RX ADMIN — LOSARTAN POTASSIUM 25 MG: 25 TABLET, FILM COATED ORAL at 08:57

## 2018-10-24 RX ADMIN — DICYCLOMINE HYDROCHLORIDE 20 MG: 20 TABLET ORAL at 22:39

## 2018-10-24 RX ADMIN — PROPOFOL 20 MG: 10 INJECTION, EMULSION INTRAVENOUS at 15:08

## 2018-10-24 RX ADMIN — INSULIN GLARGINE 12 UNITS: 100 INJECTION, SOLUTION SUBCUTANEOUS at 22:39

## 2018-10-24 RX ADMIN — HYDROMORPHONE HYDROCHLORIDE 0.5 MG: 1 INJECTION, SOLUTION INTRAMUSCULAR; INTRAVENOUS; SUBCUTANEOUS at 23:13

## 2018-10-24 RX ADMIN — DICYCLOMINE HYDROCHLORIDE 20 MG: 20 TABLET ORAL at 17:56

## 2018-10-24 RX ADMIN — HYDROMORPHONE HYDROCHLORIDE 0.5 MG: 1 INJECTION, SOLUTION INTRAMUSCULAR; INTRAVENOUS; SUBCUTANEOUS at 03:45

## 2018-10-24 NOTE — OP NOTE
OPERATIVE REPORT  PATIENT NAME: Castillo Morgan    :  1975  MRN: [id number]  Pt Location: [facility] 02    SURGERY DATE: 10/24/2018    Surgeon(s) and Role:     * Mandeep Amaya MD - Primary    Preop Diagnosis:  Enteritis [K52 9]  Diarrhea [R19 7]  Abdominal pain [R10 9]    Post-Op Diagnosis Codes:     * Enteritis [K52 9]     * Diarrhea [R19 7]     * Abdominal pain [R10 9]    Procedure(s) (LRB):  COLONOSCOPY (N/A)    Specimen(s):  ID Type Source Tests Collected by Time Destination   1 : cecum Tissue Polyp, Colorectal TISSUE EXAM Mandeep Amaya MD 10/24/2018 1518    2 : random colon Tissue Large Intestine, NOS TISSUE EXAM Mandeep Amaya MD 10/24/2018 1525    Colonoscopy Procedure Note    Procedure: Colonoscopy    Sedation: Monitored anesthesia care, check anesthesia records      ASA Class: 2    INDICATIONS: abdominal pain/colitis    POST-OP DIAGNOSIS: See the impression below    Procedure Details     Prior colonoscopy: More than 10 years ago  Informed consent was obtained for the procedure, including sedation  Risks of perforation, hemorrhage, adverse drug reaction and aspiration were discussed  The patient was placed in the left lateral decubitus position  Based on the pre-procedure assessment, including review of the patient's medical history, medications, allergies, and review of systems, she had been deemed to be an appropriate candidate for conscious sedation; she was therefore sedated with the medications listed below  The patient was monitored continuously with telemetry, pulse oximetry, blood pressure monitoring, and direct observations  A rectal examination was performed  The colonoscope was inserted into the rectum and advanced under direct vision to the terminal ileum  The quality of the colonic preparation was fair  A careful inspection was made as the colonoscope was withdrawn, including a retroflexed view of the rectum; findings and interventions are described below      Findings:  15 mm polyp seen in the cecum removed by snare cautery polypectomy  Clip placed at the site of polypectomy to prevent post polypectomy bleeding  Random biopsies taken throughout the colon to rule out active inflammation  Mild erythema seen throughout the colon consistent with recent episode of colitis  Normal terminal ileum           Complications: None; patient tolerated the procedure well  Impression:    Polyp removed from the cecum successfully  Clip placed at the site of polypectomy  Mild erythema likely secondary to recent colitis  Normal TI    Recommendations:  Repeat colonoscopy in 1 years or sooner if clinically indicated due to polyp that was removed and fair preparation      SIGNATURE: Jerald Mccoy MD  DATE: October 24, 2018  TIME: 3:32 PM

## 2018-10-24 NOTE — ANESTHESIA PREPROCEDURE EVALUATION
Review of Systems/Medical History    Chart reviewed      Cardiovascular  Hyperlipidemia,    Pulmonary  Asthma , PRN med  controlled Last rescue: < 1 month ago Asthma type of rescue: PRN inhaler,        GI/Hepatic      Comment: Diarrhea since last Monday  Abnormal CT Abd thickening of bowel and right lobe hepatic lesion     Negative  ROS        Endo/Other  Diabetes poorly controlled Insulin,      GYN       Hematology   Musculoskeletal  Negative musculoskeletal ROS        Neurology  Negative neurology ROS      Psychology   Negative psychology ROS              Physical Exam    Airway    Mallampati score: II  TM Distance: >3 FB  Neck ROM: full     Dental       Cardiovascular  Rhythm: regular, Rate: normal,     Pulmonary      Other Findings        Anesthesia Plan  ASA Score- 2     Anesthesia Type- IV sedation with anesthesia with ASA Monitors  Additional Monitors:   Airway Plan:         Plan Factors-    Induction- intravenous  Postoperative Plan-     Informed Consent- Anesthetic plan and risks discussed with patient  I personally reviewed this patient with the CRNA  Discussed and agreed on the Anesthesia Plan with the CRNA  Kevon Cohen

## 2018-10-24 NOTE — ANESTHESIA POSTPROCEDURE EVALUATION
Post-Op Assessment Note      CV Status:  Stable    Mental Status:  Alert and awake    Hydration Status:  Stable    PONV Controlled:  None    Airway Patency:  Patent and adequate    Post Op Vitals Reviewed: Yes          Staff: Anesthesiologist, CRNA           BP   102/70   Temp      Pulse  80   Resp  18   SpO2   99%

## 2018-10-24 NOTE — PROGRESS NOTES
Progress Note - Shonda Santamariaigham 1975, 44 y o  female MRN: [id number]    Unit/Bed#: -01 Encounter: [id number]    Primary Care Provider: Mohini Waller PA-C   Date and time admitted to hospital: 10/19/2018  9:31 PM    * Diarrhea   Assessment & Plan    Admitted with acute enterocolitis -? Viral vs  Bacterial vs  Parasitic enterocolitis vs  IBD/crohn's  Her abdominal pain persists and she reports it is significant at times, mostly in the left lower quadrant now  Continue cipro/flagyl, probiotics  On full liquids  Plan is for colonoscopy today  Diarrhea and vomiting improved but still with significant pain  C diff is (-), stool studies are (-), await ova and parasites and Giardia  Her CRP is elevated  No family hx of IBD  Also noted abnormal liver finding with liver lesion  Will need outpatient MRI of the liver to characterize the lesion/outpatient follow up with GI  Abdominal pain   Assessment & Plan    Diffuse at 1st, now seems to be left lower quadrant, persisting  Continue Bentyl and as-needed pain medication  Hepatic lesion   Assessment & Plan    Seen on CT a/p - outpatient MRI liver protocol  Type 2 diabetes mellitus with hyperglycemia, with long-term current use of insulin (HCC)   Assessment & Plan    Metformin on hold  Continue sliding scale, POCs, Lantus  Generally controlled at present  VTE Pharmacologic Prophylaxis:   Pharmacologic: Enoxaparin (Lovenox)  Mechanical VTE Prophylaxis in Place: Yes    Patient Centered Rounds: I have performed bedside rounds with nursing staff today  Discussions with Specialists or Other Care Team Provider: GI - scope today     Education and Discussions with Family / Patient: patient, no family present at this time    Time Spent for Care: 20 minutes  More than 50% of total time spent on counseling and coordination of care as described above      Current Length of Stay: 3 day(s)    Current Patient Status: Inpatient   Certification Statement: The patient will continue to require additional inpatient hospital stay due to cscope today for further eval of GI symptoms    Discharge Plan: pending scope and pain control    Code Status: Level 1 - Full Code      Subjective:   Patient seen, sleeping but easily woken  She continues to have left lower quadrant pain  Previously was diffuse  No nausea or vomiting  She is passing more solid/soft stools  She is urinating normally  She reports that she will be going for colonoscopy at the time of my evaluation this AM, later on today  Objective:     Vitals:   Temp (24hrs), Av 2 °F (36 8 °C), Min:98 °F (36 7 °C), Max:98 4 °F (36 9 °C)    Temp:  [98 °F (36 7 °C)-98 4 °F (36 9 °C)] 98 4 °F (36 9 °C)  HR:  [64-67] 65  Resp:  [18] 18  BP: (102-123)/(58-79) 112/74  SpO2:  [94 %-97 %] 96 %  Body mass index is 27 85 kg/m²  Input and Output Summary (last 24 hours): Intake/Output Summary (Last 24 hours) at 10/24/18 1407  Last data filed at 10/24/18 0345   Gross per 24 hour   Intake          3583 33 ml   Output                0 ml   Net          3583 33 ml       Physical Exam:     Physical Exam   Constitutional: She appears well-developed and well-nourished  She is sleeping  She is easily aroused  No distress  Cardiovascular: Normal rate, regular rhythm, S1 normal, S2 normal and normal heart sounds  No murmur heard  Pulmonary/Chest: Effort normal and breath sounds normal  No respiratory distress  She has no wheezes  Abdominal: Soft  Bowel sounds are normal  She exhibits no distension  There is tenderness in the left lower quadrant  Musculoskeletal: She exhibits no edema  Neurological: She is easily aroused  Nursing note and vitals reviewed        Additional Data:     Labs:      Results from last 7 days  Lab Units 10/24/18  0448   WBC Thousand/uL 5 70   HEMOGLOBIN g/dL 12 9   HEMATOCRIT % 35 6   PLATELETS Thousands/uL 184   NEUTROS PCT % 51   LYMPHS PCT % 35   MONOS PCT % 9   EOS PCT % 3       Results from last 7 days  Lab Units 10/24/18  0448  10/19/18  2219   SODIUM mmol/L 143  < > 131*   POTASSIUM mmol/L 3 6  < > 3 7   CHLORIDE mmol/L 108  < > 94*   CO2 mmol/L 26  < > 23   BUN mg/dL 3*  < > 12   CREATININE mg/dL 0 57*  < > 0 95   ANION GAP mmol/L 9  < > 14*   CALCIUM mg/dL 8 6  < > 8 9   ALBUMIN g/dL  --   --  3 8   TOTAL BILIRUBIN mg/dL  --   --  3 50*   ALK PHOS U/L  --   --  85   ALT U/L  --   --  35   AST U/L  --   --  13   < > = values in this interval not displayed  Results from last 7 days  Lab Units 10/24/18  1134 10/24/18  0537 10/23/18  2134 10/23/18  1556 10/23/18  1101 10/23/18  0611 10/22/18  2113 10/22/18  1554 10/22/18  1134 10/22/18  0605 10/21/18  2140 10/21/18  1622   POC GLUCOSE mg/dl 112 150* 172* 248* 213* 152* 281* 221* 260* 274* 165* 249*           Results from last 7 days  Lab Units 10/20/18  1549   LACTIC ACID mmol/L 0 9           * I Have Reviewed All Lab Data Listed Above  * Additional Pertinent Lab Tests Reviewed: All Labs Within Last 24 Hours Reviewed    Imaging:    Imaging Reports Reviewed Today Include: none new  Imaging Personally Reviewed by Myself Includes:  none    Recent Cultures (last 7 days):       Results from last 7 days  Lab Units 10/20/18  0029   C DIFF TOXIN B  NEGATIVE for C difficle toxin by PCR          Last 24 Hours Medication List:     Current Facility-Administered Medications:  acetaminophen 650 mg Oral Q6H PRN Leo Calvin PA-C    aspirin 81 mg Oral Daily Essie Rizvi MD    atorvastatin 80 mg Oral Daily With Chaitanya Pemberton MD    butalbital-acetaminophen-caffeine 1 tablet Oral Q4H PRN eLo Calvin PA-C    ciprofloxacin 500 mg Oral Q12H Varsha Lu MD    dicyclomine 20 mg Oral 4x Daily (AC & HS) Laurie Ferraro PA-C    enoxaparin 40 mg Subcutaneous Daily Essie Rizvi MD    fluticasone-vilanterol 1 puff Inhalation Daily Essie Rizvi MD    HYDROmorphone 0 5 mg Intravenous Q3H PRN Leo Calvin PA-C insulin glargine 12 Units Subcutaneous HS Laurie Ferraro PA-C    insulin lispro 1-5 Units Subcutaneous TID AC Laurie Ferraro PA-C    insulin lispro 1-5 Units Subcutaneous HS Laurie Ferraro PA-C    losartan 25 mg Oral Daily Jesús Tony MD    metroNIDAZOLE 500 mg Oral Q8H Andrzej Goldstein MD    montelukast 10 mg Oral Daily Jesús Tony MD    sodium chloride 100 mL/hr Intravenous Continuous Jesús Tony MD Last Rate: 100 mL/hr (10/24/18 0858)        Today, Patient Was Seen By: Nereyda Bell PA-C    ** Please Note: Dictation voice to text software may have been used in the creation of this document   **

## 2018-10-25 LAB
G LAMBLIA AG STL QL IA: NEGATIVE
GLUCOSE SERPL-MCNC: 242 MG/DL (ref 65–140)
GLUCOSE SERPL-MCNC: 249 MG/DL (ref 65–140)
GLUCOSE SERPL-MCNC: 255 MG/DL (ref 65–140)
GLUCOSE SERPL-MCNC: 261 MG/DL (ref 65–140)
O+P STL CONC: NORMAL

## 2018-10-25 PROCEDURE — 82948 REAGENT STRIP/BLOOD GLUCOSE: CPT

## 2018-10-25 PROCEDURE — 99232 SBSQ HOSP IP/OBS MODERATE 35: CPT | Performed by: PHYSICIAN ASSISTANT

## 2018-10-25 PROCEDURE — 99232 SBSQ HOSP IP/OBS MODERATE 35: CPT | Performed by: INTERNAL MEDICINE

## 2018-10-25 RX ORDER — HYDROMORPHONE HCL/PF 1 MG/ML
0.5 SYRINGE (ML) INJECTION EVERY 4 HOURS PRN
Status: DISCONTINUED | OUTPATIENT
Start: 2018-10-25 | End: 2018-10-26

## 2018-10-25 RX ORDER — INSULIN GLARGINE 100 [IU]/ML
14 INJECTION, SOLUTION SUBCUTANEOUS
Status: DISCONTINUED | OUTPATIENT
Start: 2018-10-25 | End: 2018-10-27 | Stop reason: HOSPADM

## 2018-10-25 RX ORDER — OXYCODONE HYDROCHLORIDE 5 MG/1
5 TABLET ORAL EVERY 4 HOURS PRN
Status: DISCONTINUED | OUTPATIENT
Start: 2018-10-25 | End: 2018-10-27 | Stop reason: HOSPADM

## 2018-10-25 RX ADMIN — INSULIN LISPRO 2 UNITS: 100 INJECTION, SOLUTION INTRAVENOUS; SUBCUTANEOUS at 22:23

## 2018-10-25 RX ADMIN — INSULIN LISPRO 2 UNITS: 100 INJECTION, SOLUTION INTRAVENOUS; SUBCUTANEOUS at 11:43

## 2018-10-25 RX ADMIN — METRONIDAZOLE 500 MG: 500 TABLET ORAL at 02:13

## 2018-10-25 RX ADMIN — HYDROMORPHONE HYDROCHLORIDE 0.5 MG: 1 INJECTION, SOLUTION INTRAMUSCULAR; INTRAVENOUS; SUBCUTANEOUS at 05:53

## 2018-10-25 RX ADMIN — OXYCODONE HYDROCHLORIDE 5 MG: 5 TABLET ORAL at 10:10

## 2018-10-25 RX ADMIN — DICYCLOMINE HYDROCHLORIDE 20 MG: 20 TABLET ORAL at 05:53

## 2018-10-25 RX ADMIN — HYDROMORPHONE HYDROCHLORIDE 0.5 MG: 1 INJECTION, SOLUTION INTRAMUSCULAR; INTRAVENOUS; SUBCUTANEOUS at 02:15

## 2018-10-25 RX ADMIN — SODIUM CHLORIDE 100 ML/HR: 0.9 INJECTION, SOLUTION INTRAVENOUS at 17:48

## 2018-10-25 RX ADMIN — OXYCODONE HYDROCHLORIDE 5 MG: 5 TABLET ORAL at 16:56

## 2018-10-25 RX ADMIN — ENOXAPARIN SODIUM 40 MG: 40 INJECTION SUBCUTANEOUS at 08:41

## 2018-10-25 RX ADMIN — HYDROMORPHONE HYDROCHLORIDE 0.5 MG: 1 INJECTION, SOLUTION INTRAMUSCULAR; INTRAVENOUS; SUBCUTANEOUS at 11:43

## 2018-10-25 RX ADMIN — ATORVASTATIN CALCIUM 80 MG: 40 TABLET, FILM COATED ORAL at 16:52

## 2018-10-25 RX ADMIN — DICYCLOMINE HYDROCHLORIDE 20 MG: 20 TABLET ORAL at 22:08

## 2018-10-25 RX ADMIN — DICYCLOMINE HYDROCHLORIDE 20 MG: 20 TABLET ORAL at 16:52

## 2018-10-25 RX ADMIN — ASPIRIN 81 MG 81 MG: 81 TABLET ORAL at 08:42

## 2018-10-25 RX ADMIN — HYDROMORPHONE HYDROCHLORIDE 0.5 MG: 1 INJECTION, SOLUTION INTRAMUSCULAR; INTRAVENOUS; SUBCUTANEOUS at 20:03

## 2018-10-25 RX ADMIN — MONTELUKAST SODIUM 10 MG: 10 TABLET, FILM COATED ORAL at 08:42

## 2018-10-25 RX ADMIN — INSULIN LISPRO 2 UNITS: 100 INJECTION, SOLUTION INTRAVENOUS; SUBCUTANEOUS at 16:52

## 2018-10-25 RX ADMIN — CIPROFLOXACIN HYDROCHLORIDE 500 MG: 500 TABLET, FILM COATED ORAL at 22:08

## 2018-10-25 RX ADMIN — INSULIN GLARGINE 14 UNITS: 100 INJECTION, SOLUTION SUBCUTANEOUS at 22:22

## 2018-10-25 RX ADMIN — SODIUM CHLORIDE 100 ML/HR: 0.9 INJECTION, SOLUTION INTRAVENOUS at 08:41

## 2018-10-25 RX ADMIN — OXYCODONE HYDROCHLORIDE 5 MG: 5 TABLET ORAL at 22:08

## 2018-10-25 RX ADMIN — DICYCLOMINE HYDROCHLORIDE 20 MG: 20 TABLET ORAL at 11:42

## 2018-10-25 RX ADMIN — INSULIN LISPRO 2 UNITS: 100 INJECTION, SOLUTION INTRAVENOUS; SUBCUTANEOUS at 08:42

## 2018-10-25 RX ADMIN — METRONIDAZOLE 500 MG: 500 TABLET ORAL at 17:39

## 2018-10-25 RX ADMIN — LOSARTAN POTASSIUM 25 MG: 25 TABLET, FILM COATED ORAL at 08:42

## 2018-10-25 RX ADMIN — CIPROFLOXACIN HYDROCHLORIDE 500 MG: 500 TABLET, FILM COATED ORAL at 08:42

## 2018-10-25 RX ADMIN — METRONIDAZOLE 500 MG: 500 TABLET ORAL at 10:12

## 2018-10-25 NOTE — PLAN OF CARE
Problem: DISCHARGE PLANNING - CARE MANAGEMENT  Goal: Discharge to post-acute care or home with appropriate resources  INTERVENTIONS:  - Conduct assessment to determine patient/family and health care team treatment goals, and need for post-acute services based on payer coverage, community resources, and patient preferences, and barriers to discharge  - Address psychosocial, clinical, and financial barriers to discharge as identified in assessment in conjunction with the patient/family and health care team  - Arrange appropriate level of post-acute services according to patients   needs and preference and payer coverage in collaboration with the physician and health care team  - Communicate with and update the patient/family, physician, and health care team regarding progress on the discharge plan  - Arrange appropriate transportation to post-acute venues  Outcome: Progressing  No reported needs at this time  Cm to assist with follow up appointments as needed

## 2018-10-25 NOTE — ASSESSMENT & PLAN NOTE
Admitted with acute abdo pain - s/p cscope and only evidence of colitis and polyp which was removed; still with LLQ and now c/o RUQ pain - d/w GI and will get RUQ u/s, also repeat CMP tomorrow due to elevated bili on admit, though no direct abnormalities seen and GB is surgically absent    Continue cipro/flagyl, probiotics  C diff is (-), stool studies and Giardia are (-), await ova and parasites  Her CRP is elevated  No family hx of IBD  Also noted abnormal liver finding with liver lesion    Will need outpatient MRI of the liver to characterize the lesion/outpatient follow up with GI     CMP in AM, RUQ u/s tomorrow, NPO at midnight

## 2018-10-25 NOTE — PROGRESS NOTES
Progress Note - Gary Gary 1975, 44 y o  female MRN: [id number]    Unit/Bed#: -01 Encounter: [id number]    Primary Care Provider: Beulah Haddad PA-C   Date and time admitted to hospital: 10/19/2018  9:31 PM    * Diarrhea   Assessment & Plan    Admitted with acute abdo pain - s/p cscope and only evidence of colitis and polyp which was removed; still with LLQ and now c/o RUQ pain - d/w GI and will get RUQ u/s, also repeat CMP tomorrow due to elevated bili on admit, though no direct abnormalities seen and GB is surgically absent    Continue cipro/flagyl, probiotics  C diff is (-), stool studies and Giardia are (-), await ova and parasites  Her CRP is elevated  No family hx of IBD  Also noted abnormal liver finding with liver lesion  Will need outpatient MRI of the liver to characterize the lesion/outpatient follow up with GI     CMP in AM, RUQ u/s tomorrow, NPO at midnight     Abdominal pain   Assessment & Plan    Diffuse at 1st, now seems to be left lower quadrant and today c/o RUQ pain  Continue Bentyl and as-needed pain medication  RUQ u/s as above, ? EGD tomorrow  Gastroenteritis, acute   Assessment & Plan    Treatment/plan as above  Hepatic lesion   Assessment & Plan    Seen on CT a/p - outpatient MRI liver protocol  Type 2 diabetes mellitus with hyperglycemia, with long-term current use of insulin (HCC)   Assessment & Plan    Metformin on hold  Continue sliding scale, POCs, Lantus (increase to 14u due to hyperglycemia last 24 hours)    Last A1c outpatient 9 5 in September 2018       VTE Pharmacologic Prophylaxis:   Pharmacologic: Enoxaparin (Lovenox)  Mechanical VTE Prophylaxis in Place: Yes    Patient Centered Rounds: I have performed bedside rounds with nursing staff today      Discussions with Specialists or Other Care Team Provider: d/w GI regarding ongoing pain and further workup    Education and Discussions with Family / Patient: d/w patient     Time Spent for Care: 20 minutes  More than 50% of total time spent on counseling and coordination of care as described above  Current Length of Stay: 4 day(s)    Current Patient Status: Inpatient   Certification Statement: The patient will continue to require additional inpatient hospital stay due to RUQ u/s tomorrow; repeat labs    Discharge Plan: not medically cleared     Code Status: Level 1 - Full Code      Subjective:   Patient seen examined, still having some left lower quadrant pain  No further diarrhea  She also complains of right upper quadrant abdominal pain today, she reports that somewhat reminds her of when she had cholecystitis, however does not radiate to the shoulder  Denies subjective fevers or chills  No emesis, she did have an episode of nausea earlier  Objective:     Vitals:   Temp (24hrs), Av 3 °F (36 8 °C), Min:97 5 °F (36 4 °C), Max:98 9 °F (37 2 °C)    Temp:  [97 5 °F (36 4 °C)-98 9 °F (37 2 °C)] 97 5 °F (36 4 °C)  HR:  [61-91] 82  Resp:  [16-18] 18  BP: ()/(58-85) 118/58  SpO2:  [95 %-98 %] 98 %  Body mass index is 27 85 kg/m²  Input and Output Summary (last 24 hours): Intake/Output Summary (Last 24 hours) at 10/25/18 1557  Last data filed at 10/25/18 0845   Gross per 24 hour   Intake          3531 67 ml   Output                0 ml   Net          3531 67 ml       Physical Exam:     Physical Exam   Constitutional: She is oriented to person, place, and time  She appears well-developed and well-nourished  No distress  HENT:   Mouth/Throat: Oropharynx is clear and moist    Cardiovascular: Normal rate, regular rhythm, S1 normal, S2 normal and normal heart sounds  No murmur heard  Pulmonary/Chest: Effort normal and breath sounds normal  No respiratory distress  She has no wheezes  She has no rales  Abdominal: Soft  Bowel sounds are normal  She exhibits no distension  There is tenderness in the right upper quadrant and left lower quadrant  There is no rigidity and no guarding  Musculoskeletal: She exhibits no edema  Neurological: She is alert and oriented to person, place, and time  Skin: She is not diaphoretic  Psychiatric: She has a normal mood and affect  Nursing note and vitals reviewed  Additional Data:     Labs:      Results from last 7 days  Lab Units 10/24/18  0448   WBC Thousand/uL 5 70   HEMOGLOBIN g/dL 12 9   HEMATOCRIT % 35 6   PLATELETS Thousands/uL 184   NEUTROS PCT % 51   LYMPHS PCT % 35   MONOS PCT % 9   EOS PCT % 3       Results from last 7 days  Lab Units 10/24/18  0448  10/19/18  2219   SODIUM mmol/L 143  < > 131*   POTASSIUM mmol/L 3 6  < > 3 7   CHLORIDE mmol/L 108  < > 94*   CO2 mmol/L 26  < > 23   BUN mg/dL 3*  < > 12   CREATININE mg/dL 0 57*  < > 0 95   ANION GAP mmol/L 9  < > 14*   CALCIUM mg/dL 8 6  < > 8 9   ALBUMIN g/dL  --   --  3 8   TOTAL BILIRUBIN mg/dL  --   --  3 50*   ALK PHOS U/L  --   --  85   ALT U/L  --   --  35   AST U/L  --   --  13   < > = values in this interval not displayed  Results from last 7 days  Lab Units 10/25/18  1039 10/25/18  0630 10/24/18  2101 10/24/18  1649 10/24/18  1134 10/24/18  0537 10/23/18  2134 10/23/18  1556 10/23/18  1101 10/23/18  0611 10/22/18  2113 10/22/18  1554   POC GLUCOSE mg/dl 261* 249* 234* 125 112 150* 172* 248* 213* 152* 281* 221*           Results from last 7 days  Lab Units 10/20/18  1549   LACTIC ACID mmol/L 0 9           * I Have Reviewed All Lab Data Listed Above  * Additional Pertinent Lab Tests Reviewed: All Labs Within Last 24 Hours Reviewed    Imaging:    Imaging Reports Reviewed Today Include: none new  Imaging Personally Reviewed by Myself Includes:  none    Recent Cultures (last 7 days):       Results from last 7 days  Lab Units 10/20/18  0029   C DIFF TOXIN B  NEGATIVE for C difficle toxin by PCR          Last 24 Hours Medication List:     Current Facility-Administered Medications:  acetaminophen 650 mg Oral Q6H PRN Taylor Campos PA-C    aspirin 81 mg Oral Daily Donny MERRILL Jass Gibson MD    atorvastatin 80 mg Oral Daily With Adolfo Pool MD    butalbital-acetaminophen-caffeine 1 tablet Oral Q4H PRN Liam Duran PA-C    ciprofloxacin 500 mg Oral Q12H Hilary Russell MD    dicyclomine 20 mg Oral 4x Daily (AC & HS) Paras Ferraro PA-C    enoxaparin 40 mg Subcutaneous Daily Keila Roy MD    fluticasone-vilanterol 1 puff Inhalation Daily Keila Roy MD    HYDROmorphone 0 5 mg Intravenous Q4H PRN Liam Duran PA-C    insulin glargine 14 Units Subcutaneous HS Laurie Ferraro PA-C    insulin lispro 1-5 Units Subcutaneous TID AC Laurie Ferraro PA-C    insulin lispro 1-5 Units Subcutaneous HS Laurie Ferraro PA-C    losartan 25 mg Oral Daily Donny Cook MD    metroNIDAZOLE 500 mg Oral Q8H Hilary Russell MD    montelukast 10 mg Oral Daily Keila Roy MD    oxyCODONE 5 mg Oral Q4H PRN Paras Ferraro PA-C    sodium chloride 100 mL/hr Intravenous Continuous Keila Roy MD Last Rate: 100 mL/hr (10/25/18 0841)        Today, Patient Was Seen By: Liam Duran PA-C    ** Please Note: Dictation voice to text software may have been used in the creation of this document   **

## 2018-10-25 NOTE — ASSESSMENT & PLAN NOTE
Metformin on hold    Continue sliding scale, POCs, Lantus (increase to 14u due to hyperglycemia last 24 hours)    Last A1c outpatient 9 5 in September 2018

## 2018-10-25 NOTE — SOCIAL WORK
Cm met with patient at bedside, patient alert and oriented during interview and accompanied by her daughter  patient mentioned residing in a private home with her 3 daughters ( ages 17,16,18) and so  Patient mentioned being completely independent with ADL's, no dme use, vna, str, or hx of MH or SA  Patient mentioned following up with her PCP as recommended and has adequate transportation  Patient denies having any issues with pain prior to being hospitalized  Patient mentioned filling his prescriptions at [facility] ([facility]) with no prescription barriers  Patient mentioned having a sister who resides locally however, no additional local family members  Patient stated in the event she is unable to make her follow up appointments her  is able to do so  Patient consented for cm to assist with follow up appointments as needed  No reported needs at this time  CM reviewed discharge planning process including the following: identifying help at home, patient preference for discharge planning needs, pharmacy preference, and availability of treatment team to discuss questions or concerns patient and/or family may have regarding understanding medications and recognizing signs and symptoms once discharged  CM also encouraged patient to follow up with all recommended appointments after discharge  Patient advised of importance for patient and family to participate in managing patients medical well being  CM name and role reviewed  Discharge Checklist reviewed and CM will continue to monitor for progress toward discharge goals in nursing and provider rounds

## 2018-10-25 NOTE — ASSESSMENT & PLAN NOTE
Diffuse at 1st, now seems to be left lower quadrant and today c/o RUQ pain  Continue Bentyl and as-needed pain medication  RUQ u/s as above, ? EGD tomorrow

## 2018-10-25 NOTE — PROGRESS NOTES
GI Progress Note - Jarett Marks 44 y o  female MRN: [id number]    Unit/Bed#: -01 Encounter: [id number]    Subjective: She continues to have abdominal pain which moves to different parts of her abdomen but she reports RUQ pain being worst today  She is s/p cholecystectomy  She has tolerated liquids so far  Objective:     Vitals: Blood pressure 116/81, pulse 84, temperature 98 °F (36 7 °C), temperature source Oral, resp  rate 18, height 5' 4" (1 626 m), weight 73 6 kg (162 lb 4 1 oz), last menstrual period 10/07/2018, SpO2 100 %  ,Body mass index is 27 85 kg/m²  Intake/Output Summary (Last 24 hours) at 10/25/18 1634  Last data filed at 10/25/18 0845   Gross per 24 hour   Intake          3531 67 ml   Output                0 ml   Net          3531 67 ml       Physical Exam:     General Appearance: Alert, oriented x3, no acute distress  Lungs: Clear to auscultation bilaterally, no respiratory distress  Heart: RRR, no murmur  Abdomen: Non-distended, soft, BS active, (+) mild generalized TTP without guarding  Extremities: No cyanosis or LE edema    Invasive Devices     Peripheral Intravenous Line            Peripheral IV 10/22/18 Left Arm 2 days                Lab Results:    Results from last 7 days  Lab Units 10/24/18  0448   WBC Thousand/uL 5 70   HEMOGLOBIN g/dL 12 9   HEMATOCRIT % 35 6   PLATELETS Thousands/uL 184   NEUTROS PCT % 51   LYMPHS PCT % 35   MONOS PCT % 9   EOS PCT % 3       Results from last 7 days  Lab Units 10/24/18  0448  10/19/18  2219   SODIUM mmol/L 143  < > 131*   POTASSIUM mmol/L 3 6  < > 3 7   CHLORIDE mmol/L 108  < > 94*   CO2 mmol/L 26  < > 23   BUN mg/dL 3*  < > 12   CREATININE mg/dL 0 57*  < > 0 95   CALCIUM mg/dL 8 6  < > 8 9   ALK PHOS U/L  --   --  85   ALT U/L  --   --  35   AST U/L  --   --  13   < > = values in this interval not displayed          Results from last 7 days  Lab Units 10/19/18  2219   LIPASE u/L 85       Imaging Studies: I have personally reviewed pertinent imaging studies  Ct Abdomen Pelvis With Contrast  Result Date: 10/19/2018  Impression: Abnormal appearance of portions of the large and small bowel, as described above  Please see above discussion and differential diagnosis  Gastrology consultation and follow-up is suggested  There is mild fatty infiltration of the liver  There are 2 lesions in the right lobe of the liver, measuring 2 3 x 1 7 cm and 1 8 x 1 cm, respectively  These may represent hemangiomas  Nonemergent outpatient follow-up is suggested  Assessment and Plan:     Acute Enterocolitis  - CT A/P on admission showed abnormal appearance of large and small bowel concerning for infectious v inflammatory condition given the diffuse involvement  - S/P colonoscopy yesterday which showed 15 mm polyp in cecum removed with clip placement after to prevent bleeding, mild erythema noted throughout the colon suggestive of recent colitis episode, random biopsies taken throughout the colon, TI appeared normal  - Follow up biopsies in 1-2 weeks  - Cdiff, stool culture, giardia, and O+P negative  - Fecal leukocyte stool test shows rare white blood cells, fecal calprotectin pending  - Continue cipro/flagyl and complete 7 day course total  - Suspect she has post infectious IBS symptoms as cause of her persistent pain but will evaluate further with RUQ US, CMP in the morning given she did have an elevated bilirubin initially on admission, and possible EGD tomorrow   - Advance diet as tolerated; recommend lactose free and low fiber  - She remains afebrile and without leukocytosis  - Bentyl 20 mg QID  - NPO After midnight for possible EGD tomorrow pending any RUQ US findings     Hemangioma  - CT on admission showed 2 hypodense lesions in the R lobe of the liver which appear to be hemangiomas  - She will need an MRI as outpatient for further evaluation; RUQ US tomorrow as above       The patient was seen and examined by Dr Sampson España

## 2018-10-25 NOTE — PLAN OF CARE
DISCHARGE PLANNING     Discharge to home or other facility with appropriate resources Progressing        GASTROINTESTINAL - ADULT     Minimal or absence of nausea and/or vomiting Progressing     Maintains or returns to baseline bowel function Progressing     Maintains adequate nutritional intake Progressing        INFECTION - ADULT     Absence or prevention of progression during hospitalization Progressing        Knowledge Deficit     Patient/family/caregiver demonstrates understanding of disease process, treatment plan, medications, and discharge instructions Progressing        METABOLIC, FLUID AND ELECTROLYTES - ADULT     Electrolytes maintained within normal limits Progressing     Fluid balance maintained Progressing     Glucose maintained within target range Progressing        PAIN - ADULT     Verbalizes/displays adequate comfort level or baseline comfort level Progressing        Potential for Falls     Patient will remain free of falls Progressing        SAFETY ADULT     Patient will remain free of falls Progressing

## 2018-10-26 ENCOUNTER — ANESTHESIA EVENT (INPATIENT)
Dept: PERIOP | Facility: HOSPITAL | Age: 43
DRG: 392 | End: 2018-10-26
Payer: COMMERCIAL

## 2018-10-26 ENCOUNTER — APPOINTMENT (INPATIENT)
Dept: ULTRASOUND IMAGING | Facility: HOSPITAL | Age: 43
DRG: 392 | End: 2018-10-26
Payer: COMMERCIAL

## 2018-10-26 ENCOUNTER — ANESTHESIA (INPATIENT)
Dept: PERIOP | Facility: HOSPITAL | Age: 43
DRG: 392 | End: 2018-10-26
Payer: COMMERCIAL

## 2018-10-26 LAB
ALBUMIN SERPL BCP-MCNC: 2.4 G/DL (ref 3.5–5)
ALP SERPL-CCNC: 59 U/L (ref 46–116)
ALT SERPL W P-5'-P-CCNC: 29 U/L (ref 12–78)
ANION GAP SERPL CALCULATED.3IONS-SCNC: 8 MMOL/L (ref 4–13)
AST SERPL W P-5'-P-CCNC: 18 U/L (ref 5–45)
BILIRUB SERPL-MCNC: 0.6 MG/DL (ref 0.2–1)
BUN SERPL-MCNC: 6 MG/DL (ref 5–25)
CALCIUM SERPL-MCNC: 8.2 MG/DL (ref 8.3–10.1)
CHLORIDE SERPL-SCNC: 108 MMOL/L (ref 100–108)
CO2 SERPL-SCNC: 26 MMOL/L (ref 21–32)
CREAT SERPL-MCNC: 0.65 MG/DL (ref 0.6–1.3)
GFR SERPL CREATININE-BSD FRML MDRD: 110 ML/MIN/1.73SQ M
GLUCOSE SERPL-MCNC: 108 MG/DL (ref 65–140)
GLUCOSE SERPL-MCNC: 142 MG/DL (ref 65–140)
GLUCOSE SERPL-MCNC: 161 MG/DL (ref 65–140)
GLUCOSE SERPL-MCNC: 174 MG/DL (ref 65–140)
GLUCOSE SERPL-MCNC: 285 MG/DL (ref 65–140)
POTASSIUM SERPL-SCNC: 3.5 MMOL/L (ref 3.5–5.3)
PROT SERPL-MCNC: 5.6 G/DL (ref 6.4–8.2)
SODIUM SERPL-SCNC: 142 MMOL/L (ref 136–145)

## 2018-10-26 PROCEDURE — 82948 REAGENT STRIP/BLOOD GLUCOSE: CPT

## 2018-10-26 PROCEDURE — 80053 COMPREHEN METABOLIC PANEL: CPT | Performed by: PHYSICIAN ASSISTANT

## 2018-10-26 PROCEDURE — 99232 SBSQ HOSP IP/OBS MODERATE 35: CPT | Performed by: PHYSICIAN ASSISTANT

## 2018-10-26 PROCEDURE — 88305 TISSUE EXAM BY PATHOLOGIST: CPT | Performed by: PATHOLOGY

## 2018-10-26 PROCEDURE — 43239 EGD BIOPSY SINGLE/MULTIPLE: CPT | Performed by: INTERNAL MEDICINE

## 2018-10-26 PROCEDURE — 76705 ECHO EXAM OF ABDOMEN: CPT

## 2018-10-26 PROCEDURE — 0DB68ZX EXCISION OF STOMACH, VIA NATURAL OR ARTIFICIAL OPENING ENDOSCOPIC, DIAGNOSTIC: ICD-10-PCS | Performed by: INTERNAL MEDICINE

## 2018-10-26 PROCEDURE — 0DB98ZX EXCISION OF DUODENUM, VIA NATURAL OR ARTIFICIAL OPENING ENDOSCOPIC, DIAGNOSTIC: ICD-10-PCS | Performed by: INTERNAL MEDICINE

## 2018-10-26 PROCEDURE — 88342 IMHCHEM/IMCYTCHM 1ST ANTB: CPT | Performed by: PATHOLOGY

## 2018-10-26 RX ORDER — PROPOFOL 10 MG/ML
INJECTION, EMULSION INTRAVENOUS AS NEEDED
Status: DISCONTINUED | OUTPATIENT
Start: 2018-10-26 | End: 2018-10-26 | Stop reason: SURG

## 2018-10-26 RX ORDER — LIDOCAINE HYDROCHLORIDE 10 MG/ML
INJECTION, SOLUTION INFILTRATION; PERINEURAL AS NEEDED
Status: DISCONTINUED | OUTPATIENT
Start: 2018-10-26 | End: 2018-10-26 | Stop reason: SURG

## 2018-10-26 RX ADMIN — ATORVASTATIN CALCIUM 80 MG: 40 TABLET, FILM COATED ORAL at 16:58

## 2018-10-26 RX ADMIN — METRONIDAZOLE 500 MG: 500 TABLET ORAL at 09:00

## 2018-10-26 RX ADMIN — PROPOFOL 50 MG: 10 INJECTION, EMULSION INTRAVENOUS at 14:30

## 2018-10-26 RX ADMIN — METRONIDAZOLE 500 MG: 500 TABLET ORAL at 16:58

## 2018-10-26 RX ADMIN — OXYCODONE HYDROCHLORIDE 5 MG: 5 TABLET ORAL at 21:09

## 2018-10-26 RX ADMIN — PROPOFOL 100 MG: 10 INJECTION, EMULSION INTRAVENOUS at 14:28

## 2018-10-26 RX ADMIN — OXYCODONE HYDROCHLORIDE 5 MG: 5 TABLET ORAL at 02:08

## 2018-10-26 RX ADMIN — OXYCODONE HYDROCHLORIDE 5 MG: 5 TABLET ORAL at 06:14

## 2018-10-26 RX ADMIN — SODIUM CHLORIDE 100 ML/HR: 0.9 INJECTION, SOLUTION INTRAVENOUS at 11:39

## 2018-10-26 RX ADMIN — HYDROMORPHONE HYDROCHLORIDE 0.5 MG: 1 INJECTION, SOLUTION INTRAMUSCULAR; INTRAVENOUS; SUBCUTANEOUS at 05:32

## 2018-10-26 RX ADMIN — INSULIN GLARGINE 14 UNITS: 100 INJECTION, SOLUTION SUBCUTANEOUS at 21:39

## 2018-10-26 RX ADMIN — DICYCLOMINE HYDROCHLORIDE 20 MG: 20 TABLET ORAL at 06:14

## 2018-10-26 RX ADMIN — SODIUM CHLORIDE 100 ML/HR: 0.9 INJECTION, SOLUTION INTRAVENOUS at 21:39

## 2018-10-26 RX ADMIN — ENOXAPARIN SODIUM 40 MG: 40 INJECTION SUBCUTANEOUS at 09:02

## 2018-10-26 RX ADMIN — DICYCLOMINE HYDROCHLORIDE 20 MG: 20 TABLET ORAL at 16:58

## 2018-10-26 RX ADMIN — LIDOCAINE HYDROCHLORIDE 50 MG: 10 INJECTION, SOLUTION INFILTRATION; PERINEURAL at 14:28

## 2018-10-26 RX ADMIN — INSULIN LISPRO 3 UNITS: 100 INJECTION, SOLUTION INTRAVENOUS; SUBCUTANEOUS at 21:39

## 2018-10-26 RX ADMIN — OXYCODONE HYDROCHLORIDE 5 MG: 5 TABLET ORAL at 16:58

## 2018-10-26 RX ADMIN — LOSARTAN POTASSIUM 25 MG: 25 TABLET, FILM COATED ORAL at 09:01

## 2018-10-26 RX ADMIN — DICYCLOMINE HYDROCHLORIDE 20 MG: 20 TABLET ORAL at 21:09

## 2018-10-26 RX ADMIN — METRONIDAZOLE 500 MG: 500 TABLET ORAL at 01:50

## 2018-10-26 RX ADMIN — ASPIRIN 81 MG 81 MG: 81 TABLET ORAL at 09:28

## 2018-10-26 RX ADMIN — SODIUM CHLORIDE 100 ML/HR: 0.9 INJECTION, SOLUTION INTRAVENOUS at 02:13

## 2018-10-26 RX ADMIN — MONTELUKAST SODIUM 10 MG: 10 TABLET, FILM COATED ORAL at 09:01

## 2018-10-26 RX ADMIN — CIPROFLOXACIN HYDROCHLORIDE 500 MG: 500 TABLET, FILM COATED ORAL at 21:09

## 2018-10-26 RX ADMIN — OXYCODONE HYDROCHLORIDE 5 MG: 5 TABLET ORAL at 11:38

## 2018-10-26 RX ADMIN — CIPROFLOXACIN HYDROCHLORIDE 500 MG: 500 TABLET, FILM COATED ORAL at 09:01

## 2018-10-26 NOTE — ANESTHESIA POSTPROCEDURE EVALUATION
Post-Op Assessment Note      CV Status:  Stable    Mental Status:  Somnolent    Hydration Status:  Euvolemic    PONV Controlled:  Controlled    Airway Patency:  Patent    Post Op Vitals Reviewed: Yes          Staff: CRNA           BP 93/51 (10/26/18 1437)    Temp 97 8 °F (36 6 °C) (10/26/18 1437)    Pulse 79 (10/26/18 1437)   Resp 18 (10/26/18 1437)    SpO2 92 % (10/26/18 1437)

## 2018-10-26 NOTE — ASSESSMENT & PLAN NOTE
Metformin on hold  Continue sliding scale, POCs, Lantus (increase to 14u due to hyperglycemia, now better)  Last A1c outpatient 9 5 in September 2018 - nutrition consulted for education

## 2018-10-26 NOTE — PROGRESS NOTES
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  Continue cipro/flagyl, probiotics course x 7 days total per GI   C diff is (-), stool studies and Giardia are (-), ova and parasites (-)  Her CRP is elevated  No family hx of IBD  Abdominal pain   Assessment & Plan    See above     Hepatic lesion   Assessment & Plan    Seen on CT a/p - outpatient MRI liver protocol, nonemergent  Type 2 diabetes mellitus with hyperglycemia, with long-term current use of insulin (HCC)   Assessment & Plan    Metformin on hold  Continue sliding scale, POCs, Lantus (increase to 14u due to hyperglycemia, now better)  Last A1c outpatient 9 5 in September 2018 - nutrition consulted for education  VTE Pharmacologic Prophylaxis:   Pharmacologic: Enoxaparin (Lovenox)  Mechanical VTE Prophylaxis in Place: Yes    Patient Centered Rounds: I have performed bedside rounds with nursing staff today  Discussions with Specialists or Other Care Team Provider: GI    Education and Discussions with Family / Patient: patient     Time Spent for Care: 20 minutes  More than 50% of total time spent on counseling and coordination of care as described above      Current Length of Stay: 5 day(s)    Current Patient Status: Inpatient   Certification Statement: The patient will continue to require additional inpatient hospital stay due to monitor overnight, home tomorrow    Discharge Plan: home tomorrow     Code Status: Level 1 - Full Code      Subjective:   Patient seen prior to EGD, still having intermittent diffuse abdominal pain  We have been Descalating her IV narcotics and she has not noticed any increase in her pain  No nausea or emesis, but she is also NPO at the time of my evaluation  Objective:     Vitals:   Temp (24hrs), Av 2 °F (36 8 °C), Min:97 8 °F (36 6 °C), Max:98 6 °F (37 °C)    Temp:  [97 8 °F (36 6 °C)-98 6 °F (37 °C)] 98 1 °F (36 7 °C)  HR:  [67-84] 77  Resp:  [14-18] 18  BP: ()/(51-81) 113/67  SpO2:  [92 %-100 %] 97 %  Body mass index is 27 85 kg/m²  Input and Output Summary (last 24 hours): Intake/Output Summary (Last 24 hours) at 10/26/18 1609  Last data filed at 10/26/18 1433   Gross per 24 hour   Intake          2111 67 ml   Output                0 ml   Net          211 67 ml       Physical Exam:     Physical Exam   Constitutional: She is oriented to person, place, and time  She appears well-developed and well-nourished  No distress  HENT:   Mouth/Throat: Oropharynx is clear and moist    Cardiovascular: Normal rate, regular rhythm, S1 normal, S2 normal and normal heart sounds  Pulmonary/Chest: Effort normal and breath sounds normal  No respiratory distress  She has no rales  Abdominal: Soft  Bowel sounds are normal  She exhibits no distension  There is tenderness (mild, diffuse)  There is no guarding  Pain seems distractable   Musculoskeletal: She exhibits no edema  Neurological: She is alert and oriented to person, place, and time  Psychiatric: She has a normal mood and affect  Nursing note and vitals reviewed      Additional Data:     Labs:      Results from last 7 days  Lab Units 10/24/18  0448   WBC Thousand/uL 5 70   HEMOGLOBIN g/dL 12 9   HEMATOCRIT % 35 6   PLATELETS Thousands/uL 184   NEUTROS PCT % 51   LYMPHS PCT % 35   MONOS PCT % 9   EOS PCT % 3       Results from last 7 days  Lab Units 10/26/18  0529   SODIUM mmol/L 142   POTASSIUM mmol/L 3 5   CHLORIDE mmol/L 108   CO2 mmol/L 26   BUN mg/dL 6 CREATININE mg/dL 0 65   ANION GAP mmol/L 8   CALCIUM mg/dL 8 2*   ALBUMIN g/dL 2 4*   TOTAL BILIRUBIN mg/dL 0 60   ALK PHOS U/L 59   ALT U/L 29   AST U/L 18           Results from last 7 days  Lab Units 10/26/18  1058 10/26/18  0615 10/25/18  2211 10/25/18  1634 10/25/18  1039 10/25/18  0630 10/24/18  2101 10/24/18  1649 10/24/18  1134 10/24/18  0537 10/23/18  2134 10/23/18  1556   POC GLUCOSE mg/dl 142* 161* 255* 242* 261* 249* 234* 125 112 150* 172* 248*           Results from last 7 days  Lab Units 10/20/18  1549   LACTIC ACID mmol/L 0 9           * I Have Reviewed All Lab Data Listed Above  * Additional Pertinent Lab Tests Reviewed: All Labs Within Last 24 Hours Reviewed    Imaging:    Imaging Reports Reviewed Today Include: EGD report       Recent Cultures (last 7 days):       Results from last 7 days  Lab Units 10/20/18  0029   C DIFF TOXIN B  NEGATIVE for C difficle toxin by PCR          Last 24 Hours Medication List:     Current Facility-Administered Medications:  acetaminophen 650 mg Oral Q6H PRN Harvinder Brood, IAN    aspirin 81 mg Oral Daily Nancy Loaiza MD    atorvastatin 80 mg Oral Daily With Mirza Carlton MD    butalbital-acetaminophen-caffeine 1 tablet Oral Q4H PRN Harvinder Brood, IAN    ciprofloxacin 500 mg Oral Q12H Nicole Barnard MD    dicyclomine 20 mg Oral 4x Daily (AC & HS) Fanta Ferraro PA-C    enoxaparin 40 mg Subcutaneous Daily Nancy Loaiza MD    fluticasone-vilanterol 1 puff Inhalation Daily Nancy Loaiza MD    insulin glargine 14 Units Subcutaneous HS Laurie Ferraro PA-C    insulin lispro 1-5 Units Subcutaneous TID AC Laurie Ferraro PA-C    insulin lispro 1-5 Units Subcutaneous HS Laurie Ferraro PA-C    losartan 25 mg Oral Daily Nancy Loaiza MD    metroNIDAZOLE 500 mg Oral Q8H Nicole Barnard MD    montelukast 10 mg Oral Daily Nancy Loaiza MD    oxyCODONE 5 mg Oral Q4H PRN Laurie Ferraro PA-C    sodium chloride 100 mL/hr Intravenous Continuous Faye Caraballo MD Last Rate: 100 mL/hr (10/26/18 1139)        Today, Patient Was Seen By: Alfonso Eastman PA-C    ** Please Note: Dictation voice to text software may have been used in the creation of this document   **

## 2018-10-26 NOTE — PLAN OF CARE
DISCHARGE PLANNING     Discharge to home or other facility with appropriate resources Progressing        DISCHARGE PLANNING - CARE MANAGEMENT     Discharge to post-acute care or home with appropriate resources Progressing        GASTROINTESTINAL - ADULT     Minimal or absence of nausea and/or vomiting Progressing     Maintains or returns to baseline bowel function Progressing     Maintains adequate nutritional intake Progressing        INFECTION - ADULT     Absence or prevention of progression during hospitalization Progressing        Knowledge Deficit     Patient/family/caregiver demonstrates understanding of disease process, treatment plan, medications, and discharge instructions Progressing        METABOLIC, FLUID AND ELECTROLYTES - ADULT     Electrolytes maintained within normal limits Progressing     Fluid balance maintained Progressing     Glucose maintained within target range Progressing        PAIN - ADULT     Verbalizes/displays adequate comfort level or baseline comfort level Progressing        Potential for Falls     Patient will remain free of falls Progressing        SAFETY ADULT     Patient will remain free of falls Progressing

## 2018-10-26 NOTE — UTILIZATION REVIEW
Continued Stay Review    Date: 10/26/2018    Vital Signs: /67 (BP Location: Right arm)   Pulse 71   Temp 98 5 °F (36 9 °C) (Oral)   Resp 18   Ht 5' 4" (1 626 m)   Wt 73 6 kg (162 lb 4 1 oz)   LMP 10/07/2018   SpO2 93%   BMI 27 85 kg/m²     Medications:   Scheduled Meds:   Current Facility-Administered Medications:  aspirin 81 mg Oral Daily   atorvastatin 80 mg Oral Daily With Dinner   ciprofloxacin 500 mg Oral Q12H [address]   dicyclomine 20 mg Oral 4x Daily (AC & HS)   enoxaparin 40 mg Subcutaneous Daily   fluticasone-vilanterol 1 puff Inhalation Daily   insulin glargine 14 Units Subcutaneous HS   insulin lispro 1-5 Units Subcutaneous TID AC   insulin lispro 1-5 Units Subcutaneous HS   losartan 25 mg Oral Daily   metroNIDAZOLE 500 mg Oral Q8H [address]   montelukast 10 mg Oral Daily     Continuous Infusions:   sodium chloride 100 mL/hr Last Rate: 100 mL/hr (10/26/18 1139)     PRN Meds:   acetaminophen    butalbital-acetaminophen-caffeine    oxyCODONE    Abnormal Labs/Diagnostic Results: Glucose 174, Ca 8 2    Age/Sex: 44 y o  female     Assessment/Plan:     * Diarrhea   Assessment & Plan     Admitted with acute abdo pain - s/p cscope and only evidence of colitis and polyp which was removed; still with LLQ and now c/o RUQ pain - d/w GI and will get RUQ u/s, also repeat CMP tomorrow due to elevated bili on admit, though no direct abnormalities seen and GB is surgically absent     Continue cipro/flagyl, probiotics  C diff is (-), stool studies and Giardia are (-), await ova and parasites  Her CRP is elevated  No family hx of IBD      Also noted abnormal liver finding with liver lesion  Will need outpatient MRI of the liver to characterize the lesion/outpatient follow up with GI      CMP in AM, RUQ u/s tomorrow, NPO at midnight      Abdominal pain   Assessment & Plan     Diffuse at 1st, now seems to be left lower quadrant and today c/o RUQ pain  Continue Bentyl and as-needed pain medication    RUQ u/s as above, ? EGD tomorrow       Gastroenteritis, acute   Assessment & Plan     Treatment/plan as above       Hepatic lesion   Assessment & Plan     Seen on CT a/p - outpatient MRI liver protocol       Type 2 diabetes mellitus with hyperglycemia, with long-term current use of insulin (HCC)   Assessment & Plan     Metformin on hold  Continue sliding scale, POCs, Lantus (increase to 14u due to hyperglycemia last 24 hours)     Last A1c outpatient 9 5 in September 2018         VTE Pharmacologic Prophylaxis:   Pharmacologic: Enoxaparin (Lovenox)  Mechanical VTE Prophylaxis in Place:  Yes     Current Length of Stay: 4 day(s)     Current Patient Status: Inpatient   Certification Statement: The patient will continue to require additional inpatient hospital stay due to RUQ u/s tomorrow; repeat labs     Discharge Plan: not medically cleared

## 2018-10-26 NOTE — NUTRITION
10/26/18 1636   Recommendations/Interventions   Summary Consulted for DM diet education  Provided DM diet education including CHO counting, handouts for at home use provided and outpatient counseling brochure provided as well      Nutrition Complexity Risk   Nutrition complexity level Low risk   Follow up date 11/02/18

## 2018-10-26 NOTE — ASSESSMENT & PLAN NOTE
Admitted with acute abdo pain - s/p cscope 10/24 and only evidence of colitis and polyp which was removed; she is s/p EGD 10/26 with nonerosive gastritis, biopsy taken    Monitor overnight, likely home tomorrow  Suspect post-infectious IBS etiology of persistent symptoms  Continue cipro/flagyl, probiotics course x 7 days total per GI   C diff is (-), stool studies and Giardia are (-), ova and parasites (-)  Her CRP is elevated  No family hx of IBD

## 2018-10-26 NOTE — ANESTHESIA PREPROCEDURE EVALUATION
Review of Systems/Medical History  Patient summary reviewed  Chart reviewed      Cardiovascular  Hyperlipidemia,    Pulmonary  Asthma , PRN med  controlled Last rescue: < 1 month ago Asthma type of rescue: PRN inhaler,        GI/Hepatic      Comment: Colitis, likely infectious, c diff negative     Negative  ROS        Endo/Other  Diabetes poorly controlled Insulin,      GYN       Hematology   Musculoskeletal  Negative musculoskeletal ROS        Neurology  Negative neurology ROS      Psychology   Negative psychology ROS              Physical Exam    Airway    Mallampati score: II  TM Distance: >3 FB  Neck ROM: full     Dental       Cardiovascular  Rhythm: regular, Rate: normal,     Pulmonary      Other Findings        Anesthesia Plan  ASA Score- 2     Anesthesia Type- IV sedation with anesthesia with ASA Monitors  Additional Monitors:   Airway Plan:         Plan Factors-    Induction- intravenous  Postoperative Plan-     Informed Consent- Anesthetic plan and risks discussed with patient  I personally reviewed this patient with the CRNA  Discussed and agreed on the Anesthesia Plan with the CRNA  Lu Dominguez

## 2018-10-26 NOTE — OP NOTE
OPERATIVE REPORT  PATIENT NAME: Shelley Choudhary    :  1975  MRN: [id number]  Pt Location: [facility] ROOM 02    SURGERY DATE: 10/26/2018    Surgeon(s) and Role:     * Ha Gross MD - Primary    Preop Diagnosis:  Abdominal pain [R10 9]    Post-Op Diagnosis Codes:     * Abdominal pain [R10 9]    Procedure(s) (LRB):  ESOPHAGOGASTRODUODENOSCOPY (EGD) (N/A)    Specimen(s):  ID Type Source Tests Collected by Time Destination   1 : duodenum Tissue Duodenum TISSUE EXAM Ha Gross MD 10/26/2018 1432    2 : stomach Tissue Stomach TISSUE EXAM Ha Gross MD 10/26/2018 1433        Estimated Blood Loss:   Minimal    ESOPHAGOGASTRODUODENOSCOPY    PROCEDURE: EGD    SEDATION: Monitored anesthesia care, check anesthesia records    ASA Class: 2    INDICATIONS:  Epigastric    CONSENT:  Informed consent was obtained for the procedure, including sedation after explaining the risks and benefits of the procedure  Risks including but not limited to bleeding, perforation, infection, and missed lesion  PREPARATION:   Telemetry, pulse oximetry, blood pressure were monitored throughout the procedure  Patient was identified by myself both verbally and by visual inspection of ID band  DESCRIPTION:   Patient was placed in the left lateral decubitus position and was sedated with the above medication  The gastroscope was introduced in to the oropharynx and the esophagus was intubated under direct visualization  Scope was passed down the esophagus up to 2nd part of the duodenum  A careful inspection was made as the gastroscope was withdrawn, including a retroflexed view of the stomach; findings and interventions are described below  FINDINGS:    #1  Esophagus- normal    #2  Stomach- mild nonerosive gastritis status post biopsy for H pylori    #3   Duodenum- normal status post biopsy for celiac disease       IMPRESSIONS:    Mild nonerosive gastritis status post biopsy for HP      RECOMMENDATIONS:     A follow-up biopsy results    COMPLICATIONS:  None; patient tolerated the procedure well            DISPOSITION: PACU           CONDITION: Stable      SIGNATURE: Arlette Mcintyre MD  DATE: October 26, 2018  TIME: 2:34 PM

## 2018-10-27 VITALS
HEART RATE: 72 BPM | WEIGHT: 162.26 LBS | HEIGHT: 64 IN | TEMPERATURE: 98.3 F | SYSTOLIC BLOOD PRESSURE: 110 MMHG | OXYGEN SATURATION: 95 % | BODY MASS INDEX: 27.7 KG/M2 | DIASTOLIC BLOOD PRESSURE: 68 MMHG | RESPIRATION RATE: 18 BRPM

## 2018-10-27 LAB
GLUCOSE SERPL-MCNC: 170 MG/DL (ref 65–140)
GLUCOSE SERPL-MCNC: 240 MG/DL (ref 65–140)

## 2018-10-27 PROCEDURE — 99239 HOSP IP/OBS DSCHRG MGMT >30: CPT | Performed by: NURSE PRACTITIONER

## 2018-10-27 PROCEDURE — 82948 REAGENT STRIP/BLOOD GLUCOSE: CPT

## 2018-10-27 RX ORDER — OXYCODONE HYDROCHLORIDE 5 MG/1
5 TABLET ORAL EVERY 4 HOURS PRN
Qty: 15 TABLET | Refills: 0 | Status: SHIPPED | OUTPATIENT
Start: 2018-10-27 | End: 2018-11-06

## 2018-10-27 RX ORDER — DICYCLOMINE HCL 20 MG
20 TABLET ORAL
Qty: 60 TABLET | Refills: 0 | Status: SHIPPED | OUTPATIENT
Start: 2018-10-27 | End: 2018-11-18

## 2018-10-27 RX ADMIN — SODIUM CHLORIDE 100 ML/HR: 0.9 INJECTION, SOLUTION INTRAVENOUS at 09:09

## 2018-10-27 RX ADMIN — INSULIN LISPRO 1 UNITS: 100 INJECTION, SOLUTION INTRAVENOUS; SUBCUTANEOUS at 08:53

## 2018-10-27 RX ADMIN — INSULIN LISPRO 2 UNITS: 100 INJECTION, SOLUTION INTRAVENOUS; SUBCUTANEOUS at 12:33

## 2018-10-27 RX ADMIN — DICYCLOMINE HYDROCHLORIDE 20 MG: 20 TABLET ORAL at 06:34

## 2018-10-27 RX ADMIN — ENOXAPARIN SODIUM 40 MG: 40 INJECTION SUBCUTANEOUS at 08:25

## 2018-10-27 RX ADMIN — OXYCODONE HYDROCHLORIDE 5 MG: 5 TABLET ORAL at 06:34

## 2018-10-27 RX ADMIN — METRONIDAZOLE 500 MG: 500 TABLET ORAL at 10:55

## 2018-10-27 RX ADMIN — MONTELUKAST SODIUM 10 MG: 10 TABLET, FILM COATED ORAL at 08:25

## 2018-10-27 RX ADMIN — DICYCLOMINE HYDROCHLORIDE 20 MG: 20 TABLET ORAL at 12:32

## 2018-10-27 RX ADMIN — CIPROFLOXACIN HYDROCHLORIDE 500 MG: 500 TABLET, FILM COATED ORAL at 08:25

## 2018-10-27 RX ADMIN — ASPIRIN 81 MG 81 MG: 81 TABLET ORAL at 08:25

## 2018-10-27 RX ADMIN — LOSARTAN POTASSIUM 25 MG: 25 TABLET, FILM COATED ORAL at 08:25

## 2018-10-27 RX ADMIN — METRONIDAZOLE 500 MG: 500 TABLET ORAL at 02:01

## 2018-10-27 RX ADMIN — OXYCODONE HYDROCHLORIDE 5 MG: 5 TABLET ORAL at 02:01

## 2018-10-27 NOTE — DISCHARGE SUMMARY
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  Patient completed 7 day course of cipro/flagyl, probiotics course x 7 days total per GI   C diff is (-), stool studies and Giardia are (-), ova and parasites (-)  Her CRP is elevated  No family hx of IBD  EGD taken yesterday sample taken for H pylori testing patient to follow up with GI as outpatient for results  Type 2 diabetes mellitus with hyperglycemia, with long-term current use of insulin (HCC)   Assessment & Plan    Metformin on hold  Continue sliding scale, POCs, Lantus (increase to 14u due to hyperglycemia, now better)  Last A1c outpatient 9 5 in September 2018 - nutrition consulted for education  Gastroenteritis, acute   Assessment & Plan    Treatment/plan as above  Hepatic lesion   Assessment & Plan    Seen on CT a/p - outpatient MRI liver protocol, nonemergent       Abdominal pain   Assessment & Plan    See above           Discharging Physician / Practitioner: MEGHAN Crouch  PCP: Mohini Waller PA-C  Admission Date:   Admission Orders     Ordered        10/21/18 2241  Inpatient Admission  Once         10/20/18 0005  Place in Observation (expected length of stay for this patient is less than two midnights)  Once             Discharge Date: 10/27/18    Resolved Problems  Date Reviewed: 10/27/2018          Resolved    Sinus tachycardia 10/21/2018     Resolved by  Robert Mathias, [facility] Stay:  · Gastroenterology    Procedures Performed:     · Right upper quadrant ultrasound and identified liver lesion is identified on previous CT head hypo echo catherine lesion measuring 2 6 x 2 1 patient is aware and is to follow up with Gastroenterology as outpatient  Significant Findings / Test Results:     · Diarrhea likely secondary to infectious colitis  · Abdominal pain likely due to above    Incidental Findings:   · Hepatic lesion patient is aware outpatient follow-up with GI     Test Results Pending at Discharge (will require follow up):   · H pylori testing     Outpatient Tests Requested:  · Per GI    Complications:  Abdominal pain    Reason for Admission:  Diarrhea    Hospital Course:     Charlotte Ledezma is a 44 y o  female patient who originally presented to the hospital on 10/19/2018 due to 5 days persisted history of diarrhea and vomiting associated diffuse abdominal pain and discomfort  Patient came to the ED for further evaluation where patient had multiple episodes of diarrhea that persisted patient was admitted GI consult evaluated patient felt this was an colitis treated patient 7 days with Flagyl and Cipro patient's case company with history of fibromyalgia diabetes and elevated bilirubin  Patient progressive symptoms prompted patient to have an EGD done were H pylori testing was taken and patient had noted mild gastritis  Patient was started further on Bentyl with stabilization of her symptoms and given overall improvement of her bilirubin patient was discharged home with recommend outpatient follow-up for test results and further symptom control with Gastroenterology  Please see above list of diagnoses and related plan for additional information       Condition at Discharge: good     Discharge Day Visit / Exam:     Subjective:  Patient reports abdominal pain right upper quadrant was been her focus however denies fevers chills chest pain further diarrhea and states she does feel stable enough to go home at this point  Patient understands the need to follow up with GI for test results and ongoing symptom relief  Vitals: Blood Pressure: 110/68 (10/27/18 1100)  Pulse: 72 (10/27/18 1100)  Temperature: 98 3 °F (36 8 °C) (10/27/18 1100)  Temp Source: Oral (10/27/18 1100)  Respirations: 18 (10/27/18 1100)  Height: 5' 4" (162 6 cm) (10/20/18 1412)  Weight - Scale: 73 6 kg (162 lb 4 1 oz) (10/20/18 1412)  SpO2: 95 % (10/27/18 1100)  Exam:   Physical Exam   Constitutional: She is oriented to person, place, and time  She appears well-developed and well-nourished  HENT:   Head: Normocephalic and atraumatic  Eyes: Conjunctivae and EOM are normal    Neck: Normal range of motion  Cardiovascular: Normal rate, regular rhythm and normal heart sounds  Pulmonary/Chest: Effort normal and breath sounds normal    Abdominal: Soft  Bowel sounds are normal  There is tenderness in the right upper quadrant  Musculoskeletal: Normal range of motion  Neurological: She is alert and oriented to person, place, and time  Skin: Skin is warm and dry  Psychiatric: She has a normal mood and affect  Her behavior is normal          Discharge instructions/Information to patient and family:   See after visit summary for information provided to patient and family  Provisions for Follow-Up Care:  See after visit summary for information related to follow-up care and any pertinent home health orders  Disposition:     Home    For Discharges to [facility]:   · Not Applicable to this Patient - Not Applicable to this Patient    Planned Readmission:  None     Discharge Statement:  I spent 32 minutes discharging the patient  This time was spent on the day of discharge  I had direct contact with the patient on the day of discharge   Greater than 50% of the total time was spent examining patient, answering all patient questions, arranging and discussing plan of care with patient as well as directly providing post-discharge instructions  Additional time then spent on discharge activities  Discharge Medications:  See after visit summary for reconciled discharge medications provided to patient and family        ** Please Note: This note has been constructed using a voice recognition system **

## 2018-10-27 NOTE — ASSESSMENT & PLAN NOTE
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

## 2018-10-29 NOTE — UTILIZATION REVIEW
Notification of Discharge  This is a Notification of Discharge from our facility [address]  Please be advised that this patient has been discharge from our facility  Below you will find the admission and discharge date and time including the patients disposition  PRESENTATION DATE: 10/19/2018  9:31 PM  IP ADMISSION DATE: 10/21/18 2241  DISCHARGE DATE: 10/27/2018  1:16 PM  DISPOSITION: [address] [place] [address] in the [facility] Util[facility] Review [facility]  Phone: [phone]; Fax [phone]  ATTENTION: The Network Utilization Review Department is now centralized for our 9 Facilities  Make a note that we have a new phone and fax numbers for our Department  Please call with any questions or concerns to [phone] and carefully follow the prompts so that you are directed to the right person  All voicemails are confidential  Fax any determinations, approvals, denials, and requests for initial or continue stay review clinical to [phone]  Due to HIGH CALL volume, it would be easier if you could please send faxed requests to expedite your requests and in part, help us provide discharge notifications faster

## 2018-10-30 ENCOUNTER — TELEPHONE (OUTPATIENT)
Dept: GASTROENTEROLOGY | Facility: MEDICAL CENTER | Age: 43
End: 2018-10-30

## 2018-10-30 NOTE — TELEPHONE ENCOUNTER
----- Message from Earl Hess MD sent at 10/29/2018  2:42 PM EDT -----  Call patient to report normal results

## 2018-11-01 LAB — CALPROTECTIN STL-MCNT: <16 UG/G (ref 0–120)

## 2018-11-18 ENCOUNTER — APPOINTMENT (EMERGENCY)
Dept: CT IMAGING | Facility: HOSPITAL | Age: 43
End: 2018-11-18
Payer: COMMERCIAL

## 2018-11-18 ENCOUNTER — HOSPITAL ENCOUNTER (EMERGENCY)
Facility: HOSPITAL | Age: 43
Discharge: HOME | End: 2018-11-18
Attending: EMERGENCY MEDICINE | Admitting: EMERGENCY MEDICINE
Payer: COMMERCIAL

## 2018-11-18 VITALS
RESPIRATION RATE: 14 BRPM | DIASTOLIC BLOOD PRESSURE: 58 MMHG | WEIGHT: 159.61 LBS | HEART RATE: 66 BPM | BODY MASS INDEX: 27.4 KG/M2 | SYSTOLIC BLOOD PRESSURE: 93 MMHG | OXYGEN SATURATION: 98 % | TEMPERATURE: 97.7 F

## 2018-11-18 DIAGNOSIS — R11.2 NAUSEA VOMITING AND DIARRHEA: ICD-10-CM

## 2018-11-18 DIAGNOSIS — K29.70 GASTRITIS: ICD-10-CM

## 2018-11-18 DIAGNOSIS — R10.9 ABDOMINAL PAIN: Primary | ICD-10-CM

## 2018-11-18 DIAGNOSIS — R19.7 NAUSEA VOMITING AND DIARRHEA: ICD-10-CM

## 2018-11-18 DIAGNOSIS — K52.9 ENTERITIS: ICD-10-CM

## 2018-11-18 LAB
ALBUMIN SERPL BCP-MCNC: 3.9 G/DL (ref 3.5–5)
ALP SERPL-CCNC: 77 U/L (ref 46–116)
ALT SERPL W P-5'-P-CCNC: 56 U/L (ref 12–78)
ANION GAP SERPL CALCULATED.3IONS-SCNC: 11 MMOL/L (ref 4–13)
AST SERPL W P-5'-P-CCNC: 21 U/L (ref 5–45)
BASOPHILS # BLD AUTO: 0.03 THOUSANDS/ΜL (ref 0–0.1)
BASOPHILS NFR BLD AUTO: 0 % (ref 0–1)
BILIRUB SERPL-MCNC: 1.2 MG/DL (ref 0.2–1)
BUN SERPL-MCNC: 12 MG/DL (ref 5–25)
CALCIUM SERPL-MCNC: 9.5 MG/DL (ref 8.3–10.1)
CHLORIDE SERPL-SCNC: 100 MMOL/L (ref 100–108)
CO2 SERPL-SCNC: 28 MMOL/L (ref 21–32)
CREAT SERPL-MCNC: 0.75 MG/DL (ref 0.6–1.3)
EOSINOPHIL # BLD AUTO: 0.15 THOUSAND/ΜL (ref 0–0.61)
EOSINOPHIL NFR BLD AUTO: 1 % (ref 0–6)
ERYTHROCYTE [DISTWIDTH] IN BLOOD BY AUTOMATED COUNT: 11.5 % (ref 11.6–15.1)
GFR SERPL CREATININE-BSD FRML MDRD: 99 ML/MIN/1.73SQ M
GLUCOSE SERPL-MCNC: 293 MG/DL (ref 65–140)
HCT VFR BLD AUTO: 49.2 % (ref 34.8–46.1)
HGB BLD-MCNC: 17.8 G/DL (ref 11.5–15.4)
HOLD SPECIMEN: NORMAL
IMM GRANULOCYTES # BLD AUTO: 0.06 THOUSAND/UL (ref 0–0.2)
IMM GRANULOCYTES NFR BLD AUTO: 0 % (ref 0–2)
LIPASE SERPL-CCNC: 133 U/L (ref 73–393)
LYMPHOCYTES # BLD AUTO: 1.45 THOUSANDS/ΜL (ref 0.6–4.47)
LYMPHOCYTES NFR BLD AUTO: 10 % (ref 14–44)
MCH RBC QN AUTO: 33.3 PG (ref 26.8–34.3)
MCHC RBC AUTO-ENTMCNC: 36.2 G/DL (ref 31.4–37.4)
MCV RBC AUTO: 92 FL (ref 82–98)
MONOCYTES # BLD AUTO: 0.83 THOUSAND/ΜL (ref 0.17–1.22)
MONOCYTES NFR BLD AUTO: 6 % (ref 4–12)
NEUTROPHILS # BLD AUTO: 11.39 THOUSANDS/ΜL (ref 1.85–7.62)
NEUTS SEG NFR BLD AUTO: 83 % (ref 43–75)
NRBC BLD AUTO-RTO: 0 /100 WBCS
PLATELET # BLD AUTO: 246 THOUSANDS/UL (ref 149–390)
PMV BLD AUTO: 10.8 FL (ref 8.9–12.7)
POTASSIUM SERPL-SCNC: 4.2 MMOL/L (ref 3.5–5.3)
PROT SERPL-MCNC: 8.3 G/DL (ref 6.4–8.2)
RBC # BLD AUTO: 5.35 MILLION/UL (ref 3.81–5.12)
SODIUM SERPL-SCNC: 139 MMOL/L (ref 136–145)
WBC # BLD AUTO: 13.91 THOUSAND/UL (ref 4.31–10.16)

## 2018-11-18 PROCEDURE — 96376 TX/PRO/DX INJ SAME DRUG ADON: CPT

## 2018-11-18 PROCEDURE — 74177 CT ABD & PELVIS W/CONTRAST: CPT

## 2018-11-18 PROCEDURE — 96375 TX/PRO/DX INJ NEW DRUG ADDON: CPT

## 2018-11-18 PROCEDURE — 83690 ASSAY OF LIPASE: CPT | Performed by: EMERGENCY MEDICINE

## 2018-11-18 PROCEDURE — 99284 EMERGENCY DEPT VISIT MOD MDM: CPT

## 2018-11-18 PROCEDURE — 85025 COMPLETE CBC W/AUTO DIFF WBC: CPT | Performed by: EMERGENCY MEDICINE

## 2018-11-18 PROCEDURE — 80053 COMPREHEN METABOLIC PANEL: CPT | Performed by: EMERGENCY MEDICINE

## 2018-11-18 PROCEDURE — 36415 COLL VENOUS BLD VENIPUNCTURE: CPT

## 2018-11-18 PROCEDURE — 96374 THER/PROPH/DIAG INJ IV PUSH: CPT

## 2018-11-18 PROCEDURE — 96361 HYDRATE IV INFUSION ADD-ON: CPT

## 2018-11-18 PROCEDURE — C9113 INJ PANTOPRAZOLE SODIUM, VIA: HCPCS | Performed by: EMERGENCY MEDICINE

## 2018-11-18 RX ORDER — KETOROLAC TROMETHAMINE 30 MG/ML
15 INJECTION, SOLUTION INTRAMUSCULAR; INTRAVENOUS ONCE
Status: COMPLETED | OUTPATIENT
Start: 2018-11-18 | End: 2018-11-18

## 2018-11-18 RX ORDER — DICYCLOMINE HCL 20 MG
20 TABLET ORAL ONCE
Status: COMPLETED | OUTPATIENT
Start: 2018-11-18 | End: 2018-11-18

## 2018-11-18 RX ORDER — METOCLOPRAMIDE HYDROCHLORIDE 5 MG/ML
10 INJECTION INTRAMUSCULAR; INTRAVENOUS ONCE
Status: COMPLETED | OUTPATIENT
Start: 2018-11-18 | End: 2018-11-18

## 2018-11-18 RX ORDER — ONDANSETRON 4 MG/1
4 TABLET, ORALLY DISINTEGRATING ORAL EVERY 6 HOURS PRN
Qty: 20 TABLET | Refills: 0 | Status: SHIPPED | OUTPATIENT
Start: 2018-11-18 | End: 2020-09-16 | Stop reason: ALTCHOICE

## 2018-11-18 RX ORDER — PANTOPRAZOLE SODIUM 40 MG/1
40 INJECTION, POWDER, FOR SOLUTION INTRAVENOUS ONCE
Status: COMPLETED | OUTPATIENT
Start: 2018-11-18 | End: 2018-11-18

## 2018-11-18 RX ORDER — ONDANSETRON 2 MG/ML
4 INJECTION INTRAMUSCULAR; INTRAVENOUS ONCE
Status: COMPLETED | OUTPATIENT
Start: 2018-11-18 | End: 2018-11-18

## 2018-11-18 RX ORDER — PANTOPRAZOLE SODIUM 40 MG/1
40 TABLET, DELAYED RELEASE ORAL DAILY
Qty: 30 TABLET | Refills: 0 | Status: SHIPPED | OUTPATIENT
Start: 2018-11-18 | End: 2020-09-16 | Stop reason: ALTCHOICE

## 2018-11-18 RX ORDER — DICYCLOMINE HCL 20 MG
20 TABLET ORAL
Qty: 60 TABLET | Refills: 0 | Status: SHIPPED | OUTPATIENT
Start: 2018-11-18 | End: 2020-09-18 | Stop reason: ALTCHOICE

## 2018-11-18 RX ADMIN — IOHEXOL 100 ML: 350 INJECTION, SOLUTION INTRAVENOUS at 03:42

## 2018-11-18 RX ADMIN — METOCLOPRAMIDE 10 MG: 5 INJECTION, SOLUTION INTRAMUSCULAR; INTRAVENOUS at 07:20

## 2018-11-18 RX ADMIN — KETOROLAC TROMETHAMINE 15 MG: 30 INJECTION, SOLUTION INTRAMUSCULAR at 08:11

## 2018-11-18 RX ADMIN — KETOROLAC TROMETHAMINE 15 MG: 30 INJECTION, SOLUTION INTRAMUSCULAR at 04:01

## 2018-11-18 RX ADMIN — ONDANSETRON 4 MG: 2 INJECTION INTRAMUSCULAR; INTRAVENOUS at 04:01

## 2018-11-18 RX ADMIN — ONDANSETRON 4 MG: 2 INJECTION INTRAMUSCULAR; INTRAVENOUS at 06:07

## 2018-11-18 RX ADMIN — DICYCLOMINE HYDROCHLORIDE 20 MG: 20 TABLET ORAL at 05:37

## 2018-11-18 RX ADMIN — SODIUM CHLORIDE 1000 ML: 0.9 INJECTION, SOLUTION INTRAVENOUS at 04:01

## 2018-11-18 RX ADMIN — PANTOPRAZOLE SODIUM 40 MG: 40 INJECTION, POWDER, FOR SOLUTION INTRAVENOUS at 06:07

## 2018-11-18 NOTE — DISCHARGE INSTRUCTIONS
Taking medications as prescribed  Follow up with the primary care doctor and a GI doctor for further evaluation and management of her symptoms  Return for worsening or changing symptoms, inability to tolerate fluids, or for any other concerns  Enteritis   WHAT YOU NEED TO KNOW:   Enteritis is inflammation of the small intestine  It may be caused by eating foods or drinking liquids contaminated with a virus, bacteria, or parasites  It may also be caused by certain medicines, damage from radiation, and medical conditions such as Crohn disease  DISCHARGE INSTRUCTIONS:   Return to the emergency department if:   · You cannot stop vomiting  · You have not urinated for 12 hours  Contact your healthcare provider if:   · You have a fever over 101 5  · You have blood or mucus in your bowel movements  · You continue to vomit or have diarrhea for more than 3 days, even after treatment  · You have a dry mouth and eyes, you are urinating less than usual, and you feel dizzy when you stand up  · Your mouth or eyes are dry  You are not urinating as much or as often  · You are losing weight without trying  · You have questions or concerns about your condition or care  Medicines:   · Medicines  may be given to fight an infection caused by bacteria or a parasite  You may also need medicines to slow or stop your diarrhea or vomiting  Do not take these medicines unless your healthcare provider say it is okay  Other medicines may be needed to treat medical conditions that are causing enteritis  · Take your medicine as directed  Contact your healthcare provider if you think your medicine is not helping or if you have side effects  Tell him of her if you are allergic to any medicine  Keep a list of the medicines, vitamins, and herbs you take  Include the amounts, and when and why you take them  Bring the list or the pill bottles to follow-up visits   Carry your medicine list with you in case of an emergency  Manage enteritis:   · Eat foods that help to decrease symptoms  Limit or avoid foods and liquids that are high in sugar, fat, and fiber to help relieve diarrhea  It may be helpful to avoid lactose  Lactose is a type of sugar that is found in milk products  You may be able to tolerate soups, broths, well-cooked vegetables, canned fruit, and baked or broiled meats  Ask your dietitian or healthcare provider if you should follow a special diet  You may need to avoid other foods if you have certain medical conditions such as celiac disease  · Drink liquids as directed  Ask how much liquid to drink each day and which liquids are best for you  It is important to prevent or treat dehydration  Even if you have been vomiting, suck on ice chips or take small sips of clear liquids often  Slowly increase the amount of clear liquids you drink  If you become dehydrated, you may need IV liquids  · Drink an oral rehydration solution (ORS) as directed  An ORS contains water, salts, and sugar that are needed to replace lost body fluids  Ask what kind of ORS to use, how much to drink, and where to get it  Prevent enteritis:  Enteritis that is caused by bacteria, parasites, or viruses can be prevented  The following may help to prevent this type of enteritis:  · Wash your hands often  Use soap and water  Wash your hands after you use the bathroom, change a child's diapers, or sneeze  Wash your hands before you prepare or eat food  · Clean surfaces and do laundry often  Wash your clothes and towels separately from the rest of the laundry  Clean surfaces in your home with antibacterial  or bleach  · Clean food thoroughly and cook safely  Wash raw vegetables before you cook  Cook meat, fish, and eggs fully  Do not use the same dishes for raw meat as you do for other foods  Refrigerate any leftover food immediately  · Be aware when you camp or travel  Drink only clean water   Do not drink from rivers or lakes unless you purify or boil the water first  When you travel, drink bottled water and do not add ice  Do not eat fruit that has not been peeled  Do not eat raw fish or meat that is not fully cooked  Follow up with your healthcare provider as directed:  Write down your questions so you remember to ask them during your visits  © 2017 [employer] [employer]  Information is for End User's use only and may not be sold, redistributed or otherwise used for commercial purposes  All illustrations and images included in CareNotes® are the copyrighted property of [employer]  or [employer]  The above information is an  only  It is not intended as medical advice for individual conditions or treatments  Talk to your doctor, nurse or pharmacist before following any medical regimen to see if it is safe and effective for you

## 2018-11-18 NOTE — ED PROVIDER NOTES
History  Chief Complaint   Patient presents with    Abdominal Pain     Pt states that she was seen a couple weeks ago for colitis  Pt states that she had pain when she went home  Pt states that she has a follow up appt the fist week of December but the pain has gotten worse and pt had vomiting and diarrhea that started today  HPI    Prior to Admission Medications   Prescriptions Last Dose Informant Patient Reported? Taking?    Blood Glucose Monitoring Suppl (ONE TOUCH ULTRA 2) w/Device KIT 11/17/2018 at Unknown time  Yes Yes   Sig: as directed   Lancets (ONETOUCH ULTRASOFT) lancets 11/17/2018 at Unknown time  Yes Yes   Sig: uncontrolled DM, hyperglycemia, hypertension, test 4 times daily, 250 02 150/month   albuterol (PROVENTIL HFA,VENTOLIN HFA) 90 mcg/act inhaler 11/17/2018 at Unknown time  Yes Yes   Sig: Inhale   amitriptyline (ELAVIL) 50 mg tablet 11/17/2018 at Unknown time  Yes Yes   Sig: Take by mouth   aspirin 81 MG tablet 11/17/2018 at Unknown time  Yes Yes   Sig: Take by mouth   atorvastatin (LIPITOR) 80 mg tablet 11/17/2018 at Unknown time  Yes Yes   Sig: One pill by mouth once a day   dicyclomine (BENTYL) 20 mg tablet 11/17/2018 at Unknown time  No Yes   Sig: Take 1 tablet (20 mg total) by mouth 4 (four) times a day (before meals and at bedtime)   fluticasone-salmeterol (ADVAIR DISKUS) 500-50 mcg/dose inhaler 11/17/2018 at Unknown time  Yes Yes   Sig: Inhale   glucose blood test strip 11/17/2018 at Unknown time  Yes Yes   Sig: TEST BLOOD SUGARS 3 TIMES DAILY   insulin glargine (LANTUS SOLOSTAR) 100 units/mL injection pen 11/17/2018 at Unknown time  Yes Yes   Sig: Inject under the skin   insulin lispro (HumaLOG) 100 units/mL injection pen 11/17/2018 at Unknown time  Yes Yes   Sig: Inject 12 Units under the skin   losartan (COZAAR) 25 mg tablet 11/17/2018 at Unknown time  Yes Yes   Sig: One pill by mouth once a day   metFORMIN (GLUCOPHAGE) 1000 MG tablet 11/17/2018 at Unknown time  No Yes   Sig: Take 1 tablet (1,000 mg total) by mouth 2 (two) times a day with meals   montelukast (SINGULAIR) 10 mg tablet   Yes No   Sig: One pill by mouth once a day      Facility-Administered Medications: None       Past Medical History:   Diagnosis Date    Asthma     Diabetes mellitus ([facility] [address] )     Fibromyalgia     Hyperlipidemia        Past Surgical History:   Procedure Laterality Date     SECTION      CHOLECYSTECTOMY      COLONOSCOPY N/A 10/24/2018    Procedure: COLONOSCOPY;  Surgeon: Arlette Mcintyre MD;  Location: [facility]; Service: Gastroenterology    ESOPHAGOGASTRODUODENOSCOPY N/A 10/26/2018    Procedure: ESOPHAGOGASTRODUODENOSCOPY (EGD); Surgeon: Arlette Mcintyre MD;  Location: [facility]; Service: Gastroenterology    TUBAL LIGATION         History reviewed  No pertinent family history  I have reviewed and agree with the history as documented  Social History   Substance Use Topics    Smoking status: Never Smoker    Smokeless tobacco: Never Used    Alcohol use No        Review of Systems    Physical Exam  Physical Exam   Constitutional: She is oriented to person, place, and time  She appears well-developed and well-nourished  No distress  HENT:   Head: Normocephalic and atraumatic  Mouth/Throat: Oropharynx is clear and moist    Eyes: Pupils are equal, round, and reactive to light  Conjunctivae are normal    Neck: Normal range of motion  No tracheal deviation present  Cardiovascular: Normal rate, regular rhythm, normal heart sounds and intact distal pulses  Pulmonary/Chest: Effort normal and breath sounds normal  No respiratory distress  Abdominal: Soft  Bowel sounds are normal  She exhibits no distension  There is generalized tenderness  There is no rigidity, no rebound, no guarding and no CVA tenderness  Neurological: She is alert and oriented to person, place, and time  She has normal strength  GCS eye subscore is 4  GCS verbal subscore is 5  GCS motor subscore is 6     Skin: Skin is warm and dry    Psychiatric: She has a normal mood and affect  Her behavior is normal    Nursing note and vitals reviewed        Vital Signs  ED Triage Vitals [11/18/18 0210]   Temperature Pulse Respirations Blood Pressure SpO2   97 7 °F (36 5 °C) 90 20 114/80 97 %      Temp Source Heart Rate Source Patient Position - Orthostatic VS BP Location FiO2 (%)   Oral Monitor Lying Right arm --      Pain Score       Worst Possible Pain           Vitals:    11/18/18 0210 11/18/18 0530 11/18/18 0615 11/18/18 0745   BP: 114/80 105/69 96/59 (!) 85/52   Pulse: 90 86 84 77   Patient Position - Orthostatic VS: Lying Sitting Lying        Visual Acuity      ED Medications  Medications   ketorolac (TORADOL) injection 15 mg (15 mg Intravenous Given 11/18/18 0401)   ondansetron (ZOFRAN) injection 4 mg (4 mg Intravenous Given 11/18/18 0401)   sodium chloride 0 9 % bolus 1,000 mL (0 mL Intravenous Stopped 11/18/18 0538)   iohexol (OMNIPAQUE) 350 MG/ML injection (MULTI-DOSE) 100 mL (100 mL Intravenous Given 11/18/18 0342)   dicyclomine (BENTYL) tablet 20 mg (20 mg Oral Given 11/18/18 0537)   ondansetron (ZOFRAN) injection 4 mg (4 mg Intravenous Given 11/18/18 0607)   pantoprazole (PROTONIX) injection 40 mg (40 mg Intravenous Given 11/18/18 0607)   metoclopramide (REGLAN) injection 10 mg (10 mg Intravenous Given 11/18/18 0720)   ketorolac (TORADOL) injection 15 mg (15 mg Intravenous Given 11/18/18 0811)       Diagnostic Studies  Results Reviewed     Procedure Component Value Units Date/Time    Comprehensive metabolic panel [[id number]]  (Abnormal) Collected:  11/18/18 0304    Lab Status:  Final result Specimen:  Blood from Arm, Right Updated:  11/18/18 0328     Sodium 139 mmol/L      Potassium 4 2 mmol/L      Chloride 100 mmol/L      CO2 28 mmol/L      ANION GAP 11 mmol/L      BUN 12 mg/dL      Creatinine 0 75 mg/dL      Glucose 293 (H) mg/dL      Calcium 9 5 mg/dL      AST 21 U/L      ALT 56 U/L      Alkaline Phosphatase 77 U/L      Total Protein 8 3 (H) g/dL      Albumin 3 9 g/dL      Total Bilirubin 1 20 (H) mg/dL      eGFR 99 ml/min/1 73sq m     Narrative:         National Kidney Disease Education Program recommendations are as follows:  GFR calculation is accurate only with a steady state creatinine  Chronic Kidney disease less than 60 ml/min/1 73 sq  meters  Kidney failure less than 15 ml/min/1 73 sq  meters  Lipase [[id number]]  (Normal) Collected:  11/18/18 0304    Lab Status:  Final result Specimen:  Blood from Arm, Right Updated:  11/18/18 0328     Lipase 133 u/L     CBC and differential [[id number]]  (Abnormal) Collected:  11/18/18 0304    Lab Status:  Final result Specimen:  Blood from Arm, Right Updated:  11/18/18 0311     WBC 13 91 (H) Thousand/uL      RBC 5 35 (H) Million/uL      Hemoglobin 17 8 (H) g/dL      Hematocrit 49 2 (H) %      MCV 92 fL      MCH 33 3 pg      MCHC 36 2 g/dL      RDW 11 5 (L) %      MPV 10 8 fL      Platelets 675 Thousands/uL      nRBC 0 /100 WBCs      Neutrophils Relative 83 (H) %      Immat GRANS % 0 %      Lymphocytes Relative 10 (L) %      Monocytes Relative 6 %      Eosinophils Relative 1 %      Basophils Relative 0 %      Neutrophils Absolute 11 39 (H) Thousands/µL      Immature Grans Absolute 0 06 Thousand/uL      Lymphocytes Absolute 1 45 Thousands/µL      Monocytes Absolute 0 83 Thousand/µL      Eosinophils Absolute 0 15 Thousand/µL      Basophils Absolute 0 03 Thousands/µL                  CT abdomen pelvis with contrast   Final Result by Ayah Nickerson DO (11/18 9624)      Multiple fluid-filled and mildly prominent loops of small bowel with some associated bowel wall hyperenhancement and bowel wall thickening as described with some liquid stool in the proximal colon  Nonspecific but the findings taken together are    suspicious for an infectious or inflammatory enteritis  Correlation with the patient's symptoms recommended        A thin linear metallic foreign body is seen in the cecum (axial image 55, series 2), correlate with patient's recent ingestion  Suspected hepatic hemangiomas, and other findings as above  Workstation performed: [id number]                    Procedures  Procedures       Phone Contacts  ED Phone Contact    ED Course                               MDM  Number of Diagnoses or Management Options  Diagnosis management comments: This is a 71-year-old female who presents here today with abdominal pain, nausea, vomiting, and diarrhea  She states she was admitted last month for colitis and did have some improvement prior to discharge  However, she has been continuing to have some pain, as well as occasional diarrhea but less  She says yesterday the symptoms acutely worsen, having worse pain similar to when she came in, as well as three episodes of vomiting, and 3-4 episodes of diarrhea  She denies fevers or blood in her stool  She has been able to tolerate liquids only  She has not taken or done anything for her symptoms  She did have a cholecystectomy but denies other abdominal surgeries  She states she had an endoscopy and colonoscopy while she was in the hospital with polyp removal but no other acute abnormalities  She denies any URI symptoms, chest pain, trouble breathing, other complaints  She denies any known sick contacts or bad food exposures  ROS: Otherwise negative, unless stated as above  She is well-appearing, in no acute distress  She does have mild diffuse abdominal tenderness without peritoneal signs  She does appear anxious  Concern is for recurrent infection verses worsening of the initial infection, with potential development of perforation or intra-abdominal abscess  Based on the notes from her last visit, 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  We will repeat lab work and CT scan here to evaluate, and treat her symptoms      The CT scan shows enteritis but no other acute abnormalities, and on discussion with the radiologist does appear slightly improved from prior  She does have a mild leukocytosis as well as polycythemia, which is likely due to hemoconcentration  She does have hyperglycemia and is a known diabetic  I did take several doses of medications for both pain and nausea to control her symptoms, however she did report improvement of her pain as well as her nausea, and is able to tolerate fluids without difficulties or recurrent symptoms  She will be discharged home to follow up with GI  Amount and/or Complexity of Data Reviewed  Clinical lab tests: ordered and reviewed  Tests in the radiology section of CPT®: ordered and reviewed  Independent visualization of images, tracings, or specimens: yes      CritCare Time    Disposition  Final diagnoses:   Abdominal pain   Nausea vomiting and diarrhea   Enteritis   Gastritis     Time reflects when diagnosis was documented in both MDM as applicable and the Disposition within this note     Time User Action Codes Description Comment    11/18/2018  8:37 AM Deven Drake Add [R10 9] Abdominal pain     11/18/2018  8:37 AM Deven Drake Add [R11 2,  R19 7] Nausea vomiting and diarrhea     11/18/2018  8:37 AM Deven Drake Add [K52 9] Enteritis     11/18/2018  8:37 AM Deven Drake Add [K29 70] Gastritis       ED Disposition     ED Disposition Condition Comment    Discharge  Jeni Ifeoma Rafael discharge to home/self care      Condition at discharge: Good        Follow-up Information     Follow up With Specialties Details Why Contact Info Additional Information    Mohini Waller PA-C  Schedule an appointment as soon as possible for a visit to follow up on your symptoms [address] [place] [address] [id number] [place] [address] [facility]  [facility] Go on 12/11/2018 as scheduled Mike Mcbride [phone] [facility] [place], [address] [place], [place], [phone]          Patient's Medications   Discharge Prescriptions    ONDANSETRON (ZOFRAN-ODT) 4 MG DISINTEGRATING TABLET    Take 1 tablet (4 mg total) by mouth every 6 (six) hours as needed for nausea or vomiting       Start Date: 11/18/2018End Date: --       Order Dose: 4 mg       Quantity: 20 tablet    Refills: 0    PANTOPRAZOLE (PROTONIX) 40 MG TABLET    Take 1 tablet (40 mg total) by mouth daily       Start Date: 11/18/2018End Date: --       Order Dose: 40 mg       Quantity: 30 tablet    Refills: 0     No discharge procedures on file      ED Provider  Electronically Signed by           Anette Shay MD  11/18/18 4445

## 2020-02-02 ENCOUNTER — HOSPITAL ENCOUNTER (EMERGENCY)
Facility: HOSPITAL | Age: 45
Discharge: HOME | End: 2020-02-03
Attending: EMERGENCY MEDICINE | Admitting: EMERGENCY MEDICINE
Payer: OTHER GOVERNMENT

## 2020-02-02 DIAGNOSIS — R10.9 ABDOMINAL PAIN: Primary | ICD-10-CM

## 2020-02-02 DIAGNOSIS — R73.9 HYPERGLYCEMIA: ICD-10-CM

## 2020-02-02 PROCEDURE — 99284 EMERGENCY DEPT VISIT MOD MDM: CPT

## 2020-02-03 ENCOUNTER — APPOINTMENT (EMERGENCY)
Dept: CT IMAGING | Facility: HOSPITAL | Age: 45
End: 2020-02-03
Payer: OTHER GOVERNMENT

## 2020-02-03 VITALS
RESPIRATION RATE: 16 BRPM | DIASTOLIC BLOOD PRESSURE: 70 MMHG | OXYGEN SATURATION: 99 % | HEART RATE: 88 BPM | WEIGHT: 150 LBS | SYSTOLIC BLOOD PRESSURE: 118 MMHG | HEIGHT: 64 IN | BODY MASS INDEX: 25.61 KG/M2 | TEMPERATURE: 98 F

## 2020-02-03 PROBLEM — R73.9 HYPERGLYCEMIA: Status: ACTIVE | Noted: 2020-02-03

## 2020-02-03 LAB
ALBUMIN SERPL BCP-MCNC: 3.6 G/DL (ref 3.5–5)
ALP SERPL-CCNC: 74 U/L (ref 46–116)
ALT SERPL W P-5'-P-CCNC: 28 U/L (ref 12–78)
ANION GAP SERPL CALCULATED.3IONS-SCNC: 9 MMOL/L (ref 4–13)
AST SERPL W P-5'-P-CCNC: 12 U/L (ref 5–45)
BASOPHILS # BLD AUTO: 0.03 THOUSANDS/ΜL (ref 0–0.1)
BASOPHILS NFR BLD AUTO: 0 % (ref 0–1)
BILIRUB SERPL-MCNC: 1.1 MG/DL (ref 0.2–1)
BILIRUB UR QL STRIP: NEGATIVE
BUN SERPL-MCNC: 12 MG/DL (ref 5–25)
CALCIUM SERPL-MCNC: 9.2 MG/DL (ref 8.3–10.1)
CHLORIDE SERPL-SCNC: 99 MMOL/L (ref 100–108)
CLARITY UR: CLEAR
CO2 SERPL-SCNC: 28 MMOL/L (ref 21–32)
COLOR UR: YELLOW
CREAT SERPL-MCNC: 0.84 MG/DL (ref 0.6–1.3)
EOSINOPHIL # BLD AUTO: 0.18 THOUSAND/ΜL (ref 0–0.61)
EOSINOPHIL NFR BLD AUTO: 2 % (ref 0–6)
ERYTHROCYTE [DISTWIDTH] IN BLOOD BY AUTOMATED COUNT: 11.5 % (ref 11.6–15.1)
FLUAV RNA NPH QL NAA+PROBE: NORMAL
FLUBV RNA NPH QL NAA+PROBE: NORMAL
GFR SERPL CREATININE-BSD FRML MDRD: 85 ML/MIN/1.73SQ M
GLUCOSE SERPL-MCNC: 300 MG/DL (ref 65–140)
GLUCOSE UR STRIP-MCNC: ABNORMAL MG/DL
HCG SERPL QL: NEGATIVE
HCT VFR BLD AUTO: 47.2 % (ref 34.8–46.1)
HGB BLD-MCNC: 17.1 G/DL (ref 11.5–15.4)
HGB UR QL STRIP.AUTO: NEGATIVE
IMM GRANULOCYTES # BLD AUTO: 0.03 THOUSAND/UL (ref 0–0.2)
IMM GRANULOCYTES NFR BLD AUTO: 0 % (ref 0–2)
KETONES UR STRIP-MCNC: NEGATIVE MG/DL
LEUKOCYTE ESTERASE UR QL STRIP: NEGATIVE
LIPASE SERPL-CCNC: 155 U/L (ref 73–393)
LYMPHOCYTES # BLD AUTO: 2.89 THOUSANDS/ΜL (ref 0.6–4.47)
LYMPHOCYTES NFR BLD AUTO: 39 % (ref 14–44)
MCH RBC QN AUTO: 33.5 PG (ref 26.8–34.3)
MCHC RBC AUTO-ENTMCNC: 36.2 G/DL (ref 31.4–37.4)
MCV RBC AUTO: 93 FL (ref 82–98)
MONOCYTES # BLD AUTO: 0.64 THOUSAND/ΜL (ref 0.17–1.22)
MONOCYTES NFR BLD AUTO: 9 % (ref 4–12)
NEUTROPHILS # BLD AUTO: 3.74 THOUSANDS/ΜL (ref 1.85–7.62)
NEUTS SEG NFR BLD AUTO: 50 % (ref 43–75)
NITRITE UR QL STRIP: NEGATIVE
NRBC BLD AUTO-RTO: 0 /100 WBCS
PH UR STRIP.AUTO: 6 [PH]
PLATELET # BLD AUTO: 277 THOUSANDS/UL (ref 149–390)
PMV BLD AUTO: 10.5 FL (ref 8.9–12.7)
POTASSIUM SERPL-SCNC: 3.5 MMOL/L (ref 3.5–5.3)
PROT SERPL-MCNC: 7.8 G/DL (ref 6.4–8.2)
PROT UR STRIP-MCNC: NEGATIVE MG/DL
RBC # BLD AUTO: 5.1 MILLION/UL (ref 3.81–5.12)
RSV RNA NPH QL NAA+PROBE: NORMAL
SODIUM SERPL-SCNC: 136 MMOL/L (ref 136–145)
SP GR UR STRIP.AUTO: 1.02 (ref 1–1.03)
UROBILINOGEN UR QL STRIP.AUTO: 0.2 E.U./DL
WBC # BLD AUTO: 7.51 THOUSAND/UL (ref 4.31–10.16)

## 2020-02-03 PROCEDURE — 96361 HYDRATE IV INFUSION ADD-ON: CPT

## 2020-02-03 PROCEDURE — 81003 URINALYSIS AUTO W/O SCOPE: CPT | Performed by: PHYSICIAN ASSISTANT

## 2020-02-03 PROCEDURE — 36415 COLL VENOUS BLD VENIPUNCTURE: CPT | Performed by: PHYSICIAN ASSISTANT

## 2020-02-03 PROCEDURE — 74177 CT ABD & PELVIS W/CONTRAST: CPT

## 2020-02-03 PROCEDURE — 83690 ASSAY OF LIPASE: CPT | Performed by: PHYSICIAN ASSISTANT

## 2020-02-03 PROCEDURE — 85025 COMPLETE CBC W/AUTO DIFF WBC: CPT | Performed by: PHYSICIAN ASSISTANT

## 2020-02-03 PROCEDURE — 99285 EMERGENCY DEPT VISIT HI MDM: CPT | Performed by: PHYSICIAN ASSISTANT

## 2020-02-03 PROCEDURE — 80053 COMPREHEN METABOLIC PANEL: CPT | Performed by: PHYSICIAN ASSISTANT

## 2020-02-03 PROCEDURE — 87631 RESP VIRUS 3-5 TARGETS: CPT | Performed by: PHYSICIAN ASSISTANT

## 2020-02-03 PROCEDURE — 84703 CHORIONIC GONADOTROPIN ASSAY: CPT | Performed by: PHYSICIAN ASSISTANT

## 2020-02-03 PROCEDURE — 96374 THER/PROPH/DIAG INJ IV PUSH: CPT

## 2020-02-03 PROCEDURE — 96375 TX/PRO/DX INJ NEW DRUG ADDON: CPT

## 2020-02-03 RX ORDER — ONDANSETRON 2 MG/ML
4 INJECTION INTRAMUSCULAR; INTRAVENOUS ONCE
Status: COMPLETED | OUTPATIENT
Start: 2020-02-03 | End: 2020-02-03

## 2020-02-03 RX ADMIN — ONDANSETRON 4 MG: 2 INJECTION INTRAMUSCULAR; INTRAVENOUS at 00:26

## 2020-02-03 RX ADMIN — MORPHINE SULFATE 2 MG: 2 INJECTION, SOLUTION INTRAMUSCULAR; INTRAVENOUS at 00:26

## 2020-02-03 RX ADMIN — SODIUM CHLORIDE 1000 ML: 0.9 INJECTION, SOLUTION INTRAVENOUS at 00:26

## 2020-02-03 RX ADMIN — IOHEXOL 100 ML: 350 INJECTION, SOLUTION INTRAVENOUS at 01:48

## 2020-02-03 NOTE — ED PROVIDER NOTES
History  Chief Complaint   Patient presents with    Abdominal Pain     Pt presents to ED with abdominal pain and diarrhea  Feels like she has polyps again    Asthma     asthma exacerbation with cough      Patient is a 71-year-old female presents emergency department complaints of abdominal pain, nausea, diarrhea  Patient states she has had similar symptoms in the past when she was diagnosed with polyps  She states her symptoms have been going on for several months but increased today  She has a fevers, chills, dizziness, bloody stool  Prior to Admission Medications   Prescriptions Last Dose Informant Patient Reported? Taking?    Blood Glucose Monitoring Suppl (ONE TOUCH ULTRA 2) w/Device KIT   Yes No   Sig: as directed   Lancets (ONETOUCH ULTRASOFT) lancets   Yes No   Sig: uncontrolled DM, hyperglycemia, hypertension, test 4 times daily, 250 02 150/month   albuterol (PROVENTIL HFA,VENTOLIN HFA) 90 mcg/act inhaler   Yes No   Sig: Inhale   amitriptyline (ELAVIL) 50 mg tablet   Yes No   Sig: Take by mouth   aspirin 81 MG tablet   Yes No   Sig: Take by mouth   atorvastatin (LIPITOR) 80 mg tablet   Yes No   Sig: One pill by mouth once a day   dicyclomine (BENTYL) 20 mg tablet   No No   Sig: Take 1 tablet (20 mg total) by mouth 4 (four) times a day (before meals and at bedtime)   fluticasone-salmeterol (ADVAIR DISKUS) 500-50 mcg/dose inhaler   Yes No   Sig: Inhale   glucose blood test strip   Yes No   Sig: TEST BLOOD SUGARS 3 TIMES DAILY   insulin glargine (LANTUS SOLOSTAR) 100 units/mL injection pen   Yes No   Sig: Inject under the skin   insulin lispro (HumaLOG) 100 units/mL injection pen   Yes No   Sig: Inject 12 Units under the skin   losartan (COZAAR) 25 mg tablet   Yes No   Sig: One pill by mouth once a day   metFORMIN (GLUCOPHAGE) 1000 MG tablet   No No   Sig: Take 1 tablet (1,000 mg total) by mouth 2 (two) times a day with meals   montelukast (SINGULAIR) 10 mg tablet   Yes No   Sig: One pill by mouth once a day   ondansetron (ZOFRAN-ODT) 4 mg disintegrating tablet   No No   Sig: Take 1 tablet (4 mg total) by mouth every 6 (six) hours as needed for nausea or vomiting   oxyCODONE (ROXICODONE) 5 mg immediate release tablet   Yes No   pantoprazole (PROTONIX) 40 mg tablet   No No   Sig: Take 1 tablet (40 mg total) by mouth daily      Facility-Administered Medications: None       Past Medical History:   Diagnosis Date    Asthma     Diabetes mellitus ([facility] [address] )     Fibromyalgia     Hyperlipidemia        Past Surgical History:   Procedure Laterality Date     SECTION      CHOLECYSTECTOMY      COLONOSCOPY N/A 10/24/2018    Procedure: COLONOSCOPY;  Surgeon: Lord Yolanda MD;  Location: [facility]; Service: Gastroenterology    ESOPHAGOGASTRODUODENOSCOPY N/A 10/26/2018    Procedure: ESOPHAGOGASTRODUODENOSCOPY (EGD); Surgeon: Lord Yolanda MD;  Location: [facility]; Service: Gastroenterology    TUBAL LIGATION         History reviewed  No pertinent family history  I have reviewed and agree with the history as documented  Social History     Tobacco Use    Smoking status: Never Smoker    Smokeless tobacco: Never Used   Substance Use Topics    Alcohol use: No    Drug use: No        Review of Systems   Constitutional: Negative for fever  Respiratory: Negative for shortness of breath  Cardiovascular: Negative for chest pain  Gastrointestinal: Positive for abdominal pain, diarrhea and nausea  All other systems reviewed and are negative  Physical Exam  Physical Exam   Constitutional: She is oriented to person, place, and time  She appears well-developed and well-nourished  HENT:   Head: Normocephalic and atraumatic  Eyes: Pupils are equal, round, and reactive to light  EOM are normal    Cardiovascular: Normal rate, regular rhythm and normal heart sounds  Pulmonary/Chest: Effort normal and breath sounds normal    Abdominal: Soft   Normal appearance and bowel sounds are normal  There is generalized tenderness  Neurological: She is alert and oriented to person, place, and time  Skin: Skin is warm  Capillary refill takes less than 2 seconds  Psychiatric: She has a normal mood and affect  Her behavior is normal    Vitals reviewed        Vital Signs  ED Triage Vitals [02/02/20 2347]   Temperature Pulse Respirations Blood Pressure SpO2   98 4 °F (36 9 °C) 95 18 110/79 97 %      Temp Source Heart Rate Source Patient Position - Orthostatic VS BP Location FiO2 (%)   Oral Monitor Sitting Right arm --      Pain Score       Worst Possible Pain           Vitals:    02/02/20 2347 02/03/20 0249   BP: 110/79 118/70   Pulse: 95 88   Patient Position - Orthostatic VS: Sitting          Visual Acuity      ED Medications  Medications   morphine injection 2 mg (2 mg Intravenous Given 2/3/20 0026)   ondansetron (ZOFRAN) injection 4 mg (4 mg Intravenous Given 2/3/20 0026)   sodium chloride 0 9 % bolus 1,000 mL (0 mL Intravenous Stopped 2/3/20 0246)   iohexol (OMNIPAQUE) 350 MG/ML injection (MULTI-DOSE) 100 mL (100 mL Intravenous Given 2/3/20 0148)       Diagnostic Studies  Results Reviewed     Procedure Component Value Units Date/Time    Influenza A/B and RSV PCR [[id number]]  (Normal) Collected:  02/03/20 0042    Lab Status:  Final result Specimen:  Nasopharyngeal Swab Updated:  02/03/20 0119     INFLUENZA A PCR None Detected     INFLUENZA B PCR None Detected     RSV PCR None Detected    Lipase [[id number]]  (Normal) Collected:  02/03/20 0031    Lab Status:  Final result Specimen:  Blood from Arm, Left Updated:  02/03/20 0106     Lipase 155 u/L     hCG, qualitative pregnancy [[id number]]  (Normal) Collected:  02/03/20 0031    Lab Status:  Final result Specimen:  Blood from Arm, Left Updated:  02/03/20 0106     Preg, Serum Negative    Comprehensive metabolic panel [[id number]]  (Abnormal) Collected:  02/03/20 0031    Lab Status:  Final result Specimen:  Blood from Arm, Left Updated:  02/03/20 0100     Sodium 136 mmol/L Potassium 3 5 mmol/L      Chloride 99 mmol/L      CO2 28 mmol/L      ANION GAP 9 mmol/L      BUN 12 mg/dL      Creatinine 0 84 mg/dL      Glucose 300 mg/dL      Calcium 9 2 mg/dL      AST 12 U/L      ALT 28 U/L      Alkaline Phosphatase 74 U/L      Total Protein 7 8 g/dL      Albumin 3 6 g/dL      Total Bilirubin 1 10 mg/dL      eGFR 85 ml/min/1 73sq m     Narrative:       National Kid[employer] Disease [employer] guidelines for Chronic Kidney Disease (CKD):     Stage 1 with normal or high GFR (GFR > 90 mL/min/1 73 square meters)    Stage 2 Mild CKD (GFR = 60-89 mL/min/1 73 square meters)    Stage 3A Moderate CKD (GFR = 45-59 mL/min/1 73 square meters)    Stage 3B Moderate CKD (GFR = 30-44 mL/min/1 73 square meters)    Stage 4 Severe CKD (GFR = 15-29 mL/min/1 73 square meters)    Stage 5 End Stage CKD (GFR <15 mL/min/1 73 square meters)  Note: GFR calculation is accurate only with a steady state creatinine    UA w Reflex to Microscopic w Reflex to Culture [[id number]]  (Abnormal) Collected:  02/03/20 0031    Lab Status:  Final result Specimen:  Urine, Clean Catch Updated:  02/03/20 0043     Color, UA Yellow     Clarity, UA Clear     Specific Gravity, UA 1 025     pH, UA 6 0     Leukocytes, UA Negative     Nitrite, UA Negative     Protein, UA Negative mg/dl      Glucose,  (1/2%) mg/dl      Ketones, UA Negative mg/dl      Urobilinogen, UA 0 2 E U /dl      Bilirubin, UA Negative     Blood, UA Negative    CBC and differential [[id number]]  (Abnormal) Collected:  02/03/20 0030    Lab Status:  Final result Specimen:  Blood from Arm, Left Updated:  02/03/20 0042     WBC 7 51 Thousand/uL      RBC 5 10 Million/uL      Hemoglobin 17 1 g/dL      Hematocrit 47 2 %      MCV 93 fL      MCH 33 5 pg      MCHC 36 2 g/dL      RDW 11 5 %      MPV 10 5 fL      Platelets 582 Thousands/uL      nRBC 0 /100 WBCs      Neutrophils Relative 50 %      Immat GRANS % 0 %      Lymphocytes Relative 39 %      Monocytes Relative 9 % Eosinophils Relative 2 %      Basophils Relative 0 %      Neutrophils Absolute 3 74 Thousands/µL      Immature Grans Absolute 0 03 Thousand/uL      Lymphocytes Absolute 2 89 Thousands/µL      Monocytes Absolute 0 64 Thousand/µL      Eosinophils Absolute 0 18 Thousand/µL      Basophils Absolute 0 03 Thousands/µL                  CT abdomen pelvis w contrast   Final Result by Darshan Cervantes MD (02/03 6893)      No acute pathology visualized on CT of the abdomen and pelvis with IV without oral contrast       Workstation performed: [id number]                    Procedures  Procedures         ED Course                               MDM  Number of Diagnoses or Management Options  Abdominal pain:   Hyperglycemia:   Diagnosis management comments: Patient is a 52-year-old female presents emergency department complaints of abdominal pain, nausea, diarrhea  Lab evaluation was performed is unremarkable  CT of the abdomen pelvis was reviewed and does not show any acute changes  Patient has had similar symptoms in past   I recommend she follow-up with gastroenterology for further evaluation and treatment recommendations  Return parameters were discussed  Patient stable for discharge         Amount and/or Complexity of Data Reviewed  Clinical lab tests: ordered and reviewed  Tests in the radiology section of CPT®: ordered and reviewed  Review and summarize past medical records: yes  Independent visualization of images, tracings, or specimens: yes    Risk of Complications, Morbidity, and/or Mortality  Presenting problems: moderate  Diagnostic procedures: moderate  Management options: moderate    Patient Progress  Patient progress: stable        Disposition  Final diagnoses:   Abdominal pain   Hyperglycemia     Time reflects when diagnosis was documented in both MDM as applicable and the Disposition within this note     Time User Action Codes Description Comment    2/3/2020  2:38 AM Elidia Bell Add [R10 9] Abdominal pain 2/3/2020  2:38 AM Rahul Velez Add [R73 9] Hyperglycemia       ED Disposition     ED Disposition Condition Date/Time Comment    Discharge Good Mon Feb 3, 2020  2:37 AM Zulema Kahn discharge to home/self care              Follow-up Information     Follow up With Specialties Details Why Contact Info Additional 255 Eliezer Ponce PA-C    [address] [facility]   [facility] [place] [id number]  [address] [facility], [facility] [address] [place] Maxine [facility], [facility], [facility] [place], [place], [address] [facility]          Discharge Medication List as of 2/3/2020  2:38 AM      CONTINUE these medications which have NOT CHANGED    Details   albuterol (PROVENTIL HFA,VENTOLIN HFA) 90 mcg/act inhaler Inhale, Starting Mon 6/18/2018, Historical Med      amitriptyline (ELAVIL) 50 mg tablet Take by mouth, Starting Thu 6/14/2018, Historical Med      aspirin 81 MG tablet Take by mouth, Starting Thu 9/20/2018, Historical Med      atorvastatin (LIPITOR) 80 mg tablet One pill by mouth once a day, Historical Med      Blood Glucose Monitoring Suppl (ONE TOUCH ULTRA 2) w/Device KIT as directed, Historical Med      dicyclomine (BENTYL) 20 mg tablet Take 1 tablet (20 mg total) by mouth 4 (four) times a day (before meals and at bedtime), Starting Sun 11/18/2018, Print      fluticasone-salmeterol (ADVAIR DISKUS) 500-50 mcg/dose inhaler Inhale, Starting Wed 12/24/2014, Historical Med      glucose blood test strip TEST BLOOD SUGARS 3 TIMES DAILY, Historical Med      insulin glargine (LANTUS SOLOSTAR) 100 units/mL injection pen Inject under the skin, Starting Thu 6/14/2018, Historical Med      insulin lispro (HumaLOG) 100 units/mL injection pen Inject 12 Units under the skin, Starting Thu 9/20/2018, Historical Med      [facility] ULTRASOFT) lancets uncontrolled DM, hyperglycemia, hypertension, test 4 times daily, 250 02 150/month, Historical Med      losartan (COZAAR) 25 mg tablet One pill by mouth once a day, Historical Med      metFORMIN (GLUCOPHAGE) 1000 MG tablet Take 1 tablet (1,000 mg total) by mouth 2 (two) times a day with meals, Starting Sun 3/4/2018, Print      montelukast (SINGULAIR) 10 mg tablet One pill by mouth once a day, Historical Med      ondansetron (ZOFRAN-ODT) 4 mg disintegrating tablet Take 1 tablet (4 mg total) by mouth every 6 (six) hours as needed for nausea or vomiting, Starting Sun 11/18/2018, Print      oxyCODONE (ROXICODONE) 5 mg immediate release tablet Starting Sat 10/27/2018, Historical Med      pantoprazole (PROTONIX) 40 mg tablet Take 1 tablet (40 mg total) by mouth daily, Starting Sun 11/18/2018, Print           No discharge procedures on file      ED Provider  Electronically Signed by           Lawson Reese PA-C  02/03/20 6639

## 2020-09-16 ENCOUNTER — OFFICE VISIT (OUTPATIENT)
Dept: FAMILY MEDICINE CLINIC | Facility: CLINIC | Age: 45
End: 2020-09-16
Payer: OTHER GOVERNMENT

## 2020-09-16 VITALS
HEART RATE: 96 BPM | WEIGHT: 152 LBS | BODY MASS INDEX: 25.95 KG/M2 | TEMPERATURE: 96 F | SYSTOLIC BLOOD PRESSURE: 122 MMHG | HEIGHT: 64 IN | DIASTOLIC BLOOD PRESSURE: 70 MMHG | OXYGEN SATURATION: 96 %

## 2020-09-16 DIAGNOSIS — Z23 ENCOUNTER FOR IMMUNIZATION: ICD-10-CM

## 2020-09-16 DIAGNOSIS — Z79.4 TYPE 2 DIABETES MELLITUS WITH HYPERGLYCEMIA, WITH LONG-TERM CURRENT USE OF INSULIN (HCC): ICD-10-CM

## 2020-09-16 DIAGNOSIS — K21.9 GASTROESOPHAGEAL REFLUX DISEASE, ESOPHAGITIS PRESENCE NOT SPECIFIED: ICD-10-CM

## 2020-09-16 DIAGNOSIS — K29.70 GASTRITIS: ICD-10-CM

## 2020-09-16 DIAGNOSIS — J30.2 SEASONAL ALLERGIES: ICD-10-CM

## 2020-09-16 DIAGNOSIS — M79.7 FIBROMYALGIA: ICD-10-CM

## 2020-09-16 DIAGNOSIS — N92.6 IRREGULAR MENSES: ICD-10-CM

## 2020-09-16 DIAGNOSIS — J45.40 MODERATE PERSISTENT ASTHMA WITHOUT COMPLICATION: ICD-10-CM

## 2020-09-16 DIAGNOSIS — Z12.31 ENCOUNTER FOR SCREENING MAMMOGRAM FOR BREAST CANCER: ICD-10-CM

## 2020-09-16 DIAGNOSIS — Z00.00 ANNUAL PHYSICAL EXAM: Primary | ICD-10-CM

## 2020-09-16 DIAGNOSIS — E11.65 TYPE 2 DIABETES MELLITUS WITH HYPERGLYCEMIA, WITH LONG-TERM CURRENT USE OF INSULIN (HCC): ICD-10-CM

## 2020-09-16 PROCEDURE — 90682 RIV4 VACC RECOMBINANT DNA IM: CPT

## 2020-09-16 PROCEDURE — 90471 IMMUNIZATION ADMIN: CPT

## 2020-09-16 PROCEDURE — 99204 OFFICE O/P NEW MOD 45 MIN: CPT | Performed by: FAMILY MEDICINE

## 2020-09-16 PROCEDURE — 83036 HEMOGLOBIN GLYCOSYLATED A1C: CPT | Performed by: FAMILY MEDICINE

## 2020-09-16 RX ORDER — FAMOTIDINE 20 MG/1
20 TABLET, FILM COATED ORAL 2 TIMES DAILY
Qty: 60 TABLET | Refills: 2 | Status: SHIPPED | OUTPATIENT
Start: 2020-09-16 | End: 2022-02-02

## 2020-09-16 RX ORDER — AMITRIPTYLINE HYDROCHLORIDE 50 MG/1
50 TABLET, FILM COATED ORAL
Qty: 30 TABLET | Refills: 2 | Status: SHIPPED | OUTPATIENT
Start: 2020-09-16 | End: 2021-08-02 | Stop reason: SDUPTHER

## 2020-09-16 RX ORDER — MONTELUKAST SODIUM 10 MG/1
10 TABLET ORAL
Qty: 30 TABLET | Refills: 2 | Status: SHIPPED | OUTPATIENT
Start: 2020-09-16

## 2020-09-16 RX ORDER — ALBUTEROL SULFATE 90 UG/1
1 AEROSOL, METERED RESPIRATORY (INHALATION) EVERY 4 HOURS PRN
Qty: 1 INHALER | Refills: 2 | Status: SHIPPED | OUTPATIENT
Start: 2020-09-16 | End: 2021-08-30 | Stop reason: SDUPTHER

## 2020-09-16 RX ORDER — ATORVASTATIN CALCIUM 40 MG/1
40 TABLET, FILM COATED ORAL
Qty: 30 TABLET | Refills: 2 | Status: SHIPPED | OUTPATIENT
Start: 2020-09-16

## 2020-09-16 NOTE — PROGRESS NOTES
[facility] [address] [facility] [address] [facility]    NAME: Shonda Gary  AGE: 41 y o  SEX: female  : 1975     DATE: 2020     Assessment and Plan:     Problem List Items Addressed This Visit        Endocrine    Type 2 diabetes mellitus with hyperglycemia, with long-term current use of insulin ([address] )    Hgb A1c in office of 9 6 Will restart Metformin at 1000 mg BID and have patient record blood sugars for 2 weeks  Continue with dietary modifications  She is need an additional medication, hopefully a GLP-1 will be covered by insurance  If not, will look into basal insulin coverage  Labs ordered to assess lipids and  microalbuminuria  On statin       Relevant Medications    metFORMIN (GLUCOPHAGE) 1000 MG tablet    atorvastatin (LIPITOR) 40 mg tablet    Other Relevant Orders    POCT hemoglobin A1c    Microalbumin / creatinine urine ratio    Lipid panel    Ambulatory referral to Ophthalmology    CBC and differential    Comprehensive metabolic panel       Respiratory    Moderate persistent asthma without complication    Relevant Medications    montelukast (SINGULAIR) 10 mg tablet    fluticasone-salmeterol (Advair Diskus) 500-50 mcg/dose inhaler    albuterol (PROVENTIL HFA,VENTOLIN HFA) 90 mcg/act inhaler       Other    Seasonal allergies    Relevant Medications    montelukast (SINGULAIR) 10 mg tablet      Other Visit Diagnoses     Annual physical exam    -  Primary    Encounter for screening mammogram for breast cancer        Relevant Orders    Mammo diagnostic bilateral w cad    Gastritis        Relevant Medications    famotidine (PEPCID) 20 mg tablet    Irregular menses        Relevant Orders    Ambulatory referral to Obstetrics / Gynecology    Fibromyalgia        Relevant Medications    amitriptyline (ELAVIL) 50 mg tablet    Gastroesophageal reflux disease, esophagitis presence not specified        Relevant Medications    famotidine (PEPCID) 20 mg tablet    Encounter for immunization        Relevant Orders    influenza vaccine, quadrivalent, recombinant, PF, 0 5 mL, for patients 18 yr+ (FLUBLOK) (Completed)        Immunizations and preventive care screenings were discussed with patient today  Appropriate education was printed on patient's after visit summary  Counseling:  Alcohol/drug use: discussed moderation in alcohol intake, the recommendations for healthy alcohol use, and avoidance of illicit drug use  Dental Health: discussed importance of regular tooth brushing, flossing, and dental visits  Injury prevention: discussed safety/seat belts, safety helmets, smoke detectors, carbon dioxide detectors, and smoking near bedding or upholstery  Sexual health: discussed sexually transmitted diseases, partner selection, use of condoms, avoidance of unintended pregnancy, and contraceptive alternatives  · Exercise: the importance of regular exercise/physical activity was discussed  Recommend exercise 3-5 times per week for at least 30 minutes  Return in about 4 weeks (around 10/14/2020) for f/u DM  Chief Complaint:     Chief Complaint   Patient presents with    Well Check     positive PHQ-9    New Patient Visit      History of Present Illness:     Adult Annual Physical   Patient here for a comprehensive physical exam  The patient reports problems - as documented below       Notes that she recently got insurance back and has been off of medications for about 2 years  Looking to establish with a new prvoder and restart medications  Notes that she was on insulin, basal bolus  Was taking 35 units of Lantus at night  Was taking Metformin 1000 mg BID  Notes that she has a glucometer  Ran out of strips (she buys them at [employer])  States that she has made a lot of dietary changes and has lost some weight  States that her A1c was previously around 11  Hgb A1c of 9 6 in office today  Used to take Cymbalta 60 mg for the fibromyalgia  States that this was not helpful for her  She is currently taking nothing  Not there her fibromyalgia is acting up  No other medications have been tried  Asthma has been well controlled until the weather change  Using albuterol once daily for the last 2-3 days  Helps with chest tightness  Denies cough or SOB  Notes that she always has flares in the fall, winter  States that she      Diet and Physical Activity  · Diet/Nutrition: working on portion control, eating no carb and high protein for the last 3 weeks  · Exercise: no formal exercise  Walking about 1 hours per day  Depression Screening  PHQ-9 Depression Screening    PHQ-9:    Frequency of the following problems over the past two weeks:       Little interest or pleasure in doing things:  1 - several days  Feeling down, depressed, or hopeless:  2 - more than half the days  Trouble falling or staying asleep, or sleeping too much:  3 - nearly every day  Feeling tired or having little energy:  3 - nearly every day  Poor appetite or overeatin - not at all  Feeling bad about yourself - or that you are a failure or have let yourself or your family down:  0 - not at all  Trouble concentrating on things, such as reading the newspaper or watching television:  0 - not at all  Moving or speaking so slowly that other people could have noticed  Or the opposite - being so fidgety or restless that you have been moving around a lot more than usual:  0 - not at all  Thoughts that you would be better off dead, or of hurting yourself in some way:  0 - not at all  PHQ-2 Score:  3  PHQ-9 Score:  9       General Health  · Sleep: sleeps well  · Hearing: normal - bilateral   · Vision: most recent eye exam <1 year ago and wears glasses and contacts  Due for a diabetic eye exam   · Dental: no dental visits for >1 year, brushes teeth twice daily and does not floss         /GYN Health  · Patient is: premenopausal  · Last menstrual period: 21  · Contraceptive method: tubal     Notes that she has abnormal menses for years  Would like to see OB/GYN and having IUD placed  Review of Systems:     Review of Systems   Constitutional: Positive for fatigue (at baseline)  Negative for activity change, appetite change and fever  HENT: Negative for congestion, ear pain, rhinorrhea and sore throat  Eyes: Negative for pain  Respiratory: Positive for chest tightness  Negative for cough and shortness of breath  Cardiovascular: Negative for chest pain and leg swelling  Gastrointestinal: Positive for abdominal pain (intermittent, GERD)  Negative for abdominal distention, constipation, diarrhea, nausea and vomiting  Endocrine: Negative for polydipsia, polyphagia and polyuria  Genitourinary: Negative for dysuria, frequency and urgency  Musculoskeletal: Positive for myalgias  Negative for gait problem  Skin: Negative for rash  Neurological: Negative for dizziness, light-headedness and headaches  Past Medical History:     Past Medical History:   Diagnosis Date    Asthma     Diabetes mellitus ([facility] [address] )     Fibromyalgia     Hyperlipidemia       Past Surgical History:     Past Surgical History:   Procedure Laterality Date     SECTION      CHOLECYSTECTOMY      COLONOSCOPY N/A 10/24/2018    Procedure: COLONOSCOPY;  Surgeon: Yesi Preston MD;  Location: [facility]; Service: Gastroenterology    ESOPHAGOGASTRODUODENOSCOPY N/A 10/26/2018    Procedure: ESOPHAGOGASTRODUODENOSCOPY (EGD); Surgeon: Yesi Preston MD;  Location: [facility];   Service: Gastroenterology    TUBAL LIGATION        Social History:        Social History     Socioeconomic History    Marital status: /Civil Union     Spouse name: None    Number of children: None    Years of education: None    Highest education level: None   Occupational History    None   Social Needs    Financial resource strain: None    Food insecurity     Worry: None     Inability: None    Transportation needs Medical: None     Non-medical: None   Tobacco Use    Smoking status: Never Smoker    Smokeless tobacco: Never Used   Substance and Sexual Activity    Alcohol use: No    Drug use: No    Sexual activity: None   Lifestyle    Physical activity     Days per week: None     Minutes per session: None    Stress: None   Relationships    Social connections     Talks on phone: None     Gets together: None     Attends Sabianist service: None     Active member of club or organization: None     Attends meetings of clubs or organizations: None     Relationship status: None    Intimate partner violence     Fear of current or ex partner: None     Emotionally abused: None     Physically abused: None     Forced sexual activity: None   Other Topics Concern    None   Social History Narrative    None      Family History:     Family History   Problem Relation Age of Onset    Diabetes Mother       Current Medications:     Current Outpatient Medications   Medication Sig Dispense Refill    albuterol (PROVENTIL HFA,VENTOLIN HFA) 90 mcg/act inhaler Inhale 1 puff every 4 (four) hours as needed for wheezing 1 Inhaler 2    amitriptyline (ELAVIL) 50 mg tablet Take 1 tablet (50 mg total) by mouth daily at bedtime 30 tablet 2    aspirin 81 MG tablet Take by mouth      atorvastatin (LIPITOR) 40 mg tablet Take 1 tablet (40 mg total) by mouth daily at bedtime 30 tablet 2    Blood Glucose Monitoring Suppl (ONE TOUCH ULTRA 2) w/Device KIT as directed      famotidine (PEPCID) 20 mg tablet Take 1 tablet (20 mg total) by mouth 2 (two) times a day 60 tablet 2    fluticasone-salmeterol (Advair Diskus) 500-50 mcg/dose inhaler Inhale 1 puff daily 1 each 2    glucose blood test strip TEST BLOOD SUGARS 3 TIMES DAILY      Lancets (ONETOUCH ULTRASOFT) lancets uncontrolled DM, hyperglycemia, hypertension, test 4 times daily, 250 02 150/month      metFORMIN (GLUCOPHAGE) 1000 MG tablet Take 1 tablet (1,000 mg total) by mouth 2 (two) times a day with meals 60 tablet 2    montelukast (SINGULAIR) 10 mg tablet Take 1 tablet (10 mg total) by mouth daily at bedtime 30 tablet 2    oxyCODONE (ROXICODONE) 5 mg immediate release tablet        No current facility-administered medications for this visit  Allergies:     No Known Allergies   Physical Exam:     /70   Pulse 96   Temp (!) 96 °F (35 6 °C)   Ht 5' 4" (1 626 m)   Wt 68 9 kg (152 lb)   LMP 09/14/2020   SpO2 96%   BMI 26 09 kg/m²     Physical Exam  Vitals signs reviewed  Constitutional:       General: She is not in acute distress  Appearance: Normal appearance  HENT:      Head: Normocephalic and atraumatic  Right Ear: External ear normal       Left Ear: External ear normal       Nose: Nose normal       Mouth/Throat:      Mouth: Mucous membranes are moist    Eyes:      Extraocular Movements: Extraocular movements intact  Conjunctiva/sclera: Conjunctivae normal       Pupils: Pupils are equal, round, and reactive to light  Neck:      Musculoskeletal: Neck supple  Cardiovascular:      Rate and Rhythm: Normal rate and regular rhythm  Pulses: no weak pulses          Dorsalis pedis pulses are 2+ on the right side and 2+ on the left side  Posterior tibial pulses are 2+ on the right side and 2+ on the left side  Heart sounds: Normal heart sounds  Pulmonary:      Effort: Pulmonary effort is normal       Breath sounds: Normal breath sounds  Abdominal:      General: Bowel sounds are normal  There is no distension  Palpations: Abdomen is soft  Tenderness: There is no abdominal tenderness  There is no guarding  Musculoskeletal:      Right lower leg: No edema  Left lower leg: No edema  Feet:      Right foot:      Skin integrity: No ulcer, skin breakdown, erythema, warmth, callus or dry skin  Left foot:      Skin integrity: No ulcer, skin breakdown, erythema, warmth, callus or dry skin     Lymphadenopathy:      Cervical: No cervical adenopathy  Skin:     General: Skin is warm  Capillary Refill: Capillary refill takes less than 2 seconds  Findings: No rash  Neurological:      Mental Status: She is alert  Mental status is at baseline  Patient's shoes and socks removed  Right Foot/Ankle   Right Foot Inspection  Skin Exam: skin normal and skin intact no dry skin, no warmth, no callus, no erythema, no maceration, no abnormal color, no pre-ulcer, no ulcer and no callus                          Toe Exam: ROM and strength within normal limits  Sensory       Monofilament testing: intact  Vascular  Capillary refills: < 3 seconds  The right DP pulse is 2+  The right PT pulse is 2+  Left Foot/Ankle  Left Foot Inspection  Skin Exam: skin normal and skin intactno dry skin, no warmth, no erythema, no maceration, normal color, no pre-ulcer, no ulcer and no callus                         Toe Exam: ROM and strength within normal limits                   Sensory       Monofilament: intact  Vascular  Capillary refills: < 3 seconds  The left DP pulse is 2+  The left PT pulse is 2+  Assign Risk Category:  No deformity present; No loss of protective sensation; No weak pulses       Risk: 0    Balbri Porter DO  [facility] [address]     Depression Screening Follow-up Plan: Patient's depression screening was positive with a PHQ-2 score of 3  Their PHQ-9 score was 9  Patient assessed for underlying major depression  They have no active suicidal ideations  Brief counseling provided and recommend additional follow-up/re-evaluation next office visit

## 2020-09-16 NOTE — PATIENT INSTRUCTIONS
Please check you blood sugars and record them and follow up in 2 weeks as we discussed  Please restart your medications as prescribed  Please go to the lab and get your blood work done  Wellness Visit for Adults   AMBULATORY CARE:   A wellness visit  is when you see your healthcare provider to get screened for health problems  You can also get advice on how to stay healthy  Write down your questions so you remember to ask them  Ask your healthcare provider how often you should have a wellness visit  What happens at a wellness visit:  Your healthcare provider will ask about your health, and your family history of health problems  This includes high blood pressure, heart disease, and cancer  He or she will ask if you have symptoms that concern you, if you smoke, and about your mood  You may also be asked about your intake of medicines, supplements, food, and alcohol  Any of the following may be done:  · Your weight  will be checked  Your height may also be checked so your body mass index (BMI) can be calculated  Your BMI shows if you are at a healthy weight  · Your blood pressure  and heart rate will be checked  Your temperature may also be checked  · Blood and urine tests  may be done  Blood tests may be done to check your cholesterol levels  Abnormal cholesterol levels increase your risk for heart disease and stroke  You may also need a blood or urine test to check for diabetes if you are at increased risk  Urine tests may be done to look for signs of an infection or kidney disease  · A physical exam  includes checking your heartbeat and lungs with a stethoscope  Your healthcare provider may also check your skin to look for sun damage  · Screening tests  may be recommended  A screening test is done to check for diseases that may not cause symptoms  The screening tests you may need depend on your age, gender, family history, and lifestyle habits   For example, colorectal screening may be recommended if you are 48years old or older  Screening tests you need if you are a woman:   · A Pap smear  is used to screen for cervical cancer  Pap smears are usually done every 3 to 5 years depending on your age  You may need them more often if you have had abnormal Pap smear test results in the past  Ask your healthcare provider how often you should have a Pap smear  · A mammogram  is an x-ray of your breasts to screen for breast cancer  Experts recommend mammograms every 2 years starting at age 48 years  You may need a mammogram at age 52 years or younger if you have an increased risk for breast cancer  Talk to your healthcare provider about when you should start having mammograms and how often you need them  Vaccines you may need:   · Get an influenza vaccine  every year  The influenza vaccine protects you from the flu  Several types of viruses cause the flu  The viruses change over time, so new vaccines are made each year  · Get a tetanus-diphtheria (Td) booster vaccine  every 10 years  This vaccine protects you against tetanus and diphtheria  Tetanus is a severe infection that may cause painful muscle spasms and lockjaw  Diphtheria is a severe bacterial infection that causes a thick covering in the back of your mouth and throat  · Get a human papillomavirus (HPV) vaccine  if you are female and aged 23 to 32 or male 23 to 24 and never received it  This vaccine protects you from HPV infection  HPV is the most common infection spread by sexual contact  HPV may also cause vaginal, penile, and anal cancers  · Get a pneumococcal vaccine  if you are aged 72 years or older  The pneumococcal vaccine is an injection given to protect you from pneumococcal disease  Pneumococcal disease is an infection caused by pneumococcal bacteria  The infection may cause pneumonia, meningitis, or an ear infection  · Get a shingles vaccine  if you are aged 61 or older, even if you have had shingles before   The shingles vaccine is an injection to protect you from the varicella-zoster virus  This is the same virus that causes chickenpox  Shingles is a painful rash that develops in people who had chickenpox or have been exposed to the virus  How to eat healthy:  My Plate is a model for planning healthy meals  It shows the types and amounts of foods that should go on your plate  Fruits and vegetables make up about half of your plate, and grains and protein make up the other half  A serving of dairy is included on the side of your plate  The amount of calories and serving sizes you need depends on your age, gender, weight, and height  Examples of healthy foods are listed below:  · Eat a variety of vegetables  such as dark green, red, and orange vegetables  You can also include canned vegetables low in sodium (salt) and frozen vegetables without added butter or sauces  · Eat a variety of fresh fruits , canned fruit in 100% juice, frozen fruit, and dried fruit  · Include whole grains  At least half of the grains you eat should be whole grains  Examples include whole-wheat bread, wheat pasta, brown rice, and whole-grain cereals such as oatmeal     · Eat a variety of protein foods such as seafood (fish and shellfish), lean meat, and poultry without skin (turkey and chicken)  Examples of lean meats include pork leg, shoulder, or tenderloin, and beef round, sirloin, tenderloin, and extra lean ground beef  Other protein foods include eggs and egg substitutes, beans, peas, soy products, nuts, and seeds  · Choose low-fat dairy products such as skim or 1% milk or low-fat yogurt, cheese, and cottage cheese  · Limit unhealthy fats  such as butter, hard margarine, and shortening  Exercise:  Exercise at least 30 minutes per day on most days of the week  Some examples of exercise include walking, biking, dancing, and swimming   You can also fit in more physical activity by taking the stairs instead of the elevator or parking farther away from stores  Include muscle strengthening activities 2 days each week  Regular exercise provides many health benefits  It helps you manage your weight, and decreases your risk for type 2 diabetes, heart disease, stroke, and high blood pressure  Exercise can also help improve your mood  Ask your healthcare provider about the best exercise plan for you  General health and safety guidelines:   · Do not smoke  Nicotine and other chemicals in cigarettes and cigars can cause lung damage  Ask your healthcare provider for information if you currently smoke and need help to quit  E-cigarettes or smokeless tobacco still contain nicotine  Talk to your healthcare provider before you use these products  · Limit alcohol  A drink of alcohol is 12 ounces of beer, 5 ounces of wine, or 1½ ounces of liquor  · Lose weight, if needed  Being overweight increases your risk of certain health conditions  These include heart disease, high blood pressure, type 2 diabetes, and certain types of cancer  · Protect your skin  Do not sunbathe or use tanning beds  Use sunscreen with a SPF 15 or higher  Apply sunscreen at least 15 minutes before you go outside  Reapply sunscreen every 2 hours  Wear protective clothing, hats, and sunglasses when you are outside  · Drive safely  Always wear your seatbelt  Make sure everyone in your car wears a seatbelt  A seatbelt can save your life if you are in an accident  Do not use your cell phone when you are driving  This could distract you and cause an accident  Pull over if you need to make a call or send a text message  · Practice safe sex  Use latex condoms if are sexually active and have more than one partner  Your healthcare provider may recommend screening tests for sexually transmitted infections (STIs)  · Wear helmets, lifejackets, and protective gear    Always wear a helmet when you ride a bike or motorcycle, go skiing, or play sports that could cause a head injury  Wear protective equipment when you play sports  Wear a lifejacket when you are on a boat or doing water sports  © 2017 [address]  Information is for End User's use only and may not be sold, redistributed or otherwise used for commercial purposes  All illustrations and images included in CareNotes® are the copyrighted property of for[MD] , [employer]  or [employer]  The above information is an  only  It is not intended as medical advice for individual conditions or treatments  Talk to your doctor, nurse or pharmacist before following any medical regimen to see if it is safe and effective for you

## 2020-09-18 LAB — SL AMB POCT HEMOGLOBIN AIC: 9.6 (ref ?–6.5)

## 2020-09-21 ENCOUNTER — TELEPHONE (OUTPATIENT)
Dept: GASTROENTEROLOGY | Facility: CLINIC | Age: 45
End: 2020-09-21

## 2020-09-21 ENCOUNTER — APPOINTMENT (OUTPATIENT)
Dept: LAB | Facility: HOSPITAL | Age: 45
End: 2020-09-21
Attending: FAMILY MEDICINE
Payer: OTHER GOVERNMENT

## 2020-09-21 DIAGNOSIS — Z79.4 TYPE 2 DIABETES MELLITUS WITH HYPERGLYCEMIA, WITH LONG-TERM CURRENT USE OF INSULIN (HCC): ICD-10-CM

## 2020-09-21 DIAGNOSIS — E11.65 TYPE 2 DIABETES MELLITUS WITH HYPERGLYCEMIA, WITH LONG-TERM CURRENT USE OF INSULIN (HCC): ICD-10-CM

## 2020-09-21 LAB
ALBUMIN SERPL BCP-MCNC: 3.8 G/DL (ref 3.5–5)
ALP SERPL-CCNC: 73 U/L (ref 46–116)
ALT SERPL W P-5'-P-CCNC: 37 U/L (ref 12–78)
ANION GAP SERPL CALCULATED.3IONS-SCNC: 12 MMOL/L (ref 4–13)
AST SERPL W P-5'-P-CCNC: 23 U/L (ref 5–45)
BILIRUB SERPL-MCNC: 2.4 MG/DL (ref 0.2–1)
BUN SERPL-MCNC: 10 MG/DL (ref 5–25)
CALCIUM SERPL-MCNC: 9.4 MG/DL (ref 8.3–10.1)
CHLORIDE SERPL-SCNC: 103 MMOL/L (ref 100–108)
CHOLEST SERPL-MCNC: 195 MG/DL (ref 50–200)
CO2 SERPL-SCNC: 24 MMOL/L (ref 21–32)
CREAT SERPL-MCNC: 0.66 MG/DL (ref 0.6–1.3)
CREAT UR-MCNC: 613 MG/DL
GFR SERPL CREATININE-BSD FRML MDRD: 108 ML/MIN/1.73SQ M
GLUCOSE P FAST SERPL-MCNC: 247 MG/DL (ref 65–99)
HDLC SERPL-MCNC: 39 MG/DL
LDLC SERPL CALC-MCNC: 94 MG/DL (ref 0–100)
MICROALBUMIN UR-MCNC: 147 MG/L (ref 0–20)
MICROALBUMIN/CREAT 24H UR: 24 MG/G CREATININE (ref 0–30)
NONHDLC SERPL-MCNC: 156 MG/DL
POTASSIUM SERPL-SCNC: 3.9 MMOL/L (ref 3.5–5.3)
PROT SERPL-MCNC: 8.4 G/DL (ref 6.4–8.2)
SODIUM SERPL-SCNC: 139 MMOL/L (ref 136–145)
TRIGL SERPL-MCNC: 308 MG/DL

## 2020-09-21 PROCEDURE — 80053 COMPREHEN METABOLIC PANEL: CPT

## 2020-09-21 PROCEDURE — 36415 COLL VENOUS BLD VENIPUNCTURE: CPT

## 2020-09-21 PROCEDURE — 82043 UR ALBUMIN QUANTITATIVE: CPT | Performed by: FAMILY MEDICINE

## 2020-09-21 PROCEDURE — 82570 ASSAY OF URINE CREATININE: CPT | Performed by: FAMILY MEDICINE

## 2020-09-21 PROCEDURE — 80061 LIPID PANEL: CPT

## 2020-09-22 ENCOUNTER — TRANSCRIBE ORDERS (OUTPATIENT)
Dept: LAB | Facility: HOSPITAL | Age: 45
End: 2020-09-22

## 2020-09-22 DIAGNOSIS — E11.9 TYPE 2 DIABETES MELLITUS WITHOUT COMPLICATION, UNSPECIFIED WHETHER LONG TERM INSULIN USE (HCC): Primary | ICD-10-CM

## 2020-09-23 ENCOUNTER — APPOINTMENT (OUTPATIENT)
Dept: LAB | Facility: HOSPITAL | Age: 45
End: 2020-09-23
Attending: FAMILY MEDICINE
Payer: OTHER GOVERNMENT

## 2020-09-23 DIAGNOSIS — E11.9 TYPE 2 DIABETES MELLITUS WITHOUT COMPLICATION, UNSPECIFIED WHETHER LONG TERM INSULIN USE (HCC): ICD-10-CM

## 2020-09-23 LAB
BASOPHILS # BLD AUTO: 0.05 THOUSANDS/ΜL (ref 0–0.1)
BASOPHILS NFR BLD AUTO: 1 % (ref 0–1)
EOSINOPHIL # BLD AUTO: 0.19 THOUSAND/ΜL (ref 0–0.61)
EOSINOPHIL NFR BLD AUTO: 3 % (ref 0–6)
ERYTHROCYTE [DISTWIDTH] IN BLOOD BY AUTOMATED COUNT: 11.5 % (ref 11.6–15.1)
HCT VFR BLD AUTO: 44.9 % (ref 34.8–46.1)
HGB BLD-MCNC: 16.1 G/DL (ref 11.5–15.4)
IMM GRANULOCYTES # BLD AUTO: 0.02 THOUSAND/UL (ref 0–0.2)
IMM GRANULOCYTES NFR BLD AUTO: 0 % (ref 0–2)
LYMPHOCYTES # BLD AUTO: 2.58 THOUSANDS/ΜL (ref 0.6–4.47)
LYMPHOCYTES NFR BLD AUTO: 34 % (ref 14–44)
MCH RBC QN AUTO: 32.9 PG (ref 26.8–34.3)
MCHC RBC AUTO-ENTMCNC: 35.9 G/DL (ref 31.4–37.4)
MCV RBC AUTO: 92 FL (ref 82–98)
MONOCYTES # BLD AUTO: 0.57 THOUSAND/ΜL (ref 0.17–1.22)
MONOCYTES NFR BLD AUTO: 8 % (ref 4–12)
NEUTROPHILS # BLD AUTO: 4.1 THOUSANDS/ΜL (ref 1.85–7.62)
NEUTS SEG NFR BLD AUTO: 54 % (ref 43–75)
NRBC BLD AUTO-RTO: 0 /100 WBCS
PLATELET # BLD AUTO: 248 THOUSANDS/UL (ref 149–390)
PMV BLD AUTO: 10.2 FL (ref 8.9–12.7)
RBC # BLD AUTO: 4.89 MILLION/UL (ref 3.81–5.12)
WBC # BLD AUTO: 7.51 THOUSAND/UL (ref 4.31–10.16)

## 2020-09-23 PROCEDURE — 85025 COMPLETE CBC W/AUTO DIFF WBC: CPT

## 2020-09-23 PROCEDURE — 36415 COLL VENOUS BLD VENIPUNCTURE: CPT

## 2020-10-07 ENCOUNTER — OFFICE VISIT (OUTPATIENT)
Dept: FAMILY MEDICINE CLINIC | Facility: CLINIC | Age: 45
End: 2020-10-07
Payer: OTHER GOVERNMENT

## 2020-10-07 VITALS
HEART RATE: 100 BPM | DIASTOLIC BLOOD PRESSURE: 82 MMHG | SYSTOLIC BLOOD PRESSURE: 122 MMHG | WEIGHT: 155 LBS | TEMPERATURE: 96.5 F | HEIGHT: 64 IN | BODY MASS INDEX: 26.46 KG/M2 | OXYGEN SATURATION: 96 %

## 2020-10-07 DIAGNOSIS — R19.7 DIARRHEA, UNSPECIFIED TYPE: ICD-10-CM

## 2020-10-07 DIAGNOSIS — R10.84 ABDOMINAL CRAMPING, GENERALIZED: ICD-10-CM

## 2020-10-07 DIAGNOSIS — Z86.010 HISTORY OF COLON POLYPS: ICD-10-CM

## 2020-10-07 DIAGNOSIS — R10.84 GENERALIZED ABDOMINAL PAIN: Primary | ICD-10-CM

## 2020-10-07 DIAGNOSIS — G89.4 CHRONIC PAIN DISORDER: ICD-10-CM

## 2020-10-07 DIAGNOSIS — M79.7 FIBROMYALGIA: ICD-10-CM

## 2020-10-07 PROCEDURE — 99214 OFFICE O/P EST MOD 30 MIN: CPT | Performed by: FAMILY MEDICINE

## 2020-10-07 RX ORDER — DICYCLOMINE HYDROCHLORIDE 10 MG/1
10 CAPSULE ORAL
Qty: 60 CAPSULE | Refills: 1 | Status: SHIPPED | OUTPATIENT
Start: 2020-10-07 | End: 2021-02-25

## 2020-10-08 ENCOUNTER — APPOINTMENT (OUTPATIENT)
Dept: LAB | Facility: HOSPITAL | Age: 45
End: 2020-10-08
Attending: FAMILY MEDICINE
Payer: OTHER GOVERNMENT

## 2020-10-08 DIAGNOSIS — R19.7 DIARRHEA, UNSPECIFIED TYPE: ICD-10-CM

## 2020-10-08 DIAGNOSIS — R10.84 GENERALIZED ABDOMINAL PAIN: ICD-10-CM

## 2020-10-08 LAB
ANION GAP SERPL CALCULATED.3IONS-SCNC: 14 MMOL/L (ref 4–13)
BASOPHILS # BLD AUTO: 0.05 THOUSANDS/ΜL (ref 0–0.1)
BASOPHILS NFR BLD AUTO: 1 % (ref 0–1)
BUN SERPL-MCNC: 10 MG/DL (ref 5–25)
CALCIUM SERPL-MCNC: 8.9 MG/DL (ref 8.3–10.1)
CHLORIDE SERPL-SCNC: 100 MMOL/L (ref 100–108)
CO2 SERPL-SCNC: 24 MMOL/L (ref 21–32)
CREAT SERPL-MCNC: 0.77 MG/DL (ref 0.6–1.3)
EOSINOPHIL # BLD AUTO: 0.2 THOUSAND/ΜL (ref 0–0.61)
EOSINOPHIL NFR BLD AUTO: 3 % (ref 0–6)
ERYTHROCYTE [DISTWIDTH] IN BLOOD BY AUTOMATED COUNT: 11.7 % (ref 11.6–15.1)
GFR SERPL CREATININE-BSD FRML MDRD: 94 ML/MIN/1.73SQ M
GLUCOSE P FAST SERPL-MCNC: 304 MG/DL (ref 65–99)
HCT VFR BLD AUTO: 46.9 % (ref 34.8–46.1)
HGB BLD-MCNC: 16.7 G/DL (ref 11.5–15.4)
IMM GRANULOCYTES # BLD AUTO: 0.03 THOUSAND/UL (ref 0–0.2)
IMM GRANULOCYTES NFR BLD AUTO: 1 % (ref 0–2)
LYMPHOCYTES # BLD AUTO: 1.85 THOUSANDS/ΜL (ref 0.6–4.47)
LYMPHOCYTES NFR BLD AUTO: 30 % (ref 14–44)
MCH RBC QN AUTO: 32.8 PG (ref 26.8–34.3)
MCHC RBC AUTO-ENTMCNC: 35.6 G/DL (ref 31.4–37.4)
MCV RBC AUTO: 92 FL (ref 82–98)
MONOCYTES # BLD AUTO: 0.35 THOUSAND/ΜL (ref 0.17–1.22)
MONOCYTES NFR BLD AUTO: 6 % (ref 4–12)
NEUTROPHILS # BLD AUTO: 3.6 THOUSANDS/ΜL (ref 1.85–7.62)
NEUTS SEG NFR BLD AUTO: 59 % (ref 43–75)
NRBC BLD AUTO-RTO: 0 /100 WBCS
PLATELET # BLD AUTO: 238 THOUSANDS/UL (ref 149–390)
PMV BLD AUTO: 11 FL (ref 8.9–12.7)
POTASSIUM SERPL-SCNC: 3.8 MMOL/L (ref 3.5–5.3)
RBC # BLD AUTO: 5.09 MILLION/UL (ref 3.81–5.12)
SODIUM SERPL-SCNC: 138 MMOL/L (ref 136–145)
WBC # BLD AUTO: 6.08 THOUSAND/UL (ref 4.31–10.16)

## 2020-10-08 PROCEDURE — 36415 COLL VENOUS BLD VENIPUNCTURE: CPT

## 2020-10-08 PROCEDURE — 80048 BASIC METABOLIC PNL TOTAL CA: CPT

## 2020-10-08 PROCEDURE — 85025 COMPLETE CBC W/AUTO DIFF WBC: CPT

## 2020-10-26 ENCOUNTER — TELEPHONE (OUTPATIENT)
Dept: FAMILY MEDICINE CLINIC | Facility: CLINIC | Age: 45
End: 2020-10-26

## 2020-10-26 DIAGNOSIS — R89.9 ABNORMAL LABORATORY TEST: Primary | ICD-10-CM

## 2020-11-06 ENCOUNTER — LAB (OUTPATIENT)
Dept: LAB | Facility: HOSPITAL | Age: 45
End: 2020-11-06
Attending: FAMILY MEDICINE
Payer: OTHER GOVERNMENT

## 2020-11-06 DIAGNOSIS — R89.9 ABNORMAL LABORATORY TEST: ICD-10-CM

## 2020-11-06 LAB
ANION GAP SERPL CALCULATED.3IONS-SCNC: 10 MMOL/L (ref 4–13)
BACTERIA UR QL AUTO: NORMAL /HPF
BILIRUB UR QL STRIP: NEGATIVE
BUN SERPL-MCNC: 15 MG/DL (ref 5–25)
CALCIUM SERPL-MCNC: 8.8 MG/DL (ref 8.3–10.1)
CHLORIDE SERPL-SCNC: 103 MMOL/L (ref 100–108)
CLARITY UR: CLEAR
CO2 SERPL-SCNC: 22 MMOL/L (ref 21–32)
COLOR UR: YELLOW
CREAT SERPL-MCNC: 0.82 MG/DL (ref 0.6–1.3)
GFR SERPL CREATININE-BSD FRML MDRD: 87 ML/MIN/1.73SQ M
GLUCOSE P FAST SERPL-MCNC: 331 MG/DL (ref 65–99)
GLUCOSE UR STRIP-MCNC: ABNORMAL MG/DL
HGB UR QL STRIP.AUTO: NEGATIVE
KETONES UR STRIP-MCNC: NEGATIVE MG/DL
LEUKOCYTE ESTERASE UR QL STRIP: ABNORMAL
NITRITE UR QL STRIP: NEGATIVE
NON-SQ EPI CELLS URNS QL MICRO: NORMAL /HPF
PH UR STRIP.AUTO: 5.5 [PH]
POTASSIUM SERPL-SCNC: 4 MMOL/L (ref 3.5–5.3)
PROT UR STRIP-MCNC: NEGATIVE MG/DL
RBC #/AREA URNS AUTO: NORMAL /HPF
SODIUM SERPL-SCNC: 135 MMOL/L (ref 136–145)
SP GR UR STRIP.AUTO: 1.02 (ref 1–1.03)
UROBILINOGEN UR QL STRIP.AUTO: 0.2 E.U./DL
WBC #/AREA URNS AUTO: NORMAL /HPF

## 2020-11-06 PROCEDURE — 80048 BASIC METABOLIC PNL TOTAL CA: CPT

## 2020-11-06 PROCEDURE — 81001 URINALYSIS AUTO W/SCOPE: CPT | Performed by: FAMILY MEDICINE

## 2020-11-06 PROCEDURE — 36415 COLL VENOUS BLD VENIPUNCTURE: CPT

## 2020-11-23 ENCOUNTER — HOSPITAL ENCOUNTER (EMERGENCY)
Facility: HOSPITAL | Age: 45
Discharge: HOME | End: 2020-11-23
Attending: EMERGENCY MEDICINE
Payer: OTHER GOVERNMENT

## 2020-11-23 ENCOUNTER — APPOINTMENT (EMERGENCY)
Dept: CT IMAGING | Facility: HOSPITAL | Age: 45
End: 2020-11-23
Payer: OTHER GOVERNMENT

## 2020-11-23 VITALS
TEMPERATURE: 97.9 F | HEART RATE: 81 BPM | BODY MASS INDEX: 26.63 KG/M2 | OXYGEN SATURATION: 94 % | DIASTOLIC BLOOD PRESSURE: 79 MMHG | SYSTOLIC BLOOD PRESSURE: 115 MMHG | HEIGHT: 64 IN | RESPIRATION RATE: 20 BRPM | WEIGHT: 156 LBS

## 2020-11-23 DIAGNOSIS — N39.0 URINARY TRACT INFECTION: ICD-10-CM

## 2020-11-23 DIAGNOSIS — G89.29 CHRONIC ABDOMINAL PAIN: Primary | ICD-10-CM

## 2020-11-23 DIAGNOSIS — R10.9 CHRONIC ABDOMINAL PAIN: Primary | ICD-10-CM

## 2020-11-23 LAB
ALBUMIN SERPL BCP-MCNC: 3.4 G/DL (ref 3.5–5)
ALP SERPL-CCNC: 67 U/L (ref 46–116)
ALT SERPL W P-5'-P-CCNC: 27 U/L (ref 12–78)
ANION GAP SERPL CALCULATED.3IONS-SCNC: 13 MMOL/L (ref 4–13)
AST SERPL W P-5'-P-CCNC: <5 U/L (ref 5–45)
BACTERIA UR QL AUTO: ABNORMAL /HPF
BASOPHILS # BLD AUTO: 0.04 THOUSANDS/ΜL (ref 0–0.1)
BASOPHILS NFR BLD AUTO: 1 % (ref 0–1)
BILIRUB SERPL-MCNC: 0.9 MG/DL (ref 0.2–1)
BILIRUB UR QL STRIP: NEGATIVE
BUN SERPL-MCNC: 7 MG/DL (ref 5–25)
CALCIUM ALBUM COR SERPL-MCNC: 9 MG/DL (ref 8.3–10.1)
CALCIUM SERPL-MCNC: 8.5 MG/DL (ref 8.3–10.1)
CHLORIDE SERPL-SCNC: 101 MMOL/L (ref 100–108)
CLARITY UR: CLEAR
CO2 SERPL-SCNC: 23 MMOL/L (ref 21–32)
COLOR UR: YELLOW
CREAT SERPL-MCNC: 0.66 MG/DL (ref 0.6–1.3)
EOSINOPHIL # BLD AUTO: 0.11 THOUSAND/ΜL (ref 0–0.61)
EOSINOPHIL NFR BLD AUTO: 2 % (ref 0–6)
ERYTHROCYTE [DISTWIDTH] IN BLOOD BY AUTOMATED COUNT: 11.3 % (ref 11.6–15.1)
EXT PREG TEST URINE: NEGATIVE
EXT. CONTROL ED NAV: NORMAL
GFR SERPL CREATININE-BSD FRML MDRD: 108 ML/MIN/1.73SQ M
GLUCOSE SERPL-MCNC: 320 MG/DL (ref 65–140)
GLUCOSE UR STRIP-MCNC: ABNORMAL MG/DL
HCT VFR BLD AUTO: 45.5 % (ref 34.8–46.1)
HGB BLD-MCNC: 16.5 G/DL (ref 11.5–15.4)
HGB UR QL STRIP.AUTO: ABNORMAL
IMM GRANULOCYTES # BLD AUTO: 0.03 THOUSAND/UL (ref 0–0.2)
IMM GRANULOCYTES NFR BLD AUTO: 1 % (ref 0–2)
KETONES UR STRIP-MCNC: NEGATIVE MG/DL
LEUKOCYTE ESTERASE UR QL STRIP: ABNORMAL
LIPASE SERPL-CCNC: 125 U/L (ref 73–393)
LYMPHOCYTES # BLD AUTO: 1.85 THOUSANDS/ΜL (ref 0.6–4.47)
LYMPHOCYTES NFR BLD AUTO: 39 % (ref 14–44)
MCH RBC QN AUTO: 33.2 PG (ref 26.8–34.3)
MCHC RBC AUTO-ENTMCNC: 36.3 G/DL (ref 31.4–37.4)
MCV RBC AUTO: 92 FL (ref 82–98)
MONOCYTES # BLD AUTO: 0.33 THOUSAND/ΜL (ref 0.17–1.22)
MONOCYTES NFR BLD AUTO: 7 % (ref 4–12)
NEUTROPHILS # BLD AUTO: 2.35 THOUSANDS/ΜL (ref 1.85–7.62)
NEUTS SEG NFR BLD AUTO: 50 % (ref 43–75)
NITRITE UR QL STRIP: NEGATIVE
NON-SQ EPI CELLS URNS QL MICRO: ABNORMAL /HPF
NRBC BLD AUTO-RTO: 0 /100 WBCS
PH UR STRIP.AUTO: 5.5 [PH]
PLATELET # BLD AUTO: 258 THOUSANDS/UL (ref 149–390)
PMV BLD AUTO: 10.1 FL (ref 8.9–12.7)
POTASSIUM SERPL-SCNC: 3.9 MMOL/L (ref 3.5–5.3)
PROT SERPL-MCNC: 7.5 G/DL (ref 6.4–8.2)
PROT UR STRIP-MCNC: NEGATIVE MG/DL
RBC # BLD AUTO: 4.97 MILLION/UL (ref 3.81–5.12)
RBC #/AREA URNS AUTO: ABNORMAL /HPF
SODIUM SERPL-SCNC: 137 MMOL/L (ref 136–145)
SP GR UR STRIP.AUTO: 1.02 (ref 1–1.03)
UROBILINOGEN UR QL STRIP.AUTO: 0.2 E.U./DL
WBC # BLD AUTO: 4.71 THOUSAND/UL (ref 4.31–10.16)
WBC #/AREA URNS AUTO: ABNORMAL /HPF

## 2020-11-23 PROCEDURE — 81001 URINALYSIS AUTO W/SCOPE: CPT | Performed by: PHYSICIAN ASSISTANT

## 2020-11-23 PROCEDURE — 96360 HYDRATION IV INFUSION INIT: CPT

## 2020-11-23 PROCEDURE — 87147 CULTURE TYPE IMMUNOLOGIC: CPT | Performed by: PHYSICIAN ASSISTANT

## 2020-11-23 PROCEDURE — G1004 CDSM NDSC: HCPCS

## 2020-11-23 PROCEDURE — 99284 EMERGENCY DEPT VISIT MOD MDM: CPT

## 2020-11-23 PROCEDURE — 80053 COMPREHEN METABOLIC PANEL: CPT | Performed by: PHYSICIAN ASSISTANT

## 2020-11-23 PROCEDURE — 83690 ASSAY OF LIPASE: CPT | Performed by: PHYSICIAN ASSISTANT

## 2020-11-23 PROCEDURE — 74177 CT ABD & PELVIS W/CONTRAST: CPT

## 2020-11-23 PROCEDURE — 87086 URINE CULTURE/COLONY COUNT: CPT | Performed by: PHYSICIAN ASSISTANT

## 2020-11-23 PROCEDURE — 85025 COMPLETE CBC W/AUTO DIFF WBC: CPT | Performed by: PHYSICIAN ASSISTANT

## 2020-11-23 PROCEDURE — 99284 EMERGENCY DEPT VISIT MOD MDM: CPT | Performed by: PHYSICIAN ASSISTANT

## 2020-11-23 PROCEDURE — 36415 COLL VENOUS BLD VENIPUNCTURE: CPT | Performed by: PHYSICIAN ASSISTANT

## 2020-11-23 PROCEDURE — 81025 URINE PREGNANCY TEST: CPT | Performed by: PHYSICIAN ASSISTANT

## 2020-11-23 RX ORDER — CEPHALEXIN 500 MG/1
500 CAPSULE ORAL EVERY 8 HOURS SCHEDULED
Qty: 20 CAPSULE | Refills: 0 | Status: SHIPPED | OUTPATIENT
Start: 2020-11-23 | End: 2020-11-30

## 2020-11-23 RX ORDER — CEPHALEXIN 250 MG/1
500 CAPSULE ORAL ONCE
Status: COMPLETED | OUTPATIENT
Start: 2020-11-23 | End: 2020-11-23

## 2020-11-23 RX ADMIN — CEPHALEXIN 500 MG: 250 CAPSULE ORAL at 12:51

## 2020-11-23 RX ADMIN — IOHEXOL 100 ML: 350 INJECTION, SOLUTION INTRAVENOUS at 11:45

## 2020-11-23 RX ADMIN — SODIUM CHLORIDE 1000 ML: 0.9 INJECTION, SOLUTION INTRAVENOUS at 10:05

## 2020-11-24 LAB
BACTERIA UR CULT: ABNORMAL
BACTERIA UR CULT: ABNORMAL

## 2021-01-26 ENCOUNTER — OFFICE VISIT (OUTPATIENT)
Dept: FAMILY MEDICINE CLINIC | Facility: CLINIC | Age: 46
End: 2021-01-26
Payer: OTHER GOVERNMENT

## 2021-01-26 VITALS
DIASTOLIC BLOOD PRESSURE: 82 MMHG | SYSTOLIC BLOOD PRESSURE: 122 MMHG | HEART RATE: 84 BPM | BODY MASS INDEX: 26.12 KG/M2 | OXYGEN SATURATION: 97 % | TEMPERATURE: 96.7 F | WEIGHT: 153 LBS | HEIGHT: 64 IN

## 2021-01-26 DIAGNOSIS — R10.9 ABDOMINAL CRAMPING: ICD-10-CM

## 2021-01-26 DIAGNOSIS — G89.4 CHRONIC PAIN SYNDROME: ICD-10-CM

## 2021-01-26 DIAGNOSIS — E11.65 TYPE 2 DIABETES MELLITUS WITH HYPERGLYCEMIA, WITH LONG-TERM CURRENT USE OF INSULIN (HCC): Primary | ICD-10-CM

## 2021-01-26 DIAGNOSIS — M79.7 FIBROMYALGIA: ICD-10-CM

## 2021-01-26 DIAGNOSIS — Z79.4 TYPE 2 DIABETES MELLITUS WITH HYPERGLYCEMIA, WITH LONG-TERM CURRENT USE OF INSULIN (HCC): Primary | ICD-10-CM

## 2021-01-26 DIAGNOSIS — Z86.010 HISTORY OF COLON POLYPS: ICD-10-CM

## 2021-01-26 LAB — SL AMB POCT HEMOGLOBIN AIC: 9.5 (ref ?–6.5)

## 2021-01-26 PROCEDURE — 83036 HEMOGLOBIN GLYCOSYLATED A1C: CPT | Performed by: FAMILY MEDICINE

## 2021-01-26 PROCEDURE — 99214 OFFICE O/P EST MOD 30 MIN: CPT | Performed by: FAMILY MEDICINE

## 2021-01-28 DIAGNOSIS — G89.4 CHRONIC PAIN SYNDROME: ICD-10-CM

## 2021-01-28 DIAGNOSIS — M79.7 FIBROMYALGIA: Primary | ICD-10-CM

## 2021-01-28 NOTE — PROGRESS NOTES
Referral to Pain management denied as Dr Doreen Mcleod does not treat Fibromyalgia, patient was recommended to be referred to rheumatology  Referral to rheumatology placed       Linda Moreno [facility]  1/28/2021 12:42 PM

## 2021-02-12 ENCOUNTER — OFFICE VISIT (OUTPATIENT)
Dept: FAMILY MEDICINE CLINIC | Facility: CLINIC | Age: 46
End: 2021-02-12
Payer: OTHER GOVERNMENT

## 2021-02-12 VITALS
BODY MASS INDEX: 26.87 KG/M2 | HEART RATE: 93 BPM | DIASTOLIC BLOOD PRESSURE: 70 MMHG | OXYGEN SATURATION: 99 % | SYSTOLIC BLOOD PRESSURE: 100 MMHG | WEIGHT: 157.4 LBS | TEMPERATURE: 97.9 F | HEIGHT: 64 IN

## 2021-02-12 DIAGNOSIS — E78.2 MIXED HYPERLIPIDEMIA: ICD-10-CM

## 2021-02-12 DIAGNOSIS — Z00.00 ANNUAL PHYSICAL EXAM: Primary | ICD-10-CM

## 2021-02-12 DIAGNOSIS — E11.65 TYPE 2 DIABETES MELLITUS WITH HYPERGLYCEMIA, WITH LONG-TERM CURRENT USE OF INSULIN (HCC): ICD-10-CM

## 2021-02-12 DIAGNOSIS — Z79.4 TYPE 2 DIABETES MELLITUS WITH HYPERGLYCEMIA, WITH LONG-TERM CURRENT USE OF INSULIN (HCC): ICD-10-CM

## 2021-02-12 PROCEDURE — 99213 OFFICE O/P EST LOW 20 MIN: CPT | Performed by: FAMILY MEDICINE

## 2021-02-12 RX ORDER — OMEGA-3-ACID ETHYL ESTERS 1 G/1
2 CAPSULE, LIQUID FILLED ORAL 2 TIMES DAILY
Qty: 180 CAPSULE | Refills: 2 | Status: SHIPPED | OUTPATIENT
Start: 2021-02-12 | End: 2022-02-02

## 2021-02-12 NOTE — PROGRESS NOTES
[facility] [address] [facility]    NAME: Micahel Wolf  AGE: 41 y o  SEX: female  : 1975     DATE: 2/15/2021     Assessment and Plan:     Problem List Items Addressed This Visit        Endocrine    Type 2 diabetes mellitus with hyperglycemia, with long-term current use of insulin ([address] )  She needs tighter glucose control, will start GLP-1 if lipids have not worsened significantly  Will switch to a different SGLT-2 inhibitor, like Invokana, when the empagliflozin is due for refill in an attempt to make more affordable  Relevant Medications    Dulaglutide 0 75 MG/0 5ML SOPN      Other Visit Diagnoses     Annual physical exam    -  Primary    BMI 27 0-27 9,adult        Mixed hyperlipidemia        Relevant Medications    omega-3-acid ethyl esters (LOVAZA) 1 g capsule    Other Relevant Orders    Lipid panel        Immunizations and preventive care screenings were discussed with patient today  Appropriate education was printed on patient's after visit summary  Counseling:  Alcohol/drug use: discussed moderation in alcohol intake, the recommendations for healthy alcohol use, and avoidance of illicit drug use  Dental Health: discussed importance of regular tooth brushing, flossing, and dental visits  Injury prevention: discussed safety/seat belts, safety helmets, smoke detectors, carbon dioxide detectors, and smoking near bedding or upholstery  Sexual health: discussed sexually transmitted diseases, partner selection, use of condoms, avoidance of unintended pregnancy, and contraceptive alternatives  · Exercise: the importance of regular exercise/physical activity was discussed  Recommend exercise 3-5 times per week for at least 30 minutes  BMI Counseling: Body mass index is 27 02 kg/m²   The BMI is above normal  Nutrition recommendations include decreasing portion sizes, encouraging healthy choices of fruits and vegetables, consuming healthier snacks, limiting drinks that contain sugar, moderation in carbohydrate intake, increasing intake of lean protein and reducing intake of cholesterol  Exercise recommendations include moderate physical activity 150 minutes/week and exercising 3-5 times per week  No pharmacotherapy was ordered  Return in about 2 weeks (around 2/26/2021) for f/u DM  Chief Complaint:     Chief Complaint   Patient presents with    Annual Exam     2 week f/u diabetes  Referral for Gyn placed 09/2020 - pt states has not gone as of yet      History of Present Illness:     Adult Annual Physical   Patient here for a comprehensive physical exam  The patient reports problems - as documented below  Started the empagliflozin 25 mg  States that it has helped with her blood sugars a lot  Notes that it was very expensive, 175$ for a 1 month supply  She is not going to be able to continue on this medication  Blood glucose log of 1/27/21 to 2/11/21 of AM fasting glucose 168-220, average 170-180  Denies any low blood sugars  Denies any side effects from the medication  Denies any urinary concerns or complaints  Diet and Physical Activity  · Diet/Nutrition: followoing a keto diet   Blood glucose has improved tremendously  · Exercise: no formal exercise  Depression Screening  PHQ-9 Depression Screening    PHQ-9:   Frequency of the following problems over the past two weeks:      Little interest or pleasure in doing things: 0 - not at all  Feeling down, depressed, or hopeless: 0 - not at all  PHQ-2 Score: 0       General Health  · Sleep: sleeps well  Some trouble falling asleep  · Hearing: normal - bilateral   · Vision: goes for regular eye exams, most recent eye exam <1 year ago and wears glasses  Notes that she has an eye doctor appointment coming up soon  · Dental: regular dental visits, brushes teeth twice daily and flosses teeth occasionally         /GYN Health  · Patient is: premenopausal  · Last menstrual period: 21  · Contraceptive method: none  · She is not sexually active  Review of Systems:     Review of Systems   Constitutional: Negative for activity change, appetite change and fever  HENT: Negative for congestion, ear pain, rhinorrhea and sore throat  Eyes: Negative for pain  Respiratory: Negative for cough and shortness of breath  Cardiovascular: Negative for chest pain and leg swelling  Gastrointestinal: Positive for abdominal pain (chronic) and diarrhea (chronic)  Negative for constipation, nausea and vomiting  Genitourinary: Negative for dysuria, frequency and urgency  Musculoskeletal: Negative for gait problem  Skin: Negative for rash  Neurological: Positive for numbness (neuropathy of B/L LE)  Negative for dizziness, light-headedness and headaches  Past Medical History:     Past Medical History:   Diagnosis Date    Asthma     Diabetes mellitus ([address] [facility] )     Fibromyalgia     Hyperlipidemia       Past Surgical History:     Past Surgical History:   Procedure Laterality Date     SECTION      CHOLECYSTECTOMY      COLONOSCOPY N/A 10/24/2018    Procedure: COLONOSCOPY;  Surgeon: Lauro Faria MD;  [facility]; Service: Gastroenterology    ESOPHAGOGASTRODUODENOSCOPY N/A 10/26/2018    Procedure: ESOPHAGOGASTRODUODENOSCOPY (EGD); Surgeon: Lauro Faria MD;  [facility];   Service: Gastroenterology    TUBAL LIGATION        Social History:     E-Cigarette/Vaping    E-Cigarette Use Never User      E-Cigarette/Vaping Substances    Nicotine No     THC No     CBD No     Flavoring No     Other No     Unknown No      Social History     Socioeconomic History    Marital status: /Civil Union     Spouse name: None    Number of children: None    Years of education: None    Highest education level: None   Occupational History    None   Social Needs    Financial resource strain: None    Food insecurity     Worry: None     Inability: None    Transportation needs     Medical: None     Non-medical: None   Tobacco Use    Smoking status: Never Smoker    Smokeless tobacco: Never Used   Substance and Sexual Activity    Alcohol use: No    Drug use: No    Sexual activity: None   Lifestyle    Physical activity     Days per week: None     Minutes per session: None    Stress: None   Relationships    Social connections     Talks on phone: None     Gets together: None     Attends Religion service: None     Active member of club or organization: None     Attends meetings of clubs or organizations: None     Relationship status: None    Intimate partner violence     Fear of current or ex partner: None     Emotionally abused: None     Physically abused: None     Forced sexual activity: None   Other Topics Concern    None   Social History Narrative    None      Family History:     Family History   Problem Relation Age of Onset    Diabetes Mother       Current Medications:     Current Outpatient Medications   Medication Sig Dispense Refill    albuterol (PROVENTIL HFA,VENTOLIN HFA) 90 mcg/act inhaler Inhale 1 puff every 4 (four) hours as needed for wheezing 1 Inhaler 2    amitriptyline (ELAVIL) 50 mg tablet Take 1 tablet (50 mg total) by mouth daily at bedtime 30 tablet 2    aspirin 81 MG tablet Take by mouth daily       atorvastatin (LIPITOR) 40 mg tablet Take 1 tablet (40 mg total) by mouth daily at bedtime 30 tablet 2    Blood Glucose Monitoring Suppl (ONE TOUCH ULTRA 2) w/Device KIT as directed      dicyclomine (BENTYL) 10 mg capsule Take 1 capsule (10 mg total) by mouth 4 (four) times a day (before meals and at bedtime) 60 capsule 1    Empagliflozin 25 MG TABS Take 1 tablet (25 mg total) by mouth every morning 30 tablet 2    famotidine (PEPCID) 20 mg tablet Take 1 tablet (20 mg total) by mouth 2 (two) times a day 60 tablet 2    fluticasone-salmeterol (Advair Diskus) 500-50 mcg/dose inhaler Inhale 1 puff daily 1 each 2  glucose blood test strip TEST BLOOD SUGARS 3 TIMES DAILY      Lancets (ONETOUCH ULTRASOFT) lancets uncontrolled DM, hyperglycemia, hypertension, test 4 times daily, 250 02 150/month      metFORMIN (GLUCOPHAGE) 1000 MG tablet Take 1 tablet (1,000 mg total) by mouth 2 (two) times a day with meals 60 tablet 2    montelukast (SINGULAIR) 10 mg tablet Take 1 tablet (10 mg total) by mouth daily at bedtime 30 tablet 2    Dulaglutide 0 75 MG/0 5ML SOPN Inject 0 5 mL (0 75 mg total) under the skin once a week 4 pen 0    omega-3-acid ethyl esters (LOVAZA) 1 g capsule Take 2 capsules (2 g total) by mouth 2 (two) times a day 180 capsule 2    oxyCODONE (ROXICODONE) 5 mg immediate release tablet        No current facility-administered medications for this visit  Allergies:     No Known Allergies   Physical Exam:     /70 (BP Location: Left arm, Patient Position: Sitting, Cuff Size: Adult)   Pulse 93   Temp 97 9 °F (36 6 °C) (Tympanic)   Ht 5' 4" (1 626 m)   Wt 71 4 kg (157 lb 6 4 oz)   SpO2 99%   BMI 27 02 kg/m²     Physical Exam  Vitals signs reviewed  Constitutional:       General: She is not in acute distress  Appearance: Normal appearance  HENT:      Head: Normocephalic and atraumatic  Right Ear: External ear normal       Left Ear: External ear normal       Nose: Nose normal       Mouth/Throat:      Mouth: Mucous membranes are moist    Eyes:      Extraocular Movements: Extraocular movements intact  Conjunctiva/sclera: Conjunctivae normal       Pupils: Pupils are equal, round, and reactive to light  Neck:      Musculoskeletal: Neck supple  Cardiovascular:      Rate and Rhythm: Normal rate and regular rhythm  Heart sounds: Normal heart sounds  Pulmonary:      Effort: Pulmonary effort is normal       Breath sounds: Normal breath sounds  Abdominal:      General: Bowel sounds are normal  There is no distension  Palpations: Abdomen is soft  Tenderness:  There is abdominal tenderness (mild, generalized)  There is no guarding  Musculoskeletal:      Right lower leg: No edema  Left lower leg: No edema  Lymphadenopathy:      Cervical: No cervical adenopathy  Skin:     General: Skin is warm  Capillary Refill: Capillary refill takes less than 2 seconds  Findings: No rash  Neurological:      Mental Status: She is alert  Mental status is at baseline          Billie Benavides [address]

## 2021-02-12 NOTE — PATIENT INSTRUCTIONS

## 2021-02-17 ENCOUNTER — TELEPHONE (OUTPATIENT)
Dept: FAMILY MEDICINE CLINIC | Facility: CLINIC | Age: 46
End: 2021-02-17

## 2021-02-17 NOTE — TELEPHONE ENCOUNTER
This is Dr Vianney Salcedo pt  Pt  came into the office and for medication empaglifozin it is too expensive and a ORsk if you can switch to something else    [phone]

## 2021-02-17 NOTE — TELEPHONE ENCOUNTER
Per her most recent note, she was to stop taking empagliflozin and start dulaglutide? Did she  this new medication?

## 2021-02-23 ENCOUNTER — LAB (OUTPATIENT)
Dept: LAB | Facility: HOSPITAL | Age: 46
End: 2021-02-23
Attending: FAMILY MEDICINE
Payer: OTHER GOVERNMENT

## 2021-02-23 DIAGNOSIS — E78.2 MIXED HYPERLIPIDEMIA: ICD-10-CM

## 2021-02-23 LAB
CHOLEST SERPL-MCNC: 203 MG/DL (ref 50–200)
HDLC SERPL-MCNC: 37 MG/DL
NONHDLC SERPL-MCNC: 166 MG/DL
TRIGL SERPL-MCNC: 540 MG/DL

## 2021-02-23 PROCEDURE — 36415 COLL VENOUS BLD VENIPUNCTURE: CPT

## 2021-02-23 PROCEDURE — 80061 LIPID PANEL: CPT

## 2021-02-25 ENCOUNTER — TELEPHONE (OUTPATIENT)
Dept: FAMILY MEDICINE CLINIC | Facility: CLINIC | Age: 46
End: 2021-02-25

## 2021-02-25 ENCOUNTER — OFFICE VISIT (OUTPATIENT)
Dept: GASTROENTEROLOGY | Facility: CLINIC | Age: 46
End: 2021-02-25
Payer: OTHER GOVERNMENT

## 2021-02-25 VITALS
WEIGHT: 154 LBS | HEART RATE: 81 BPM | BODY MASS INDEX: 26.29 KG/M2 | HEIGHT: 64 IN | SYSTOLIC BLOOD PRESSURE: 112 MMHG | DIASTOLIC BLOOD PRESSURE: 64 MMHG

## 2021-02-25 DIAGNOSIS — Z86.010 HISTORY OF COLON POLYPS: ICD-10-CM

## 2021-02-25 DIAGNOSIS — R19.7 DIARRHEA, UNSPECIFIED TYPE: ICD-10-CM

## 2021-02-25 DIAGNOSIS — R10.9 ABDOMINAL CRAMPING: ICD-10-CM

## 2021-02-25 DIAGNOSIS — R11.0 NAUSEA: ICD-10-CM

## 2021-02-25 DIAGNOSIS — R63.4 WEIGHT LOSS: ICD-10-CM

## 2021-02-25 DIAGNOSIS — K21.9 GASTROESOPHAGEAL REFLUX DISEASE, UNSPECIFIED WHETHER ESOPHAGITIS PRESENT: ICD-10-CM

## 2021-02-25 PROCEDURE — 99203 OFFICE O/P NEW LOW 30 MIN: CPT | Performed by: PHYSICIAN ASSISTANT

## 2021-02-25 RX ORDER — CHOLESTYRAMINE 4 G/9G
1 POWDER, FOR SUSPENSION ORAL
Qty: 90 PACKET | Refills: 0 | Status: SHIPPED | OUTPATIENT
Start: 2021-02-25 | End: 2022-02-02

## 2021-02-25 RX ORDER — OMEPRAZOLE 40 MG/1
40 CAPSULE, DELAYED RELEASE ORAL DAILY
Qty: 30 CAPSULE | Refills: 2 | Status: SHIPPED | OUTPATIENT
Start: 2021-02-25 | End: 2022-02-02

## 2021-02-25 RX ORDER — DICYCLOMINE HCL 20 MG
20 TABLET ORAL EVERY 6 HOURS PRN
Qty: 45 TABLET | Refills: 1 | Status: SHIPPED | OUTPATIENT
Start: 2021-02-25 | End: 2022-02-02

## 2021-02-25 NOTE — H&P (VIEW-ONLY)
[facility] - Outpatient Consultation  Natali Gardiner 41 y o  female MRN: [id number]  Encounter: [id number]          ASSESSMENT AND PLAN:      1  Abdominal cramping  2  Diarrhea, unspecified type  3  Weight loss  4  Nausea  5  Gastroesophageal reflux disease, unspecified whether esophagitis present  She has a very long history of these types of symptoms with a dramatic worsening over the past 2 years  She now reports bloody stools with incontinence, nocturnal symptoms and weight loss  Colonoscopy from 10/2018 showed mild pancolitis with normal biopsies    She needs a complete workup - will plan labs, stool cultures and repeat EGD and colonoscopy  Start Omeprazole daily, Cholestyramine TID and Dicyclomine prn    Consider SBBO and could consider a Xifaxan course if above testing is negative      ______________________________________________________________________    HPI:  60-year-old female presents for evaluation of a multitude of GI symptoms  She reports daily issues with diarrhea, abdominal pain, nausea, vomiting and heartburn  She reports all the symptoms have been problematic over the past 20 years however they have been dramatically worsening over the past 2 years  She reports that now she is having diarrhea to the point of incontinence  She states that she does not even feel as if she is going to have a bowel movement in the stool comes out  She reports the abdominal care cramping can be so severe that she cannot get out of bed  She reports episodes of rectal bleeding  She has lost about 50 lb over the past 2 years  She reports frequent episodes of nausea with vomiting and severe acid reflux  She is taking famotidine for the acid reflux without relief  She is taking dicyclomine 10 mg p r n  abdominal pain with minimal relief  She was admitted to [facility] in October of 2018 with these symptoms  At that time she was evaluated by Dr Jason España    EGD was reported as normal with biopsies of the stomach and duodenum coming back negative  Colonoscopy showed mild pan colitis  Interestingly, biopsies of this inflammatory condition were completely benign  She was diagnosed with an infectious gastroenteritis at that time  CT scans have consistently come back normal       REVIEW OF SYSTEMS:    CONSTITUTIONAL: Denies any fever, chills, rigors, and weight loss  HEENT: No earache or tinnitus  Denies hearing loss or visual disturbances  CARDIOVASCULAR: No chest pain or palpitations  RESPIRATORY: Denies any cough, hemoptysis, shortness of breath or dyspnea on exertion  GASTROINTESTINAL: As noted in the History of Present Illness  GENITOURINARY: No problems with urination  Denies any hematuria or dysuria  NEUROLOGIC: No dizziness or vertigo, denies headaches  MUSCULOSKELETAL: Denies any muscle or joint pain  SKIN: Denies skin rashes or itching  ENDOCRINE: Denies excessive thirst  Denies intolerance to heat or cold  PSYCHOSOCIAL: Denies depression or anxiety  Denies any recent memory loss  Historical Information   Past Medical History:   Diagnosis Date    Asthma     Diabetes mellitus ([facility] [address] )     Fibromyalgia     Hyperlipidemia      Past Surgical History:   Procedure Laterality Date     SECTION      CHOLECYSTECTOMY      COLONOSCOPY N/A 10/24/2018    Procedure: COLONOSCOPY;  Surgeon: Makenzie Olivier MD;  [facility]: [facility]; Service: Gastroenterology    ESOPHAGOGASTRODUODENOSCOPY N/A 10/26/2018    Procedure: ESOPHAGOGASTRODUODENOSCOPY (EGD); Surgeon: Makenzie Olivier MD;  [facility];   Service: Gastroenterology    TUBAL LIGATION       Social History   Social History     Substance and Sexual Activity   Alcohol Use No     Social History     Substance and Sexual Activity   Drug Use No     Social History     Tobacco Use   Smoking Status Never Smoker   Smokeless Tobacco Never Used     Family History   Problem Relation Age of Onset    Diabetes Mother Meds/Allergies       Current Outpatient Medications:     albuterol (PROVENTIL HFA,VENTOLIN HFA) 90 mcg/act inhaler    amitriptyline (ELAVIL) 50 mg tablet    aspirin 81 MG tablet    atorvastatin (LIPITOR) 40 mg tablet    Blood Glucose Monitoring Suppl (ONE TOUCH ULTRA 2) w/Device KIT    Dulaglutide 0 75 MG/0 5ML SOPN    Empagliflozin 25 MG TABS    famotidine (PEPCID) 20 mg tablet    fluticasone-salmeterol (Advair Diskus) 500-50 mcg/dose inhaler    glucose blood test strip    Lancets (ONETOUCH ULTRASOFT) lancets    metFORMIN (GLUCOPHAGE) 1000 MG tablet    montelukast (SINGULAIR) 10 mg tablet    omega-3-acid ethyl esters (LOVAZA) 1 g capsule    oxyCODONE (ROXICODONE) 5 mg immediate release tablet    cholestyramine (QUESTRAN) 4 g packet    dicyclomine (BENTYL) 20 mg tablet    omeprazole (PriLOSEC) 40 MG capsule    No Known Allergies        Objective     Blood pressure 112/64, pulse 81, height 5' 4" (1 626 m), weight 69 9 kg (154 lb)  Body mass index is 26 43 kg/m²  PHYSICAL EXAM:      General Appearance:   Alert, cooperative, no distress   HEENT:   Normocephalic, atraumatic, anicteric      Neck:  Supple, symmetrical, trachea midline   Lungs:   Clear to auscultation bilaterally; no rales, rhonchi or wheezing; respirations unlabored    Heart[de-identified]   Regular rate and rhythm; no murmur, rub, or gallop  Abdomen:   Soft, non-tender, non-distended; normal bowel sounds; no masses, no organomegaly    Genitalia:   Deferred    Rectal:   Deferred    Extremities:  No cyanosis, clubbing or edema    Pulses:  2+ and symmetric    Skin:  No jaundice, rashes, or lesions    Lymph nodes:  No palpable cervical lymphadenopathy        Lab Results:   No visits with results within 1 Day(s) from this visit     Latest known visit with results is:   Lab on 02/23/2021   Component Date Value    Cholesterol 02/23/2021 203*    Triglycerides 02/23/2021 540*    HDL, Direct 02/23/2021 37*    LDL Calculated 02/23/2021      Non-HDL-Chol (CHOL-HDL) 02/23/2021 166          Radiology Results:   No results found

## 2021-02-25 NOTE — TELEPHONE ENCOUNTER
Patient should not take this medication as her triglycerides are too high  We will discuss other option at her follow up       Octavio Cortez DO  [facility]  2/25/2021 3:10 PM

## 2021-02-25 NOTE — PROGRESS NOTES
[facility] - Outpatient Consultation  Caroline Corral 41 y o  female MRN: [id number]  Encounter: [id number]          ASSESSMENT AND PLAN:      1  Abdominal cramping  2  Diarrhea, unspecified type  3  Weight loss  4  Nausea  5  Gastroesophageal reflux disease, unspecified whether esophagitis present  She has a very long history of these types of symptoms with a dramatic worsening over the past 2 years  She now reports bloody stools with incontinence, nocturnal symptoms and weight loss  Colonoscopy from 10/2018 showed mild pancolitis with normal biopsies    She needs a complete workup - will plan labs, stool cultures and repeat EGD and colonoscopy  Start Omeprazole daily, Cholestyramine TID and Dicyclomine prn    Consider SBBO and could consider a Xifaxan course if above testing is negative      ______________________________________________________________________    HPI:  43-year-old female presents for evaluation of a multitude of GI symptoms  She reports daily issues with diarrhea, abdominal pain, nausea, vomiting and heartburn  She reports all the symptoms have been problematic over the past 20 years however they have been dramatically worsening over the past 2 years  She reports that now she is having diarrhea to the point of incontinence  She states that she does not even feel as if she is going to have a bowel movement in the stool comes out  She reports the abdominal care cramping can be so severe that she cannot get out of bed  She reports episodes of rectal bleeding  She has lost about 50 lb over the past 2 years  She reports frequent episodes of nausea with vomiting and severe acid reflux  She is taking famotidine for the acid reflux without relief  She is taking dicyclomine 10 mg p r n  abdominal pain with minimal relief  She was admitted to [facility] in October of 2018 with these symptoms  At that time she was evaluated by Dr Tammi Hernandez    EGD was reported as normal with biopsies of the stomach and duodenum coming back negative  Colonoscopy showed mild pan colitis  Interestingly, biopsies of this inflammatory condition were completely benign  She was diagnosed with an infectious gastroenteritis at that time  CT scans have consistently come back normal       REVIEW OF SYSTEMS:    CONSTITUTIONAL: Denies any fever, chills, rigors, and weight loss  HEENT: No earache or tinnitus  Denies hearing loss or visual disturbances  CARDIOVASCULAR: No chest pain or palpitations  RESPIRATORY: Denies any cough, hemoptysis, shortness of breath or dyspnea on exertion  GASTROINTESTINAL: As noted in the History of Present Illness  GENITOURINARY: No problems with urination  Denies any hematuria or dysuria  NEUROLOGIC: No dizziness or vertigo, denies headaches  MUSCULOSKELETAL: Denies any muscle or joint pain  SKIN: Denies skin rashes or itching  ENDOCRINE: Denies excessive thirst  Denies intolerance to heat or cold  PSYCHOSOCIAL: Denies depression or anxiety  Denies any recent memory loss  Historical Information   Past Medical History:   Diagnosis Date    Asthma     Diabetes mellitus [address] [address] )     Fibromyalgia     Hyperlipidemia      Past Surgical History:   Procedure Laterality Date     SECTION      CHOLECYSTECTOMY      COLONOSCOPY N/A 10/24/2018    Procedure: COLONOSCOPY;  Surgeon: Javy Gillespie MD;  [facility]; Service: Gastroenterology    ESOPHAGOGASTRODUODENOSCOPY N/A 10/26/2018    Procedure: ESOPHAGOGASTRODUODENOSCOPY (EGD); Surgeon: Javy Gillespie MD;  [facility];   Service: Gastroenterology    TUBAL LIGATION       Social History   Social History     Substance and Sexual Activity   Alcohol Use No     Social History     Substance and Sexual Activity   Drug Use No     Social History     Tobacco Use   Smoking Status Never Smoker   Smokeless Tobacco Never Used     Family History   Problem Relation Age of Onset    Diabetes Mother Meds/Allergies       Current Outpatient Medications:     albuterol (PROVENTIL HFA,VENTOLIN HFA) 90 mcg/act inhaler    amitriptyline (ELAVIL) 50 mg tablet    aspirin 81 MG tablet    atorvastatin (LIPITOR) 40 mg tablet    Blood Glucose Monitoring Suppl (ONE TOUCH ULTRA 2) w/Device KIT    Dulaglutide 0 75 MG/0 5ML SOPN    Empagliflozin 25 MG TABS    famotidine (PEPCID) 20 mg tablet    fluticasone-salmeterol (Advair Diskus) 500-50 mcg/dose inhaler    glucose blood test strip    Lancets (ONETOUCH ULTRASOFT) lancets    metFORMIN (GLUCOPHAGE) 1000 MG tablet    montelukast (SINGULAIR) 10 mg tablet    omega-3-acid ethyl esters (LOVAZA) 1 g capsule    oxyCODONE (ROXICODONE) 5 mg immediate release tablet    cholestyramine (QUESTRAN) 4 g packet    dicyclomine (BENTYL) 20 mg tablet    omeprazole (PriLOSEC) 40 MG capsule    No Known Allergies        Objective     Blood pressure 112/64, pulse 81, height 5' 4" (1 626 m), weight 69 9 kg (154 lb)  Body mass index is 26 43 kg/m²  PHYSICAL EXAM:      General Appearance:   Alert, cooperative, no distress   HEENT:   Normocephalic, atraumatic, anicteric      Neck:  Supple, symmetrical, trachea midline   Lungs:   Clear to auscultation bilaterally; no rales, rhonchi or wheezing; respirations unlabored    Heart[de-identified]   Regular rate and rhythm; no murmur, rub, or gallop  Abdomen:   Soft, non-tender, non-distended; normal bowel sounds; no masses, no organomegaly    Genitalia:   Deferred    Rectal:   Deferred    Extremities:  No cyanosis, clubbing or edema    Pulses:  2+ and symmetric    Skin:  No jaundice, rashes, or lesions    Lymph nodes:  No palpable cervical lymphadenopathy        Lab Results:   No visits with results within 1 Day(s) from this visit     Latest known visit with results is:   Lab on 02/23/2021   Component Date Value    Cholesterol 02/23/2021 203*    Triglycerides 02/23/2021 540*    HDL, Direct 02/23/2021 37*    LDL Calculated 02/23/2021      Non-HDL-Chol (CHOL-HDL) 02/23/2021 166          Radiology Results:   No results found

## 2021-02-25 NOTE — TELEPHONE ENCOUNTER
Received a fax from pharmacy stating the co - pay for Dulaglutide is $213  Patient is wondering if there is an alternative  Please advise

## 2021-02-26 NOTE — PROGRESS NOTES
Assessment/Plan:    No problem-specific Assessment & Plan notes found for this encounter  Diagnoses and all orders for this visit:    Type 2 diabetes mellitus with hyperglycemia, with long-term current use of insulin ([address] )  Due to continued elevation of Hgb A1c, will do 25 mg Empagliflozin  Discussed need for increased compliance with her medications  -     POCT hemoglobin A1c  -     Empagliflozin 25 MG TABS; Take 1 tablet (25 mg total) by mouth every morning    Abdominal cramping  History of colon polyps  -     Ambulatory referral to Gastroenterology; Future    Chronic pain syndrome  Fibromyalgia  -     Ambulatory referral to Pain Management; Future    Return in about 2 weeks (around 2/9/2021) for f/u on DM  Subjective:      Patient ID: Michael Wolf is a 41 y o  female  HPI  Patient presents to the office to follow up on test results  Hgb A1c of 9 5 in office today  Patient reports poor glucose control at home  States that sometimes she doesn't take her medication/insulin  She is understanding that she needs better glucose control  Notes that she continues to have her abdominal cramping that, at times is debilitating    + nausea, no vomiting  Sometimes with constipation, sometimes with diarrhea  Has taken PPI without improvement  The following portions of the patient's history were reviewed and updated as appropriate: allergies, current medications, past family history, past medical history, past social history, past surgical history and problem list     Review of Systems      Objective:    /82   Pulse 84   Temp (!) 96 7 °F (35 9 °C)   Ht 5' 4" (1 626 m)   Wt 69 4 kg (153 lb)   SpO2 97%   BMI 26 26 kg/m²      Physical Exam  Vitals signs reviewed  Constitutional:       General: She is not in acute distress  Appearance: Normal appearance  HENT:      Head: Normocephalic and atraumatic        Right Ear: External ear normal       Left Ear: External ear normal       Nose: Nose normal       Mouth/Throat:      Mouth: Mucous membranes are moist    Eyes:      Extraocular Movements: Extraocular movements intact  Conjunctiva/sclera: Conjunctivae normal       Pupils: Pupils are equal, round, and reactive to light  Neck:      Musculoskeletal: Neck supple  Cardiovascular:      Rate and Rhythm: Normal rate and regular rhythm  Heart sounds: Normal heart sounds  Pulmonary:      Effort: Pulmonary effort is normal       Breath sounds: Normal breath sounds  No wheezing, rhonchi or rales  Abdominal:      General: Abdomen is flat  Bowel sounds are normal  There is no distension  Palpations: Abdomen is soft  Tenderness: There is abdominal tenderness (generalized)  There is guarding (voluntary)  There is no rebound  Hernia: No hernia is present  Musculoskeletal:         General: No deformity  Right lower leg: No edema  Left lower leg: No edema  Lymphadenopathy:      Cervical: No cervical adenopathy  Skin:     General: Skin is warm  Capillary Refill: Capillary refill takes less than 2 seconds  Findings: No rash  Neurological:      Mental Status: She is alert  Mental status is at baseline             Billie Benavides DO  [facility]  2/26/2021 8:52 AM

## 2021-03-10 DIAGNOSIS — Z23 ENCOUNTER FOR IMMUNIZATION: ICD-10-CM

## 2021-03-11 ENCOUNTER — OFFICE VISIT (OUTPATIENT)
Dept: FAMILY MEDICINE CLINIC | Facility: CLINIC | Age: 46
End: 2021-03-11
Payer: OTHER GOVERNMENT

## 2021-03-11 VITALS
BODY MASS INDEX: 26.46 KG/M2 | SYSTOLIC BLOOD PRESSURE: 128 MMHG | HEIGHT: 64 IN | TEMPERATURE: 97 F | OXYGEN SATURATION: 99 % | DIASTOLIC BLOOD PRESSURE: 76 MMHG | HEART RATE: 98 BPM | WEIGHT: 155 LBS

## 2021-03-11 DIAGNOSIS — E11.65 TYPE 2 DIABETES MELLITUS WITH HYPERGLYCEMIA, WITHOUT LONG-TERM CURRENT USE OF INSULIN (HCC): Primary | ICD-10-CM

## 2021-03-11 PROCEDURE — 99213 OFFICE O/P EST LOW 20 MIN: CPT | Performed by: FAMILY MEDICINE

## 2021-03-11 RX ORDER — INSULIN GLARGINE 100 [IU]/ML
10 INJECTION, SOLUTION SUBCUTANEOUS
Qty: 15 ML | Refills: 2 | Status: CANCELLED | OUTPATIENT
Start: 2021-03-11

## 2021-03-11 RX ORDER — PIOGLITAZONEHYDROCHLORIDE 15 MG/1
15 TABLET ORAL DAILY
Qty: 30 TABLET | Refills: 2 | Status: SHIPPED | OUTPATIENT
Start: 2021-03-11 | End: 2021-04-12

## 2021-03-11 NOTE — PROGRESS NOTES
Assessment/Plan:    No problem-specific Assessment & Plan notes found for this encounter  Diagnoses and all orders for this visit:    Type 2 diabetes mellitus with hyperglycemia, with long-term current use of insulin (HCC)  -     pioglitazone (ACTOS) 15 mg tablet; Take 1 tablet (15 mg total) by mouth daily      Working to avoid insulin  Cannot do GLP-1 due to cost and elevated triglycerides  Cannot do SGLT-2 due to cost  Currently on Metformin 1000 mg BID  Will start Actos 15 mg daily and follow up in 2 weeks with blood glucose logs  Subjective:      Patient ID: Mari Vickers is a 41 y o  female  HPI     Patient presents to the office from DM follow up  She ran out of the empagliflozin, was not able to refill due to cost  Was not able to start GLP-1 due to elevated triglycerides  Her last Hgb A1c was 9 5 on 1/26/21  Notes that her blood glucose has been elevated in the high 200-300s consistently  Denies any symptoms of hyperglycemia  Denies increased urination  She has colonoscopy scheduled with GI for 3/15/21  The following portions of the patient's history were reviewed and updated as appropriate: allergies, current medications, past family history, past medical history, past social history, past surgical history and problem list     Review of Systems   Constitutional: Negative for activity change, appetite change, fatigue and fever  HENT: Positive for congestion  Negative for ear pain, postnasal drip, rhinorrhea and sore throat  Eyes: Negative for pain  Respiratory: Negative for cough and shortness of breath  Cardiovascular: Negative for chest pain and leg swelling  Gastrointestinal: Positive for abdominal pain (chronic), constipation (chronic, intermittent) and diarrhea (chronic, intermittent)  Negative for nausea and vomiting  Endocrine: Negative for polyuria  Genitourinary: Negative for difficulty urinating, dysuria, frequency, hematuria and urgency  Skin: Negative for rash  Neurological: Positive for headaches  Negative for dizziness and light-headedness  Objective:    /76 (BP Location: Left arm, Patient Position: Sitting, Cuff Size: Adult)   Pulse 98   Temp (!) 97 °F (36 1 °C)   Ht 5' 4" (1 626 m)   Wt 70 3 kg (155 lb)   SpO2 99%   BMI 26 61 kg/m²      Physical Exam  Vitals signs reviewed  Constitutional:       General: She is not in acute distress  Appearance: Normal appearance  HENT:      Head: Normocephalic and atraumatic  Right Ear: External ear normal       Left Ear: External ear normal       Nose: Nose normal       Mouth/Throat:      Mouth: Mucous membranes are moist    Eyes:      Extraocular Movements: Extraocular movements intact  Conjunctiva/sclera: Conjunctivae normal    Cardiovascular:      Rate and Rhythm: Normal rate and regular rhythm  Heart sounds: Normal heart sounds  Pulmonary:      Effort: Pulmonary effort is normal       Breath sounds: Normal breath sounds  No wheezing, rhonchi or rales  Abdominal:      General: Bowel sounds are normal       Palpations: Abdomen is soft  Tenderness: There is abdominal tenderness (generalized, chronic)  Musculoskeletal:      Right lower leg: No edema  Left lower leg: No edema  Skin:     General: Skin is warm  Capillary Refill: Capillary refill takes less than 2 seconds  Neurological:      Mental Status: She is alert  Mental status is at baseline           Brittaney Hughes DO  [facility]  3/11/2021 9:42 AM

## 2021-03-13 ENCOUNTER — APPOINTMENT (OUTPATIENT)
Dept: LAB | Facility: HOSPITAL | Age: 46
End: 2021-03-13
Payer: OTHER GOVERNMENT

## 2021-03-13 DIAGNOSIS — R63.4 WEIGHT LOSS: ICD-10-CM

## 2021-03-13 DIAGNOSIS — R10.9 ABDOMINAL CRAMPING: ICD-10-CM

## 2021-03-13 DIAGNOSIS — R19.7 DIARRHEA, UNSPECIFIED TYPE: ICD-10-CM

## 2021-03-13 PROCEDURE — 87493 C DIFF AMPLIFIED PROBE: CPT

## 2021-03-13 PROCEDURE — 87505 NFCT AGENT DETECTION GI: CPT

## 2021-03-13 PROCEDURE — 87205 SMEAR GRAM STAIN: CPT

## 2021-03-13 PROCEDURE — 82656 EL-1 FECAL QUAL/SEMIQ: CPT

## 2021-03-13 PROCEDURE — 83993 ASSAY FOR CALPROTECTIN FECAL: CPT

## 2021-03-14 LAB
C DIFF TOX B TCDB STL QL NAA+PROBE: NEGATIVE
WBC STL QL MICRO: NORMAL

## 2021-03-15 ENCOUNTER — ANESTHESIA (OUTPATIENT)
Dept: GASTROENTEROLOGY | Facility: HOSPITAL | Age: 46
End: 2021-03-15

## 2021-03-15 ENCOUNTER — TELEPHONE (OUTPATIENT)
Dept: GASTROENTEROLOGY | Facility: CLINIC | Age: 46
End: 2021-03-15

## 2021-03-15 ENCOUNTER — ANESTHESIA EVENT (OUTPATIENT)
Dept: GASTROENTEROLOGY | Facility: HOSPITAL | Age: 46
End: 2021-03-15

## 2021-03-15 ENCOUNTER — HOSPITAL ENCOUNTER (OUTPATIENT)
Dept: GASTROENTEROLOGY | Facility: HOSPITAL | Age: 46
Setting detail: OUTPATIENT SURGERY
Discharge: HOME | End: 2021-03-15
Attending: INTERNAL MEDICINE
Payer: OTHER GOVERNMENT

## 2021-03-15 VITALS
BODY MASS INDEX: 25.33 KG/M2 | TEMPERATURE: 97 F | HEART RATE: 93 BPM | OXYGEN SATURATION: 100 % | SYSTOLIC BLOOD PRESSURE: 119 MMHG | WEIGHT: 148.37 LBS | RESPIRATION RATE: 14 BRPM | HEIGHT: 64 IN | DIASTOLIC BLOOD PRESSURE: 84 MMHG

## 2021-03-15 DIAGNOSIS — Z86.010 HISTORY OF COLON POLYPS: ICD-10-CM

## 2021-03-15 DIAGNOSIS — K58.0 IRRITABLE BOWEL SYNDROME WITH DIARRHEA: Primary | ICD-10-CM

## 2021-03-15 DIAGNOSIS — K21.9 GASTROESOPHAGEAL REFLUX DISEASE, UNSPECIFIED WHETHER ESOPHAGITIS PRESENT: ICD-10-CM

## 2021-03-15 DIAGNOSIS — R11.0 NAUSEA: ICD-10-CM

## 2021-03-15 DIAGNOSIS — R19.7 DIARRHEA, UNSPECIFIED TYPE: ICD-10-CM

## 2021-03-15 DIAGNOSIS — R10.9 ABDOMINAL CRAMPING: ICD-10-CM

## 2021-03-15 DIAGNOSIS — R63.4 WEIGHT LOSS: ICD-10-CM

## 2021-03-15 LAB
CAMPYLOBACTER DNA SPEC NAA+PROBE: NORMAL
EXT PREGNANCY TEST URINE: NEGATIVE
EXT. CONTROL: NORMAL
GLUCOSE SERPL-MCNC: 257 MG/DL (ref 65–140)
SALMONELLA DNA SPEC QL NAA+PROBE: NORMAL
SHIGA TOXIN STX GENE SPEC NAA+PROBE: NORMAL
SHIGELLA DNA SPEC QL NAA+PROBE: NORMAL

## 2021-03-15 PROCEDURE — 82948 REAGENT STRIP/BLOOD GLUCOSE: CPT

## 2021-03-15 PROCEDURE — 88305 TISSUE EXAM BY PATHOLOGIST: CPT | Performed by: PATHOLOGY

## 2021-03-15 PROCEDURE — 81025 URINE PREGNANCY TEST: CPT | Performed by: ANESTHESIOLOGY

## 2021-03-15 PROCEDURE — 45380 COLONOSCOPY AND BIOPSY: CPT | Performed by: INTERNAL MEDICINE

## 2021-03-15 PROCEDURE — 43239 EGD BIOPSY SINGLE/MULTIPLE: CPT | Performed by: INTERNAL MEDICINE

## 2021-03-15 RX ORDER — SODIUM CHLORIDE, SODIUM LACTATE, POTASSIUM CHLORIDE, CALCIUM CHLORIDE 600; 310; 30; 20 MG/100ML; MG/100ML; MG/100ML; MG/100ML
125 INJECTION, SOLUTION INTRAVENOUS CONTINUOUS
Status: DISCONTINUED | OUTPATIENT
Start: 2021-03-15 | End: 2021-03-19 | Stop reason: HOSPADM

## 2021-03-15 RX ORDER — PROPOFOL 10 MG/ML
INJECTION, EMULSION INTRAVENOUS AS NEEDED
Status: DISCONTINUED | OUTPATIENT
Start: 2021-03-15 | End: 2021-03-15

## 2021-03-15 RX ORDER — LIDOCAINE HYDROCHLORIDE 20 MG/ML
INJECTION, SOLUTION EPIDURAL; INFILTRATION; INTRACAUDAL; PERINEURAL AS NEEDED
Status: DISCONTINUED | OUTPATIENT
Start: 2021-03-15 | End: 2021-03-15

## 2021-03-15 RX ADMIN — SODIUM CHLORIDE, SODIUM LACTATE, POTASSIUM CHLORIDE, AND CALCIUM CHLORIDE: .6; .31; .03; .02 INJECTION, SOLUTION INTRAVENOUS at 13:00

## 2021-03-15 RX ADMIN — LIDOCAINE HYDROCHLORIDE 100 MG: 20 INJECTION, SOLUTION EPIDURAL; INFILTRATION; INTRACAUDAL; PERINEURAL at 13:10

## 2021-03-15 RX ADMIN — PROPOFOL 20 MG: 10 INJECTION, EMULSION INTRAVENOUS at 13:15

## 2021-03-15 RX ADMIN — PROPOFOL 150 MG: 10 INJECTION, EMULSION INTRAVENOUS at 13:11

## 2021-03-15 RX ADMIN — PROPOFOL 30 MG: 10 INJECTION, EMULSION INTRAVENOUS at 13:13

## 2021-03-15 RX ADMIN — PROPOFOL 20 MG: 10 INJECTION, EMULSION INTRAVENOUS at 13:22

## 2021-03-15 RX ADMIN — PROPOFOL 30 MG: 10 INJECTION, EMULSION INTRAVENOUS at 13:17

## 2021-03-15 RX ADMIN — PROPOFOL 20 MG: 10 INJECTION, EMULSION INTRAVENOUS at 13:19

## 2021-03-15 NOTE — TELEPHONE ENCOUNTER
Called [facility] for insurance info    [address]  [address]   [place] [id number]  PHONE NUMBER [phone]

## 2021-03-15 NOTE — DISCHARGE INSTRUCTIONS
Upper Endoscopy   WHAT YOU NEED TO KNOW:   An upper endoscopy is also called an upper gastrointestinal (GI) endoscopy, or an esophagogastroduodenoscopy (EGD)  You may feel bloated, gassy, or have some abdominal discomfort after your procedure  Your throat may be sore for 24 to 36 hours  You may burp or pass gas from air that is still inside your body  DISCHARGE INSTRUCTIONS:   Call 911 if:   · You have sudden chest pain or trouble breathing  Seek care immediately if:   · You feel dizzy or faint  · You have trouble swallowing  · You have severe throat pain  · Your bowel movements are very dark or black  · Your abdomen is hard and firm and you have severe pain  · You vomit blood  Contact your healthcare provider if:   · You feel full or bloated and cannot burp or pass gas  · You have not had a bowel movement for 3 days after your procedure  · You have neck pain  · You have a fever or chills  · You have nausea or are vomiting  · You have a rash or hives  · You have questions or concerns about your endoscopy  Relieve a sore throat:  Suck on throat lozenges or crushed ice  Gargle with a small amount of warm salt water  Mix 1 teaspoon of salt and 1 cup of warm water to make salt water  Relieve gas and discomfort from bloating:  Lie on your right side with a heating pad on your abdomen  Take short walks to help pass gas  Eat small meals until bloating is relieved  Rest after your procedure:  Do not drive or make important decisions until the day after your procedure  Return to your normal activity as directed  You can usually return to work the day after your procedure  Follow up with your healthcare provider as directed:  Write down your questions so you remember to ask them during your visits  © Copyright [address] Information is for End User's use only and may not be sold, redistributed or otherwise used for commercial purposes   All illustrations and images included in CareNotes® are the copyrighted property of A D A M , Inc  or Radha Tucker   The above information is an  only  It is not intended as medical advice for individual conditions or treatments  Talk to your doctor, nurse or pharmacist before following any medical regimen to see if it is safe and effective for you  Colonoscopy   WHAT YOU NEED TO KNOW:   A colonoscopy is a procedure to examine the inside of your colon (intestine) with a scope  Polyps or tissue growths may have been removed during your colonoscopy  It is normal to feel bloated and to have some abdominal discomfort  You should be passing gas  If you have hemorrhoids or you had polyps removed, you may have a small amount of bleeding  DISCHARGE INSTRUCTIONS:   Seek care immediately if:   · You have a large amount of bright red blood in your bowel movements  · Your abdomen is hard and firm and you have severe pain  · You have sudden trouble breathing  Contact your healthcare provider if:   · You develop a rash or hives  · You have a fever within 24 hours of your procedure       · You have not had a bowel movement for 3 days after your procedure  · You have questions or concerns about your condition or care  Activity:   · Do not lift, strain, or run  for 3 days after your procedure  · Rest after your procedure  You have been given medicine to relax you  Do not  drive or make important decisions until the day after your procedure  Return to your normal activity as directed  · Relieve gas and discomfort from bloating  by lying on your right side with a heating pad on your abdomen  You may need to take short walks to help the gas move out  Eat small meals until bloating is relieved  If you had polyps removed: For 7 days after your procedure:  · Do not  take aspirin  · Do not  go on long car rides    Follow up with your healthcare provider as directed:  Write down your questions so you remember to ask them during your visits  © 2017 [address] is for End User's use only and may not be sold, redistributed or otherwise used for commercial purposes  All illustrations and images included in CareNotes® are the copyrighted property of A D [employer] M [employer] Inc  or Gal Lawrence  The above information is an  only  It is not intended as medical advice for individual conditions or treatments  Talk to your doctor, nurse or pharmacist before following any medical regimen to see if it is safe and effective for you

## 2021-03-15 NOTE — ANESTHESIA POSTPROCEDURE EVALUATION
Post-Op Assessment Note    CV Status:  Stable  Pain Score: 0    Pain management: adequate     Mental Status:  Sleepy   Hydration Status:  Stable   PONV Controlled:  None   Airway Patency:  Patent      Post Op Vitals Reviewed: Yes      Staff: CRNA         No complications documented      BP   1127/81   Temp      Pulse 96   Resp 18   SpO2 100% 2L FM

## 2021-03-15 NOTE — ANESTHESIA PREPROCEDURE EVALUATION
Procedure:  COLONOSCOPY  EGD    Relevant Problems   ENDO   (+) Type 2 diabetes mellitus with hyperglycemia, without long-term current use of insulin (HCC)      PULMONARY   (+) Moderate persistent asthma without complication        Physical Exam    Airway    Mallampati score: II  TM Distance: >3 FB  Neck ROM: full     Dental       Cardiovascular  Cardiovascular exam normal    Pulmonary  Pulmonary exam normal     Other Findings     1  Abdominal cramping  2  Diarrhea, unspecified type  3  Weight loss  4  Nausea  5  Gastroesophageal reflux disease, unspecified whether esophagitis present  She has a very long history of these types of symptoms with a dramatic worsening over the past 2 years  She now reports bloody stools with incontinence, nocturnal symptoms and weight loss  Colonoscopy from 10/2018 showed mild pancolitis with normal biopsies     She needs a complete workup - will plan labs, stool cultures and repeat EGD and colonoscopy  Start Omeprazole daily, Cholestyramine TID and Dicyclomine prn    Anesthesia Plan  ASA Score- 3     Anesthesia Type- IV sedation with anesthesia with ASA Monitors  Additional Monitors:   Airway Plan:           Plan Factors-Exercise tolerance (METS): >4 METS  Chart reviewed  EKG reviewed  Existing labs reviewed  Patient summary reviewed  Induction- intravenous  Postoperative Plan-     Informed Consent- Anesthetic plan and risks discussed with patient  I personally reviewed this patient with the CRNA  Discussed and agreed on the Anesthesia Plan with the CRNA  Laci Garcia

## 2021-03-17 LAB — CALPROTECTIN STL-MCNT: 40 UG/G (ref 0–120)

## 2021-03-19 ENCOUNTER — TELEPHONE (OUTPATIENT)
Dept: GASTROENTEROLOGY | Facility: CLINIC | Age: 46
End: 2021-03-19

## 2021-03-19 NOTE — TELEPHONE ENCOUNTER
----- Message from Karey Carter MD sent at 3/18/2021  1:48 PM EDT -----   Please tell her that her biopsies are all negative

## 2021-03-22 ENCOUNTER — TELEPHONE (OUTPATIENT)
Dept: GASTROENTEROLOGY | Facility: CLINIC | Age: 46
End: 2021-03-22

## 2021-03-22 NOTE — TELEPHONE ENCOUNTER
Spoke with patient advised of negative biospies  Appointment made with Enma Soler 4/13 per Dr Allison Butler

## 2021-03-22 NOTE — TELEPHONE ENCOUNTER
----- Message from Parker Mello MD sent at 3/18/2021  1:48 PM EDT -----   Please tell her that her biopsies are all negative

## 2021-04-12 ENCOUNTER — OFFICE VISIT (OUTPATIENT)
Dept: FAMILY MEDICINE CLINIC | Facility: CLINIC | Age: 46
End: 2021-04-12
Payer: OTHER GOVERNMENT

## 2021-04-12 VITALS
DIASTOLIC BLOOD PRESSURE: 68 MMHG | HEIGHT: 64 IN | BODY MASS INDEX: 25.61 KG/M2 | OXYGEN SATURATION: 96 % | HEART RATE: 100 BPM | WEIGHT: 150 LBS | TEMPERATURE: 98.1 F | SYSTOLIC BLOOD PRESSURE: 108 MMHG

## 2021-04-12 DIAGNOSIS — E11.65 TYPE 2 DIABETES MELLITUS WITH HYPERGLYCEMIA, WITHOUT LONG-TERM CURRENT USE OF INSULIN (HCC): Primary | ICD-10-CM

## 2021-04-12 PROCEDURE — 99213 OFFICE O/P EST LOW 20 MIN: CPT | Performed by: FAMILY MEDICINE

## 2021-04-12 RX ORDER — PIOGLITAZONEHYDROCHLORIDE 30 MG/1
30 TABLET ORAL DAILY
Qty: 30 TABLET | Refills: 1 | Status: SHIPPED | OUTPATIENT
Start: 2021-04-12 | End: 2021-08-02

## 2021-04-12 NOTE — PROGRESS NOTES
Assessment/Plan:    No problem-specific Assessment & Plan notes found for this encounter  Diagnoses and all orders for this visit:    Type 2 diabetes mellitus with hyperglycemia, without long-term current use of insulin (HCC)  Increase Actos to 30 mg daily, patient to records blood sugars and follow up  Discussed potentially starting basal insulin, will follow up in 4 weeks  -     pioglitazone (ACTOS) 30 mg tablet; Take 1 tablet (30 mg total) by mouth daily      Return in about 4 weeks (around 5/10/2021) for f/u DM  Subjective:      Patient ID: Den Lr is a 41 y o  female  HPI    Patient presents to the office for follow up on her DM  She has been taking her Metformin BID and Actos 15 mg daily  Notes that she has been low 200s first thing in the morning  Sugars throughout the day highest was 257  This is an improvement for her  Has follow up with Dr Jose Del Toro on Thursday, waiting for eye exam appointment  She has a mammogram appointment for Thursday  The following portions of the patient's history were reviewed and updated as appropriate: allergies, current medications, past family history, past medical history, past social history, past surgical history and problem list     Review of Systems   Constitutional: Negative for activity change, appetite change, fatigue and fever  HENT: Negative for congestion, ear pain, rhinorrhea and sore throat  Eyes: Negative for pain  Respiratory: Negative for cough and shortness of breath  Cardiovascular: Negative for chest pain and leg swelling  Gastrointestinal: Positive for abdominal pain, constipation and diarrhea  Negative for abdominal distention, blood in stool, nausea and vomiting  Genitourinary: Negative for dysuria, frequency and urgency  Musculoskeletal: Negative for gait problem  Skin: Negative for rash  Neurological: Negative for dizziness, light-headedness and headaches         Objective:    /68 (BP Location: Left arm, Patient Position: Sitting, Cuff Size: Adult)   Pulse 100   Temp 98 1 °F (36 7 °C)   Ht 5' 4" (1 626 m)   Wt 68 kg (150 lb)   SpO2 96%   BMI 25 75 kg/m²      Physical Exam  Vitals signs reviewed  Constitutional:       General: She is not in acute distress  Appearance: Normal appearance  HENT:      Head: Normocephalic and atraumatic  Right Ear: External ear normal       Left Ear: External ear normal       Nose: Nose normal       Mouth/Throat:      Mouth: Mucous membranes are moist    Eyes:      Extraocular Movements: Extraocular movements intact  Conjunctiva/sclera: Conjunctivae normal       Pupils: Pupils are equal, round, and reactive to light  Neck:      Musculoskeletal: Neck supple  Cardiovascular:      Rate and Rhythm: Normal rate and regular rhythm  Heart sounds: Normal heart sounds  Pulmonary:      Effort: Pulmonary effort is normal       Breath sounds: Normal breath sounds  No wheezing, rhonchi or rales  Abdominal:      General: Abdomen is flat  Bowel sounds are normal  There is no distension  Palpations: Abdomen is soft  Tenderness: There is no abdominal tenderness  Musculoskeletal:         General: No deformity  Right lower leg: No edema  Left lower leg: No edema  Lymphadenopathy:      Cervical: No cervical adenopathy  Skin:     General: Skin is warm  Capillary Refill: Capillary refill takes less than 2 seconds  Findings: No rash  Neurological:      Mental Status: She is alert  Mental status is at baseline           Randy Milan DO  [facility]  4/12/2021 12:47 PM

## 2021-04-15 ENCOUNTER — OFFICE VISIT (OUTPATIENT)
Dept: GASTROENTEROLOGY | Facility: CLINIC | Age: 46
End: 2021-04-15
Payer: OTHER GOVERNMENT

## 2021-04-15 ENCOUNTER — IMMUNIZATIONS (OUTPATIENT)
Dept: FAMILY MEDICINE CLINIC | Facility: HOSPITAL | Age: 46
End: 2021-04-15

## 2021-04-15 VITALS
DIASTOLIC BLOOD PRESSURE: 80 MMHG | WEIGHT: 154.4 LBS | BODY MASS INDEX: 26.36 KG/M2 | SYSTOLIC BLOOD PRESSURE: 118 MMHG | HEIGHT: 64 IN | HEART RATE: 104 BPM

## 2021-04-15 DIAGNOSIS — Z23 ENCOUNTER FOR IMMUNIZATION: Primary | ICD-10-CM

## 2021-04-15 DIAGNOSIS — K21.9 GASTROESOPHAGEAL REFLUX DISEASE, UNSPECIFIED WHETHER ESOPHAGITIS PRESENT: ICD-10-CM

## 2021-04-15 DIAGNOSIS — K58.0 IRRITABLE BOWEL SYNDROME WITH DIARRHEA: Primary | ICD-10-CM

## 2021-04-15 PROCEDURE — 0001A SARS-COV-2 / COVID-19 MRNA VACCINE (PFIZER-BIONTECH) 30 MCG: CPT | Performed by: INTERNAL MEDICINE

## 2021-04-15 PROCEDURE — 99213 OFFICE O/P EST LOW 20 MIN: CPT | Performed by: PHYSICIAN ASSISTANT

## 2021-04-15 PROCEDURE — 91300 SARS-COV-2 / COVID-19 MRNA VACCINE (PFIZER-BIONTECH) 30 MCG: CPT | Performed by: INTERNAL MEDICINE

## 2021-04-15 RX ORDER — ESOMEPRAZOLE MAGNESIUM 40 MG/1
40 CAPSULE, DELAYED RELEASE ORAL
Qty: 60 CAPSULE | Refills: 3 | Status: SHIPPED | OUTPATIENT
Start: 2021-04-15 | End: 2022-02-02

## 2021-04-15 NOTE — PROGRESS NOTES
[facility] - Outpatient Follow-up Note  Kinjal Underwood 41 y o  female MRN: [id number]  Encounter: [id number]          ASSESSMENT AND PLAN:      1  Irritable bowel syndrome with diarrhea  Xifaxan course  Conitnue fiber and probiotic  Continue Dicyclomine prn  If no relief will use Lotronex    Reviewed starting fiber and probiotic  Reviewed dietary limitations  Consider trial of GFD or low FODMAP diet    2  Gastroesophageal reflux disease, unspecified whether esophagitis present  Stop Omeprazole as it is offering no relief  Start Esomeprazole 40mg BID    ______________________________________________________________________    SUBJECTIVE:  72-year-old female with diarrhea predominant irritable bowel syndrome and GERD presents for follow-up after EGD and colonoscopy  Both procedures were reported as normal   Biopsies of the stomach, duodenum and colon were negative  She continues to report severe diarrhea  During her office visit she had to get up twice to use the bathroom  She reports that if she knows that she is going somewhere she will fast for the entire day as she does not want to have an accident  She continues to deny rectal bleeding or unexpected weight loss  She denies nocturnal symptoms  She started to utilize cholestyramine but stopped as she read the label thinking it was for cholesterol  She was given a course of Xifaxan after her last colonoscopy however she could not afford as it was 500 dollars  She is taking omeprazole 40 mg twice daily for her acid reflux without relief  She does not believe she has ever been on anything than this  REVIEW OF SYSTEMS IS OTHERWISE NEGATIVE        Historical Information   Past Medical History:   Diagnosis Date    Asthma     Colon polyp     Diabetes mellitus ([address] )     Fibromyalgia     Hyperlipidemia      Past Surgical History:   Procedure Laterality Date     SECTION      CHOLECYSTECTOMY      COLONOSCOPY N/A 10/24/2018    Procedure: COLONOSCOPY;  Surgeon: Zarina Moss MD;  Location: [facility]; Service: Gastroenterology    ESOPHAGOGASTRODUODENOSCOPY N/A 10/26/2018    Procedure: ESOPHAGOGASTRODUODENOSCOPY (EGD); Surgeon: Zarina Moss MD;  Location: [facility]; Service: Gastroenterology    TUBAL LIGATION       Social History   Social History     Substance and Sexual Activity   Alcohol Use No     Social History     Substance and Sexual Activity   Drug Use No     Social History     Tobacco Use   Smoking Status Never Smoker   Smokeless Tobacco Never Used     Family History   Problem Relation Age of Onset    Diabetes Mother        Meds/Allergies       Current Outpatient Medications:     albuterol (PROVENTIL HFA,VENTOLIN HFA) 90 mcg/act inhaler    amitriptyline (ELAVIL) 50 mg tablet    aspirin 81 MG tablet    atorvastatin (LIPITOR) 40 mg tablet    Blood Glucose Monitoring Suppl (ONE TOUCH ULTRA 2) w/Device KIT    cholestyramine (QUESTRAN) 4 g packet    dicyclomine (BENTYL) 20 mg tablet    famotidine (PEPCID) 20 mg tablet    fluticasone-salmeterol (Advair Diskus) 500-50 mcg/dose inhaler    glucose blood test strip    Lancets (ONETOUCH ULTRASOFT) lancets    metFORMIN (GLUCOPHAGE) 1000 MG tablet    montelukast (SINGULAIR) 10 mg tablet    omega-3-acid ethyl esters (LOVAZA) 1 g capsule    omeprazole (PriLOSEC) 40 MG capsule    pioglitazone (ACTOS) 30 mg tablet    No Known Allergies        Objective     Blood pressure 118/80, pulse 104, height 5' 4" (1 626 m), weight 70 kg (154 lb 6 4 oz)  Body mass index is 26 5 kg/m²  PHYSICAL EXAM:      General Appearance:   Alert, cooperative, no distress   HEENT:   Normocephalic, atraumatic, anicteric      Neck:  Supple, symmetrical, trachea midline   Lungs:   Clear to auscultation bilaterally; no rales, rhonchi or wheezing; respirations unlabored    Heart[de-identified]   Regular rate and rhythm; no murmur, rub, or gallop     Abdomen:   Soft, non-tender, non-distended; normal bowel sounds; no masses, no organomegaly    Genitalia:   Deferred    Rectal:   Deferred    Extremities:  No cyanosis, clubbing or edema    Pulses:  2+ and symmetric    Skin:  No jaundice, rashes, or lesions    Lymph nodes:  No palpable cervical lymphadenopathy        Lab Results:   No visits with results within 1 Day(s) from this visit  Latest known visit with results is:   Hospital Outpatient Visit on 03/15/2021   Component Date Value    EXT Preg Test, Ur 03/15/2021 Negative     Control 03/15/2021 Valid     POC Glucose 03/15/2021 257*    Case Report 03/15/2021                      Value:Surgical Pathology Report                         Case: [id number]                                   Authorizing Provider:  Heather Day MD   Collected:           03/15/2021 1311              Ordering Location:      [facility]       Received:            03/15/2021 [address] [place] [address] Endoscopy                                                             Pathologist:           Luz Castaneda DO                                                     Specimens:   A) - Duodenum                                                                                       B) - Stomach                                                                                        C) - Colon, random colon                                                                   Final Diagnosis 03/15/2021                      Value: This result contains rich text formatting which cannot be displayed here   Note 03/15/2021                      Value: This result contains rich text formatting which cannot be displayed here   Additional Information 03/15/2021                      Value: This result contains rich text formatting which cannot be displayed here  Isabela Zuniga Gross Description 03/15/2021                      Value: This result contains rich text formatting which cannot be displayed here      Clinical Information 03/15/2021                      Value:Cold bx r/o celiac sprue         Radiology Results:   No results found

## 2021-04-19 ENCOUNTER — HOSPITAL ENCOUNTER (OUTPATIENT)
Dept: MAMMOGRAPHY | Facility: CLINIC | Age: 46
Discharge: HOME | End: 2021-04-19
Payer: OTHER GOVERNMENT

## 2021-04-19 VITALS — HEIGHT: 64 IN | BODY MASS INDEX: 26.29 KG/M2 | WEIGHT: 154 LBS

## 2021-04-19 DIAGNOSIS — Z12.31 ENCOUNTER FOR SCREENING MAMMOGRAM FOR BREAST CANCER: ICD-10-CM

## 2021-04-19 PROCEDURE — 77063 BREAST TOMOSYNTHESIS BI: CPT

## 2021-04-19 PROCEDURE — 77067 SCR MAMMO BI INCL CAD: CPT

## 2021-05-10 ENCOUNTER — IMMUNIZATIONS (OUTPATIENT)
Dept: FAMILY MEDICINE CLINIC | Facility: HOSPITAL | Age: 46
End: 2021-05-10

## 2021-05-10 DIAGNOSIS — Z23 ENCOUNTER FOR IMMUNIZATION: Primary | ICD-10-CM

## 2021-05-10 PROCEDURE — 91300 SARS-COV-2 / COVID-19 MRNA VACCINE (PFIZER-BIONTECH) 30 MCG: CPT

## 2021-05-10 PROCEDURE — 0002A SARS-COV-2 / COVID-19 MRNA VACCINE (PFIZER-BIONTECH) 30 MCG: CPT

## 2021-05-12 ENCOUNTER — OFFICE VISIT (OUTPATIENT)
Dept: GASTROENTEROLOGY | Facility: CLINIC | Age: 46
End: 2021-05-12
Payer: OTHER GOVERNMENT

## 2021-05-12 ENCOUNTER — OFFICE VISIT (OUTPATIENT)
Dept: FAMILY MEDICINE CLINIC | Facility: CLINIC | Age: 46
End: 2021-05-12
Payer: OTHER GOVERNMENT

## 2021-05-12 VITALS
DIASTOLIC BLOOD PRESSURE: 80 MMHG | BODY MASS INDEX: 25.95 KG/M2 | HEIGHT: 64 IN | HEART RATE: 102 BPM | WEIGHT: 152 LBS | RESPIRATION RATE: 12 BRPM | SYSTOLIC BLOOD PRESSURE: 116 MMHG

## 2021-05-12 VITALS
DIASTOLIC BLOOD PRESSURE: 82 MMHG | WEIGHT: 150 LBS | HEIGHT: 64 IN | BODY MASS INDEX: 25.61 KG/M2 | OXYGEN SATURATION: 95 % | HEART RATE: 102 BPM | SYSTOLIC BLOOD PRESSURE: 116 MMHG | TEMPERATURE: 96.7 F

## 2021-05-12 DIAGNOSIS — K21.9 GASTROESOPHAGEAL REFLUX DISEASE, UNSPECIFIED WHETHER ESOPHAGITIS PRESENT: ICD-10-CM

## 2021-05-12 DIAGNOSIS — E11.65 TYPE 2 DIABETES MELLITUS WITH HYPERGLYCEMIA, WITHOUT LONG-TERM CURRENT USE OF INSULIN (HCC): Primary | ICD-10-CM

## 2021-05-12 DIAGNOSIS — K58.0 IRRITABLE BOWEL SYNDROME WITH DIARRHEA: Primary | ICD-10-CM

## 2021-05-12 LAB — SL AMB POCT HEMOGLOBIN AIC: 10.2 (ref ?–6.5)

## 2021-05-12 PROCEDURE — 83036 HEMOGLOBIN GLYCOSYLATED A1C: CPT | Performed by: FAMILY MEDICINE

## 2021-05-12 PROCEDURE — 99213 OFFICE O/P EST LOW 20 MIN: CPT | Performed by: PHYSICIAN ASSISTANT

## 2021-05-12 PROCEDURE — 99213 OFFICE O/P EST LOW 20 MIN: CPT | Performed by: FAMILY MEDICINE

## 2021-05-12 RX ORDER — IBUPROFEN 600 MG/1
1 TABLET ORAL AS NEEDED
Qty: 1 KIT | Refills: 2 | Status: SHIPPED | OUTPATIENT
Start: 2021-05-12 | End: 2022-02-02

## 2021-05-12 RX ORDER — ALOSETRON HYDROCHLORIDE 1 MG/1
1 TABLET, FILM COATED ORAL DAILY
Qty: 30 TABLET | Refills: 3 | Status: SHIPPED | OUTPATIENT
Start: 2021-05-12 | End: 2021-06-23

## 2021-05-12 RX ORDER — INSULIN GLARGINE 100 [IU]/ML
10 INJECTION, SOLUTION SUBCUTANEOUS
Qty: 15 ML | Refills: 2 | Status: SHIPPED | OUTPATIENT
Start: 2021-05-12 | End: 2021-06-29

## 2021-05-12 NOTE — PROGRESS NOTES
[facility] - Outpatient Follow-up Note  Mary Howard 41 y o  female MRN: [id number]  Encounter: [id number]          ASSESSMENT AND PLAN:      1  Irritable bowel syndrome with diarrhea  No improvement with Xifaxan  Will start Lotronex 1mg daily    2  Gastroesophageal reflux disease, unspecified whether esophagitis present  Only slight improvement with switch to Esomeprazole 40mg BID  Consider adding Pepcid, consider GES, consider low dose reglan    ______________________________________________________________________    SUBJECTIVE:  Year old female with diarrhea predominant irritable bowel syndrome and GERD presents for follow-up  Unfortunately, she reports no improvement with the Xifaxan course  She continues with multiple watery stools daily with abdominal cramping and bloating  She also continues with acid reflux symptoms despite switching her omeprazole to as omeprazole  She denies dysphagia, hematemesis, melena or unexpected weight loss  She has no rectal bleeding  She denies any fevers or chills  She is trying to be cautious with her diet to avoid fatty foods, spicy foods and dairy  REVIEW OF SYSTEMS IS OTHERWISE NEGATIVE  Historical Information   Past Medical History:   Diagnosis Date    Asthma     Colon polyp     Diabetes mellitus ([address] )     Fibromyalgia     Hyperlipidemia      Past Surgical History:   Procedure Laterality Date     SECTION      CHOLECYSTECTOMY      COLONOSCOPY N/A 10/24/2018    Procedure: COLONOSCOPY;  Surgeon: Handy Hendricks MD;  Location[facility]; Service: Gastroenterology    ESOPHAGOGASTRODUODENOSCOPY N/A 10/26/2018    Procedure: ESOPHAGOGASTRODUODENOSCOPY (EGD); Surgeon: Handy Hendricks MD;  Location: [facility];   Service: Gastroenterology    TUBAL LIGATION       Social History   Social History     Substance and Sexual Activity   Alcohol Use No     Social History     Substance and Sexual Activity   Drug Use No     Social History     Tobacco Use   Smoking Status Never Smoker   Smokeless Tobacco Never Used     Family History   Problem Relation Age of Onset    Diabetes Mother     No Known Problems Sister     No Known Problems Sister     No Known Problems Maternal Aunt     No Known Problems Maternal Aunt     No Known Problems Maternal Aunt     No Known Problems Maternal Aunt     No Known Problems Maternal Aunt     Cancer Maternal Aunt     Breast cancer Cousin 44    Prostate cancer Maternal Uncle     Prostate cancer Maternal Uncle     Cancer Maternal Uncle        Meds/Allergies       Current Outpatient Medications:     albuterol (PROVENTIL HFA,VENTOLIN HFA) 90 mcg/act inhaler    amitriptyline (ELAVIL) 50 mg tablet    atorvastatin (LIPITOR) 40 mg tablet    Blood Glucose Monitoring Suppl (ONE TOUCH ULTRA 2) w/Device KIT    cholestyramine (QUESTRAN) 4 g packet    dicyclomine (BENTYL) 20 mg tablet    esomeprazole (NexIUM) 40 MG capsule    famotidine (PEPCID) 20 mg tablet    fluticasone-salmeterol (Advair Diskus) 500-50 mcg/dose inhaler    Glucagon, rDNA, (Glucagon Emergency) 1 MG KIT    glucose blood test strip    insulin glargine (Lantus SoloStar) 100 units/mL injection pen    Lancets (ONETOUCH ULTRASOFT) lancets    metFORMIN (GLUCOPHAGE) 1000 MG tablet    montelukast (SINGULAIR) 10 mg tablet    omega-3-acid ethyl esters (LOVAZA) 1 g capsule    omeprazole (PriLOSEC) 40 MG capsule    pioglitazone (ACTOS) 30 mg tablet    alosetron (LOTRONEX) 1 MG tablet    aspirin 81 MG tablet    No Known Allergies        Objective     Blood pressure 116/80, pulse 102, resp  rate 12, height 5' 4" (1 626 m), weight 68 9 kg (152 lb), last menstrual period 04/19/2021, not currently breastfeeding  Body mass index is 26 09 kg/m²        PHYSICAL EXAM:      General Appearance:   Alert, cooperative, no distress   HEENT:   Normocephalic, atraumatic, anicteric      Neck:  Supple, symmetrical, trachea midline   Lungs:   Clear to auscultation bilaterally; no rales, rhonchi or wheezing; respirations unlabored    Heart[de-identified]   Regular rate and rhythm; no murmur, rub, or gallop  Abdomen:   Soft, non-tender, non-distended; normal bowel sounds; no masses, no organomegaly    Genitalia:   Deferred    Rectal:   Deferred    Extremities:  No cyanosis, clubbing or edema    Pulses:  2+ and symmetric    Skin:  No jaundice, rashes, or lesions    Lymph nodes:  No palpable cervical lymphadenopathy        Lab Results:   Office Visit on 05/12/2021   Component Date Value    Hemoglobin A1C 05/12/2021 10 2*         Radiology Results:   Mammo Screening Bilateral W 3d & Cad    Result Date: 4/20/2021  Narrative: DIAGNOSIS: Encounter for screening mammogram for breast cancer TECHNIQUE: Digital screening mammography was performed  Computer Aided Detection (CAD) analyzed all applicable images  COMPARISONS: Prior breast imaging dated: 09/20/2018 RELEVANT HISTORY: Family Breast Cancer History: History of breast cancer in Cousin  Family Medical History: Family medical history includes breast cancer in cousin  Personal History: No known relevant hormone history  No known relevant surgical history  No known relevant medical history  The patient is scheduled in a reminder system for screening mammography  8-10% of cancers will be missed on mammography  Management of a palpable abnormality must be based on clinical grounds  Patients will be notified of their results via letter from our facility  Accredited by [employer] of [employer] and FDA  RISK ASSESSMENT: 5 Year Tyrer-Cuzick: 0 32 % 10 Year Tyrer-Cuzick: 0 76 % Lifetime Tyrer-Cuzick: 4 69 % TISSUE DENSITY: There are scattered areas of fibroglandular density  INDICATION: Carmelia Cooks is a 41 y o  female presenting for screening mammography  FINDINGS: There are no suspicious masses, grouped microcalcifications or areas of architectural distortion  The skin and nipple areolar complex are unremarkable       Impression: No mammographic evidence of malignancy  ASSESSMENT/BI-RADS CATEGORY: Left: 1 - Negative Right: 1 - Negative Overall: 1 - Negative RECOMMENDATION:      - Routine screening mammogram in 1 year for both breasts   Workstation ID: [id number]

## 2021-05-12 NOTE — PROGRESS NOTES
Assessment/Plan:    No problem-specific Assessment & Plan notes found for this encounter  Diagnoses and all orders for this visit:    Type 2 diabetes mellitus with hyperglycemia, without long-term current use of insulin (HCC)  -     POCT hemoglobin A1c  -     insulin glargine (Lantus SoloStar) 100 units/mL injection pen; Inject 10 Units under the skin daily at bedtime  -     Glucagon, rDNA, (Glucagon Emergency) 1 MG KIT; Inject 1 kit as directed as needed (hypoglycemia)        Hgb A1c of 10 2, increased from 9 5  Concerns about compliance issues  Continue Actos and the metformin and will start Lantus 10 units nightly  Record blood sugars  Follow up in 2 weeks with Blood sugars  Subjective:      Patient ID: Yordy Perez is a 41 y o  female  HPI     Patient presents to the office for DM follow up  She was last seen about 1 month ago  Her Actos was increased to 30 mg daily, she is tolerating well  Continues on the Metformin 1000 mg BID  Checking blood sugars, no log but she reports that they are always in the 200s, mid to high  She has not made any dietary changes or exercise changes  She has previously been on insulin, basal and bolus  Hgb A1c of 10 2 today  The following portions of the patient's history were reviewed and updated as appropriate: allergies, current medications, past family history, past medical history, past social history, past surgical history and problem list     Review of Systems   Constitutional: Negative for chills, fatigue and fever  HENT: Negative for congestion, ear pain, rhinorrhea and sore throat  Respiratory: Negative for cough and shortness of breath  Cardiovascular: Negative for chest pain and leg swelling  Gastrointestinal: Positive for abdominal pain (at baseline)  Negative for abdominal distention, constipation, diarrhea, nausea and vomiting  Genitourinary: Negative for dysuria, frequency and urgency  Skin: Negative for rash     Neurological: Negative for dizziness, light-headedness and headaches  Objective:    /82 (BP Location: Left arm, Patient Position: Sitting, Cuff Size: Adult)   Pulse 102   Temp (!) 96 7 °F (35 9 °C)   Ht 5' 4" (1 626 m)   Wt 68 kg (150 lb)   LMP 04/19/2021 (Exact Date)   SpO2 95%   BMI 25 75 kg/m²      Physical Exam  Vitals signs reviewed  Constitutional:       General: She is not in acute distress  Appearance: Normal appearance  HENT:      Head: Normocephalic and atraumatic  Right Ear: External ear normal       Left Ear: External ear normal       Mouth/Throat:      Mouth: Mucous membranes are moist    Eyes:      Extraocular Movements: Extraocular movements intact  Conjunctiva/sclera: Conjunctivae normal    Cardiovascular:      Rate and Rhythm: Normal rate and regular rhythm  Heart sounds: Normal heart sounds  Comments: HR 90  Pulmonary:      Effort: Pulmonary effort is normal       Breath sounds: No wheezing, rhonchi or rales  Skin:     Capillary Refill: Capillary refill takes less than 2 seconds  Neurological:      Mental Status: She is alert  Mental status is at baseline           Porter Love DO  [facility]  5/12/2021 10:36 AM

## 2021-05-13 DIAGNOSIS — E11.65 TYPE 2 DIABETES MELLITUS WITH HYPERGLYCEMIA, WITHOUT LONG-TERM CURRENT USE OF INSULIN (HCC): Primary | ICD-10-CM

## 2021-06-01 LAB
LEFT EYE DIABETIC RETINOPATHY: NORMAL
RIGHT EYE DIABETIC RETINOPATHY: NORMAL

## 2021-06-23 ENCOUNTER — OFFICE VISIT (OUTPATIENT)
Dept: GASTROENTEROLOGY | Facility: CLINIC | Age: 46
End: 2021-06-23
Payer: OTHER GOVERNMENT

## 2021-06-23 VITALS
DIASTOLIC BLOOD PRESSURE: 68 MMHG | BODY MASS INDEX: 25.88 KG/M2 | HEIGHT: 64 IN | HEART RATE: 68 BPM | SYSTOLIC BLOOD PRESSURE: 90 MMHG | WEIGHT: 151.6 LBS

## 2021-06-23 DIAGNOSIS — R19.7 DIARRHEA, UNSPECIFIED TYPE: Primary | ICD-10-CM

## 2021-06-23 PROCEDURE — 99213 OFFICE O/P EST LOW 20 MIN: CPT | Performed by: PHYSICIAN ASSISTANT

## 2021-06-23 NOTE — PROGRESS NOTES
[facility] - Outpatient Follow-up Note  Jennie Rodriguez 40 y o  female MRN: [id number]  Encounter: [id number]          ASSESSMENT AND PLAN:      1  Diarrhea, unspecified type  Worsening over the past 2 weeks  Seemed to get worse after returning from the [place]  Will repeat stool cultures  Stop lotronex  Start Imodium 1 BID - modify dose as necessary  Use dicyclomine QID for pain  Avoid Viberzi for now as she is s/p cholecystectomy    ______________________________________________________________________    SUBJECTIVE:  Year old female with chronic diarrhea presents for  Unfortunately, after her last appointment her symptoms seem to worsen  She is taking Lotronex 1 mg daily  She reports that not only use this medicine expensive it does not seem to be doing anything  She reports her symptoms acutely worsened after she returned from trip to the [place] in [place]  She admits ongoing abdominal cramping and bloating  She is reporting incontinence episodes  She denies any rectal bleeding or weight loss  She has no nausea or vomiting  She is being cautious with her diet but is tolerating a diet  Unfortunately, her blood sugars remain poorly controlled  Her recent hemoglobin A1c was 10  She reports that this is improving  She has previously failed treatment with Questran, Colestid and Xifaxan  REVIEW OF SYSTEMS IS OTHERWISE NEGATIVE  Historical Information   Past Medical History:   Diagnosis Date    Asthma     Colon polyp     Diabetes mellitus [address] [address] )     Fibromyalgia     Hyperlipidemia      Past Surgical History:   Procedure Laterality Date     SECTION      CHOLECYSTECTOMY      COLONOSCOPY N/A 10/24/2018    Procedure: COLONOSCOPY;  Surgeon: Molly Arango MD;  [facility]; Service: Gastroenterology    ESOPHAGOGASTRODUODENOSCOPY N/A 10/26/2018    Procedure: ESOPHAGOGASTRODUODENOSCOPY (EGD); Surgeon: Molly Arango MD;  [facility];   Service: Gastroenterology    TUBAL LIGATION       Social History   Social History     Substance and Sexual Activity   Alcohol Use No     Social History     Substance and Sexual Activity   Drug Use No     Social History     Tobacco Use   Smoking Status Never Smoker   Smokeless Tobacco Never Used     Family History   Problem Relation Age of Onset    Diabetes Mother     No Known Problems Sister     No Known Problems Sister     No Known Problems Maternal Aunt     No Known Problems Maternal Aunt     No Known Problems Maternal Aunt     No Known Problems Maternal Aunt     No Known Problems Maternal Aunt     Cancer Maternal Aunt     Breast cancer Cousin 44    Prostate cancer Maternal Uncle     Prostate cancer Maternal Uncle     Cancer Maternal Uncle        Meds/Allergies       Current Outpatient Medications:     albuterol (PROVENTIL HFA,VENTOLIN HFA) 90 mcg/act inhaler    amitriptyline (ELAVIL) 50 mg tablet    aspirin 81 MG tablet    atorvastatin (LIPITOR) 40 mg tablet    Blood Glucose Monitoring Suppl (ONE TOUCH ULTRA 2) w/Device KIT    cholestyramine (QUESTRAN) 4 g packet    dicyclomine (BENTYL) 20 mg tablet    esomeprazole (NexIUM) 40 MG capsule    famotidine (PEPCID) 20 mg tablet    fluticasone-salmeterol (Advair Diskus) 500-50 mcg/dose inhaler    Glucagon, rDNA, (Glucagon Emergency) 1 MG KIT    glucose blood test strip    insulin glargine (Lantus SoloStar) 100 units/mL injection pen    Insulin Pen Needle 31G X 5 MM MISC    Lancets (ONETOUCH ULTRASOFT) lancets    metFORMIN (GLUCOPHAGE) 1000 MG tablet    montelukast (SINGULAIR) 10 mg tablet    omega-3-acid ethyl esters (LOVAZA) 1 g capsule    omeprazole (PriLOSEC) 40 MG capsule    pioglitazone (ACTOS) 30 mg tablet    No Known Allergies        Objective     Blood pressure 90/68, pulse 68, height 5' 4" (1 626 m), weight 68 8 kg (151 lb 9 6 oz), not currently breastfeeding  Body mass index is 26 02 kg/m²        PHYSICAL EXAM:      General Appearance:   Alert, cooperative, no distress   HEENT:   Normocephalic, atraumatic, anicteric      Neck:  Supple, symmetrical, trachea midline   Lungs:   Clear to auscultation bilaterally; no rales, rhonchi or wheezing; respirations unlabored    Heart[de-identified]   Regular rate and rhythm; no murmur, rub, or gallop  Abdomen:   Soft, non-tender, non-distended; normal bowel sounds; no masses, no organomegaly    Genitalia:   Deferred    Rectal:   Deferred    Extremities:  No cyanosis, clubbing or edema    Pulses:  2+ and symmetric    Skin:  No jaundice, rashes, or lesions    Lymph nodes:  No palpable cervical lymphadenopathy        Lab Results:   No visits with results within 1 Day(s) from this visit  Latest known visit with results is:   Office Visit on 05/12/2021   Component Date Value    Hemoglobin A1C 05/12/2021 10 2*         Radiology Results:   No results found

## 2021-06-26 ENCOUNTER — APPOINTMENT (OUTPATIENT)
Dept: LAB | Facility: HOSPITAL | Age: 46
End: 2021-06-26
Payer: OTHER GOVERNMENT

## 2021-06-26 DIAGNOSIS — R10.9 ABDOMINAL CRAMPING: ICD-10-CM

## 2021-06-26 DIAGNOSIS — R63.4 WEIGHT LOSS: ICD-10-CM

## 2021-06-26 DIAGNOSIS — R19.7 DIARRHEA, UNSPECIFIED TYPE: ICD-10-CM

## 2021-06-26 LAB
ALBUMIN SERPL BCP-MCNC: 3.2 G/DL (ref 3.5–5)
ALP SERPL-CCNC: 62 U/L (ref 46–116)
ALT SERPL W P-5'-P-CCNC: 25 U/L (ref 12–78)
ANION GAP SERPL CALCULATED.3IONS-SCNC: 7 MMOL/L (ref 4–13)
AST SERPL W P-5'-P-CCNC: 14 U/L (ref 5–45)
BASOPHILS # BLD AUTO: 0.07 THOUSANDS/ΜL (ref 0–0.1)
BASOPHILS NFR BLD AUTO: 1 % (ref 0–1)
BILIRUB SERPL-MCNC: 0.64 MG/DL (ref 0.2–1)
BUN SERPL-MCNC: 14 MG/DL (ref 5–25)
CALCIUM ALBUM COR SERPL-MCNC: 9.3 MG/DL (ref 8.3–10.1)
CALCIUM SERPL-MCNC: 8.7 MG/DL (ref 8.3–10.1)
CHLORIDE SERPL-SCNC: 102 MMOL/L (ref 100–108)
CO2 SERPL-SCNC: 27 MMOL/L (ref 21–32)
CREAT SERPL-MCNC: 0.79 MG/DL (ref 0.6–1.3)
EOSINOPHIL # BLD AUTO: 0.13 THOUSAND/ΜL (ref 0–0.61)
EOSINOPHIL NFR BLD AUTO: 2 % (ref 0–6)
ERYTHROCYTE [DISTWIDTH] IN BLOOD BY AUTOMATED COUNT: 11.7 % (ref 11.6–15.1)
ERYTHROCYTE [SEDIMENTATION RATE] IN BLOOD: 3 MM/HOUR (ref 0–19)
GFR SERPL CREATININE-BSD FRML MDRD: 91 ML/MIN/1.73SQ M
GLUCOSE P FAST SERPL-MCNC: 304 MG/DL (ref 65–99)
HCT VFR BLD AUTO: 39.2 % (ref 34.8–46.1)
HGB BLD-MCNC: 13.9 G/DL (ref 11.5–15.4)
IMM GRANULOCYTES # BLD AUTO: 0.05 THOUSAND/UL (ref 0–0.2)
IMM GRANULOCYTES NFR BLD AUTO: 1 % (ref 0–2)
LYMPHOCYTES # BLD AUTO: 2 THOUSANDS/ΜL (ref 0.6–4.47)
LYMPHOCYTES NFR BLD AUTO: 32 % (ref 14–44)
MCH RBC QN AUTO: 32.6 PG (ref 26.8–34.3)
MCHC RBC AUTO-ENTMCNC: 35.5 G/DL (ref 31.4–37.4)
MCV RBC AUTO: 92 FL (ref 82–98)
MONOCYTES # BLD AUTO: 0.37 THOUSAND/ΜL (ref 0.17–1.22)
MONOCYTES NFR BLD AUTO: 6 % (ref 4–12)
NEUTROPHILS # BLD AUTO: 3.63 THOUSANDS/ΜL (ref 1.85–7.62)
NEUTS SEG NFR BLD AUTO: 58 % (ref 43–75)
NRBC BLD AUTO-RTO: 0 /100 WBCS
PLATELET # BLD AUTO: 305 THOUSANDS/UL (ref 149–390)
PMV BLD AUTO: 9.8 FL (ref 8.9–12.7)
POTASSIUM SERPL-SCNC: 3.9 MMOL/L (ref 3.5–5.3)
PROT SERPL-MCNC: 7.1 G/DL (ref 6.4–8.2)
RBC # BLD AUTO: 4.27 MILLION/UL (ref 3.81–5.12)
SODIUM SERPL-SCNC: 136 MMOL/L (ref 136–145)
TSH SERPL DL<=0.05 MIU/L-ACNC: 0.87 UIU/ML (ref 0.36–3.74)
WBC # BLD AUTO: 6.25 THOUSAND/UL (ref 4.31–10.16)

## 2021-06-26 PROCEDURE — 84443 ASSAY THYROID STIM HORMONE: CPT

## 2021-06-26 PROCEDURE — 85025 COMPLETE CBC W/AUTO DIFF WBC: CPT

## 2021-06-26 PROCEDURE — 82784 ASSAY IGA/IGD/IGG/IGM EACH: CPT

## 2021-06-26 PROCEDURE — 80053 COMPREHEN METABOLIC PANEL: CPT

## 2021-06-26 PROCEDURE — 85652 RBC SED RATE AUTOMATED: CPT

## 2021-06-26 PROCEDURE — 83516 IMMUNOASSAY NONANTIBODY: CPT

## 2021-06-26 PROCEDURE — 36415 COLL VENOUS BLD VENIPUNCTURE: CPT

## 2021-06-26 PROCEDURE — 86255 FLUORESCENT ANTIBODY SCREEN: CPT

## 2021-06-28 ENCOUNTER — TELEPHONE (OUTPATIENT)
Dept: GASTROENTEROLOGY | Facility: CLINIC | Age: 46
End: 2021-06-28

## 2021-06-28 NOTE — TELEPHONE ENCOUNTER
----- Message from Shaan Monteiro PA-C sent at 6/28/2021  8:09 AM EDT -----  Please let her know her labs were normal except her blood sugar which remains very elevated at 304 even if she was not fasting this is too high

## 2021-06-29 ENCOUNTER — OFFICE VISIT (OUTPATIENT)
Dept: FAMILY MEDICINE CLINIC | Facility: CLINIC | Age: 46
End: 2021-06-29
Payer: OTHER GOVERNMENT

## 2021-06-29 ENCOUNTER — TELEPHONE (OUTPATIENT)
Dept: ADMINISTRATIVE | Facility: OTHER | Age: 46
End: 2021-06-29

## 2021-06-29 VITALS
DIASTOLIC BLOOD PRESSURE: 64 MMHG | SYSTOLIC BLOOD PRESSURE: 100 MMHG | OXYGEN SATURATION: 99 % | WEIGHT: 159 LBS | HEIGHT: 64 IN | HEART RATE: 94 BPM | BODY MASS INDEX: 27.14 KG/M2 | TEMPERATURE: 96.3 F

## 2021-06-29 DIAGNOSIS — E11.42 TYPE 2 DIABETES MELLITUS WITH DIABETIC POLYNEUROPATHY, WITH LONG-TERM CURRENT USE OF INSULIN (HCC): Primary | ICD-10-CM

## 2021-06-29 DIAGNOSIS — Z79.4 TYPE 2 DIABETES MELLITUS WITH DIABETIC POLYNEUROPATHY, WITH LONG-TERM CURRENT USE OF INSULIN (HCC): Primary | ICD-10-CM

## 2021-06-29 LAB
ENDOMYSIUM IGA SER QL: NEGATIVE
GLIADIN PEPTIDE IGA SER-ACNC: 3 UNITS (ref 0–19)
GLIADIN PEPTIDE IGG SER-ACNC: 3 UNITS (ref 0–19)
IGA SERPL-MCNC: 250 MG/DL (ref 87–352)
TTG IGA SER-ACNC: <2 U/ML (ref 0–3)
TTG IGG SER-ACNC: <2 U/ML (ref 0–5)

## 2021-06-29 PROCEDURE — 99213 OFFICE O/P EST LOW 20 MIN: CPT | Performed by: FAMILY MEDICINE

## 2021-06-29 RX ORDER — INSULIN GLARGINE 100 [IU]/ML
12 INJECTION, SOLUTION SUBCUTANEOUS
Qty: 15 ML | Refills: 2 | Status: SHIPPED | OUTPATIENT
Start: 2021-06-29 | End: 2021-08-02

## 2021-06-29 RX ORDER — GABAPENTIN 100 MG/1
100 CAPSULE ORAL 3 TIMES DAILY
Qty: 90 CAPSULE | Refills: 1 | Status: SHIPPED | OUTPATIENT
Start: 2021-06-29 | End: 2021-08-30

## 2021-06-29 NOTE — TELEPHONE ENCOUNTER
----- Message from Anders Eldridge sent at 6/29/2021  1:19 PM EDT -----  Regarding: DM eye exam  06/29/21 1:19 PM    Hello, our patient Deandra Garcia has had Diabetic Eye Exam completed/performed  Please assist in updating the patient chart by making an External outreach to [facility] facility located in [address] [place] [address] The date of service is 1-2 weeks ago      Thank you,  Anders Sauceda [phone]

## 2021-06-29 NOTE — LETTER
Diabetic Eye Exam Form    Date Requested: 21  Patient: Lawson Reasons  Patient : 1975   Referring Provider: Hari Brewer DO    2nd Request    Dilated Retinal Exam, Optomap-Iris Exam, or Fundus Photography Done         Yes ([place] one above)         No     Date of Diabetic Eye Exam ______________________________  Left Eye      Exam did show retinopathy    Exam did not show retinopathy         Mild       Moderate       None       Proliferative       Severe     Right Eye     Exam did show retinopathy    Exam did not show retinopathy         Mild       Moderate       None       Proliferative       Severe     Comments __________________________________________________________    Practice Providing Exam ______________________________________________    Exam Performed By (print name) _______________________________________      Provider Signature ___________________________________________________      These reports are needed for  compliance    Please fax this completed form and a copy of the Diabetic Eye Exam report to our office located at [place] [address] as soon as possible to [phone] juan Guzman: Phone [phone]    We thank you for your assistance in treating our mutual patient

## 2021-06-29 NOTE — TELEPHONE ENCOUNTER
Upon review of the In Basket request and the patient's chart, initial outreach has been made via fax, please see Contacts section for details       Thank you  Leni Guzman, [address] [facility] [phone] Fax [phone]

## 2021-06-29 NOTE — LETTER
Diabetic Eye Exam Form    Date Requested: 21  Patient: Debby Colvin  Patient : 1975   Referring Provider: Trip Sol DO    Dilated Retinal Exam, Optomap-Iris Exam, or Fundus Photography Done         Yes ([place] one above)         No     Date of Diabetic Eye Exam ______________________________  Left Eye      Exam did show retinopathy    Exam did not show retinopathy         Mild       Moderate       None       Proliferative       Severe     Right Eye     Exam did show retinopathy    Exam did not show retinopathy         Mild       Moderate       None       Proliferative       Severe     Comments __________________________________________________________    Practice Providing Exam ______________________________________________    Exam Performed By (print name) _______________________________________      Provider Signature ___________________________________________________      These reports are needed for  compliance    Please fax this completed form and a copy of the Diabetic Eye Exam report to our office located at [place] [address] as soon as possible to [phone] juan Gifford: Phone [phone]    We thank you for your assistance in treating our mutual patient

## 2021-06-29 NOTE — PROGRESS NOTES
Assessment/Plan:    No problem-specific Assessment & Plan notes found for this encounter  Diagnoses and all orders for this visit:    Type 2 diabetes mellitus with diabetic polyneuropathy, with long-term current use of insulin (HCC)  -     gabapentin (NEURONTIN) 100 mg capsule; Take 1 capsule (100 mg total) by mouth 3 (three) times a day  -     insulin glargine (Lantus SoloStar) 100 units/mL injection pen; Inject 12 Units under the skin daily at bedtime        Increase Lantus to 12 units nightly  Will start Gabapentin 100 mg TID with increase to 300 mg nightly  Will follow up in 4 weeks  F/u in 4 weeks, DM  Subjective:      Patient ID: Nafisa Castillo is a 40 y o  female  HPI     Patient presents to the office for DM follow up  States that she was in the DR for 3 weeks  Notes that the blood sugars have been between 100s-170s fasting first thing in the morning  No issues with low blood sugars  Doing 10 units of Lantus at night, metformin 1000 mg 2 times per day and Actos 30 mg daily  Notes numbness and tingling in the feet, has been going on for about 1 year  Was previously on gabapentin but states that she stopped it because it was expensive  The following portions of the patient's history were reviewed and updated as appropriate: allergies, current medications, past family history, past medical history, past social history, past surgical history and problem list     Review of Systems   Constitutional: Negative for chills, fatigue and fever  HENT: Negative for congestion, ear pain, rhinorrhea and sore throat  Respiratory: Negative for cough and shortness of breath  Cardiovascular: Negative for chest pain and leg swelling  Gastrointestinal: Negative for abdominal distention, abdominal pain, constipation, nausea and vomiting  Genitourinary: Negative for dysuria, frequency and urgency  Skin: Negative for rash  Neurological: Positive for numbness (feet)   Negative for dizziness, light-headedness and headaches  Objective:  /64 (BP Location: Left arm, Patient Position: Sitting, Cuff Size: Adult)   Pulse 94   Temp (!) 96 3 °F (35 7 °C)   Ht 5' 4" (1 626 m)   Wt 72 1 kg (159 lb)   SpO2 99%   BMI 27 29 kg/m²      Physical Exam  Vitals reviewed  Constitutional:       General: She is not in acute distress  Appearance: Normal appearance  HENT:      Head: Normocephalic and atraumatic  Right Ear: External ear normal       Left Ear: External ear normal       Nose: Nose normal       Mouth/Throat:      Mouth: Mucous membranes are moist    Eyes:      Extraocular Movements: Extraocular movements intact  Conjunctiva/sclera: Conjunctivae normal    Cardiovascular:      Rate and Rhythm: Normal rate  Pulses: no weak pulses          Dorsalis pedis pulses are 2+ on the right side and 2+ on the left side  Posterior tibial pulses are 2+ on the right side and 2+ on the left side  Pulmonary:      Effort: Pulmonary effort is normal       Breath sounds: Normal breath sounds  No wheezing, rhonchi or rales  Abdominal:      General: Bowel sounds are normal  There is no distension  Palpations: Abdomen is soft  Tenderness: There is no abdominal tenderness  Musculoskeletal:        Feet:    Feet:      Right foot:      Skin integrity: No ulcer, skin breakdown, erythema, warmth, callus or dry skin  Left foot:      Skin integrity: No ulcer, skin breakdown, erythema, warmth, callus or dry skin  Skin:     General: Skin is warm  Capillary Refill: Capillary refill takes less than 2 seconds  Neurological:      Mental Status: She is alert  Mental status is at baseline  Patient's shoes and socks removed  Right Foot/Ankle   Right Foot Inspection  Skin Exam: skin normal and skin intact no dry skin, no warmth, no callus, no erythema, no maceration, no abnormal color, no pre-ulcer, no ulcer and no callus                          Toe Exam: ROM and strength within normal limits  Sensory       Monofilament testing: diminished  Vascular  Capillary refills: < 3 seconds  The right DP pulse is 2+  The right PT pulse is 2+  Left Foot/Ankle  Left Foot Inspection  Skin Exam: skin normal and skin intactno dry skin, no warmth, no erythema, no maceration, normal color, no pre-ulcer, no ulcer and no callus                         Toe Exam: ROM and strength within normal limits                   Sensory       Monofilament: diminished  Vascular  Capillary refills: < 3 seconds  The left DP pulse is 2+  The left PT pulse is 2+  Assign Risk Category:  No deformity present; Loss of protective sensation;  No weak pulses       Risk: [address], [facility]  6/29/2021 1:59 PM

## 2021-07-03 ENCOUNTER — APPOINTMENT (OUTPATIENT)
Dept: LAB | Facility: HOSPITAL | Age: 46
End: 2021-07-03
Payer: OTHER GOVERNMENT

## 2021-07-03 PROCEDURE — 87177 OVA AND PARASITES SMEARS: CPT

## 2021-07-03 PROCEDURE — 82656 EL-1 FECAL QUAL/SEMIQ: CPT

## 2021-07-03 PROCEDURE — 87209 SMEAR COMPLEX STAIN: CPT

## 2021-07-03 PROCEDURE — 87205 SMEAR GRAM STAIN: CPT

## 2021-07-03 PROCEDURE — 87505 NFCT AGENT DETECTION GI: CPT

## 2021-07-04 LAB
CAMPYLOBACTER DNA SPEC NAA+PROBE: DETECTED
SALMONELLA DNA SPEC QL NAA+PROBE: ABNORMAL
SHIGA TOXIN STX GENE SPEC NAA+PROBE: ABNORMAL
SHIGELLA DNA SPEC QL NAA+PROBE: ABNORMAL
WBC STL QL MICRO: NORMAL

## 2021-07-06 ENCOUNTER — TELEPHONE (OUTPATIENT)
Dept: GASTROENTEROLOGY | Facility: CLINIC | Age: 46
End: 2021-07-06

## 2021-07-06 NOTE — TELEPHONE ENCOUNTER
As a follow-up, a second attempt has been made for outreach via fax, please see Contacts section for details      Thank you  Ivana Sandhu, [address] [facility] [phone] Fax [phone]

## 2021-07-07 ENCOUNTER — TELEPHONE (OUTPATIENT)
Dept: GASTROENTEROLOGY | Facility: CLINIC | Age: 46
End: 2021-07-07

## 2021-07-07 NOTE — TELEPHONE ENCOUNTER
----- Message from Gretel Bee PA-C sent at 7/7/2021  9:33 AM EDT -----  Please inform patient that her stool culture was positive for Campylobacter  Please see if patient is still symptomatic with diarrhea  Thank you!

## 2021-07-08 LAB
ELASTASE PANC STL-MCNT: >500 UG ELAST./G
O+P STL CONC: NORMAL

## 2021-07-12 NOTE — TELEPHONE ENCOUNTER
As a final attempt, a third outreach has been made via telephone call  Please see Contacts section for details  This encounter will be closed and completed by end of day  Should we receive the requested information because of previous outreach attempts, the requested patient's chart will be updated appropriately       Thank you  Santiago Oconnell

## 2021-07-12 NOTE — PROGRESS NOTES
Assessment and Plan:   Patient is a 50-year-old female with fibromyalgia who presents for rheumatology consult for evaluation of her chronic joint and muscle pain  She reports a diagnosis of fibromyalgia over the past 15 years but has had lifelong issues with chronic joint and muscle pain  She describes issues with widespread pain that are not really improved by anything she has found thus far  She just recently started gabapentin and we discussed it is too soon to tell if this will help as it can take several weeks  Her exam and history are not typical for an underlying inflammatory arthropathy, and exam is most consistent with fibromyalgia  We discussed this and that fibromyalgia is a chronic pain disorder and not an autoimmune disease  We will do autoimmune workup to be thorough but suspicion is low for an underlying inflammatory or autoimmune disease  We discussed she needs to give the gabapentin more time to take effect  Other treatment options include Cymbalta, Savella, and Lyrica  We discussed that I do not manage fibromyalgia in the long-term and she can continue follow-up with her family doctor as long as we rule out autoimmune etiologies  She was understanding and agreeable with that plan      Plan:  Diagnoses and all orders for this visit:    Polyarthralgia  -     Chronic Hepatitis Panel  -     Vitamin D 25 hydroxy  -     JIGAR Screen w/ Reflex to Titer/Pattern  -     C-reactive protein  -     Cyclic citrul peptide antibody, IgG  -     Sjogren's Antibodies  -     Sedimentation rate, automated  -     RF Screen w/ Reflex to Titer    Myalgia  -     Chronic Hepatitis Panel  -     Vitamin D 25 hydroxy  -     JIGAR Screen w/ Reflex to Titer/Pattern  -     C-reactive protein  -     Cyclic citrul peptide antibody, IgG  -     Sjogren's Antibodies  -     Sedimentation rate, automated  -     RF Screen w/ Reflex to Titer        Follow-up plan: no rheumatology follow-up needed if work-up negative RASHMI Carter is a 40 y o   female with type 2 DM, IBS, GERD, hyperlipidemia, asthma, who presents for rheumatology consult by request of Dr Chong Landaverde for fibromyalgia  Reports she never saw Rheumatology in the past   Reports she started having joint pain at 10yo, was taken to doctor and no diagnosis was made  Has had chronic joint pain for all of her life  Once she moved here from [place] about 14 years ago, she was diagnosed with fibromyalgia  Joint pain in her knees, fingers, ankles, "all of my joints "  Also in her muscles, in her thighs and legs diffusely  Movement or rest do not help her  Does not feel anything really helps, the pain is always present  Morning pain is widespread, does not improvement with movement and activity  Swelling in her feet as the day progresses  Chronic dry mouth  Denies photosensitivity, rashes, psoriasis, sicca symptoms, oral or nasal ulcers, alopecia, Raynaud's, h/o pericarditis or pleurisy, h/o blood clots or miscarriages  Reports her only form of exercise is walking to her mailbox  She just started on gabapentin within the last 2 weeks and is on a total of 500 mg every day  No improvement thus far  Review of Systems  Review of Systems   Constitutional: Positive for fatigue  Negative for chills, fever and unexpected weight change  HENT: Negative for mouth sores and trouble swallowing  Eyes: Negative for pain and visual disturbance  Respiratory: Negative for cough and shortness of breath  Cardiovascular: Negative for chest pain and leg swelling  Gastrointestinal: Negative for abdominal pain, blood in stool, constipation, diarrhea and nausea  Musculoskeletal: Positive for arthralgias and myalgias  Negative for back pain and joint swelling  Skin: Negative for color change and rash  Neurological: Negative for weakness and numbness  Hematological: Negative for adenopathy  Psychiatric/Behavioral: Positive for sleep disturbance  Allergies  No Known Allergies    Home Medications    Current Outpatient Medications:     albuterol (PROVENTIL HFA,VENTOLIN HFA) 90 mcg/act inhaler, Inhale 1 puff every 4 (four) hours as needed for wheezing, Disp: 1 Inhaler, Rfl: 2    amitriptyline (ELAVIL) 50 mg tablet, Take 1 tablet (50 mg total) by mouth daily at bedtime, Disp: 30 tablet, Rfl: 2    aspirin 81 MG tablet, Take by mouth daily , Disp: , Rfl:     atorvastatin (LIPITOR) 40 mg tablet, Take 1 tablet (40 mg total) by mouth daily at bedtime, Disp: 30 tablet, Rfl: 2    Blood Glucose Monitoring Suppl (ONE TOUCH ULTRA 2) w/Device KIT, as directed, Disp: , Rfl:     cholestyramine (QUESTRAN) 4 g packet, Take 1 packet (4 g total) by mouth 3 (three) times a day with meals, Disp: 90 packet, Rfl: 0    dicyclomine (BENTYL) 20 mg tablet, Take 1 tablet (20 mg total) by mouth every 6 (six) hours as needed (abdominal pain), Disp: 45 tablet, Rfl: 1    esomeprazole (NexIUM) 40 MG capsule, Take 1 capsule (40 mg total) by mouth 2 (two) times a day before meals, Disp: 60 capsule, Rfl: 3    famotidine (PEPCID) 20 mg tablet, Take 1 tablet (20 mg total) by mouth 2 (two) times a day, Disp: 60 tablet, Rfl: 2    fluticasone-salmeterol (Advair Diskus) 500-50 mcg/dose inhaler, Inhale 1 puff daily, Disp: 1 each, Rfl: 2    gabapentin (NEURONTIN) 100 mg capsule, Take 1 capsule (100 mg total) by mouth 3 (three) times a day, Disp: 90 capsule, Rfl: 1    Glucagon, rDNA, (Glucagon Emergency) 1 MG KIT, Inject 1 kit as directed as needed (hypoglycemia), Disp: 1 kit, Rfl: 2    glucose blood test strip, TEST BLOOD SUGARS 3 TIMES DAILY, Disp: , Rfl:     insulin glargine (Lantus SoloStar) 100 units/mL injection pen, Inject 12 Units under the skin daily at bedtime, Disp: 15 mL, Rfl: 2    Insulin Pen Needle 31G X 5 MM MISC, Use 3 (three) times a day Use 1 lancet 3 times daily to inject insulin with meals  , Disp: 100 each, Rfl: 3    Lancets (ONETOUCH ULTRASOFT) lancets, uncontrolled DM, hyperglycemia, hypertension, test 4 times daily, 250 02 150/month, Disp: , Rfl:     metFORMIN (GLUCOPHAGE) 1000 MG tablet, Take 1 tablet (1,000 mg total) by mouth 2 (two) times a day with meals, Disp: 60 tablet, Rfl: 2    montelukast (SINGULAIR) 10 mg tablet, Take 1 tablet (10 mg total) by mouth daily at bedtime, Disp: 30 tablet, Rfl: 2    omega-3-acid ethyl esters (LOVAZA) 1 g capsule, Take 2 capsules (2 g total) by mouth 2 (two) times a day, Disp: 180 capsule, Rfl: 2    omeprazole (PriLOSEC) 40 MG capsule, Take 1 capsule (40 mg total) by mouth daily, Disp: 30 capsule, Rfl: 2    pioglitazone (ACTOS) 30 mg tablet, Take 1 tablet (30 mg total) by mouth daily, Disp: 30 tablet, Rfl: 1    Past Medical History  Past Medical History:   Diagnosis Date    Asthma     Colon polyp     Diabetes mellitus ([facility] [address] )     Fibromyalgia     Hyperlipidemia        Past Surgical History   Past Surgical History:   Procedure Laterality Date     SECTION      CHOLECYSTECTOMY      COLONOSCOPY N/A 10/24/2018    Procedure: COLONOSCOPY;  Surgeon: Jayde Mcpherson MD;  Location: [facility]; Service: Gastroenterology    ESOPHAGOGASTRODUODENOSCOPY N/A 10/26/2018    Procedure: ESOPHAGOGASTRODUODENOSCOPY (EGD); Surgeon: Jayde Mcpherson MD;  Location: [facility]; Service: Gastroenterology    TUBAL LIGATION         Family History  No known family history of autoimmune or inflammatory diseases      Family History   Problem Relation Age of Onset    Diabetes Mother     No Known Problems Sister     No Known Problems Sister     No Known Problems Maternal Aunt     No Known Problems Maternal Aunt     No Known Problems Maternal Aunt     No Known Problems Maternal Aunt     No Known Problems Maternal Aunt     Cancer Maternal Aunt     Breast cancer Cousin 44    Prostate cancer Maternal Uncle     Prostate cancer Maternal Uncle     Cancer Maternal Uncle        Social History  Occupation: not working   Social History Substance and Sexual Activity   Alcohol Use No     Social History     Substance and Sexual Activity   Drug Use Never     Social History     Tobacco Use   Smoking Status Never Smoker   Smokeless Tobacco Never Used       Objective:    Vitals:    07/13/21 0840   BP: 102/64   Pulse: 88   Weight: 72 1 kg (159 lb)   Height: 5' 4" (1 626 m)       Physical Exam  Constitutional:       General: She is not in acute distress  Appearance: She is well-developed  HENT:      Head: Normocephalic and atraumatic  Eyes:      General: Lids are normal  No scleral icterus  Conjunctiva/sclera: Conjunctivae normal    Pulmonary:      Effort: Pulmonary effort is normal  No tachypnea, accessory muscle usage or respiratory distress  Musculoskeletal:      Cervical back: Neck supple  Comments: Diffuse reproducible soft tissue tenderness with majority of fibromyalgia tender points present  No joint swelling or synovitis anywhere  Skin:     General: Skin is dry  Findings: No rash  Neurological:      Mental Status: She is alert  Motor: Motor function is intact  Comments: Motor exam limited by pain    Psychiatric:         Behavior: Behavior normal  Behavior is cooperative           Imaging:   No recent musculoskeletal imaging for review       Labs:   Component      Latest Ref Rng & Units 6/26/2021   WBC      4 31 - 10 16 Thousand/uL 6 25   Red Blood Cell Count      3 81 - 5 12 Million/uL 4 27   Hemoglobin      11 5 - 15 4 g/dL 13 9   HCT      34 8 - 46 1 % 39 2   MCV      82 - 98 fL 92   MCH      26 8 - 34 3 pg 32 6   MCHC      31 4 - 37 4 g/dL 35 5   RDW      11 6 - 15 1 % 11 7   MPV      8 9 - 12 7 fL 9 8   Platelet Count      756 - 390 Thousands/uL 305   nRBC      /100 WBCs 0   Neutrophils %      43 - 75 % 58   Immat GRANS %      0 - 2 % 1   Lymphocytes Relative      14 - 44 % 32   Monocytes Relative      4 - 12 % 6   Eosinophils      0 - 6 % 2   Basophils Relative      0 - 1 % 1   Absolute Neutrophils 1 85 - 7 62 Thousands/µL 3 63   Immature Grans Absolute      0 00 - 0 20 Thousand/uL 0 05   Lymphocytes Absolute      0 60 - 4 47 Thousands/µL 2 00   Absolute Monocytes      0 17 - 1 22 Thousand/µL 0 37   Absolute Eosinophils      0 00 - 0 61 Thousand/µL 0 13   Basophils Absolute      0 00 - 0 10 Thousands/µL 0 07   Sodium      136 - 145 mmol/L 136   Potassium      3 5 - 5 3 mmol/L 3 9   Chloride      100 - 108 mmol/L 102   CO2      21 - 32 mmol/L 27   Anion Gap      4 - 13 mmol/L 7   BUN      5 - 25 mg/dL 14   Creatinine      0 60 - 1 30 mg/dL 0 79   GLUCOSE FASTING      65 - 99 mg/dL 304 (H)   Calcium      8 3 - 10 1 mg/dL 8 7   CORRECTED CALCIUM      8 3 - 10 1 mg/dL 9 3   AST      5 - 45 U/L 14   ALT      12 - 78 U/L 25   Alkaline Phosphatase      46 - 116 U/L 62   Total Protein      6 4 - 8 2 g/dL 7 1   Albumin      3 5 - 5 0 g/dL 3 2 (L)   TOTAL BILIRUBIN      0 20 - 1 00 mg/dL 0 64   eGFR      ml/min/1 73sq m 91   IGA      87 - 352 mg/dL 250   GLIADIN IGA ANTIBODIES      0 - 19 units 3   GLIADIN IGG ANTIBODIES      0 - 19 units 3   Tissue Transglut Ab IGG      0 - 5 U/mL <2   TTG IGA      0 - 3 U/mL <2   ENDOMYSIAL IGA ANTIBODY      Negative Negative   Sed Rate      0 - 19 mm/hour 3   TSH 3RD GENERATON      0 358 - 3 740 uIU/mL 0 874

## 2021-07-13 ENCOUNTER — APPOINTMENT (OUTPATIENT)
Dept: LAB | Facility: HOSPITAL | Age: 46
End: 2021-07-13
Payer: OTHER GOVERNMENT

## 2021-07-13 ENCOUNTER — OFFICE VISIT (OUTPATIENT)
Dept: RHEUMATOLOGY | Facility: CLINIC | Age: 46
End: 2021-07-13
Payer: OTHER GOVERNMENT

## 2021-07-13 VITALS
DIASTOLIC BLOOD PRESSURE: 64 MMHG | WEIGHT: 159 LBS | SYSTOLIC BLOOD PRESSURE: 102 MMHG | HEART RATE: 88 BPM | HEIGHT: 64 IN | BODY MASS INDEX: 27.14 KG/M2

## 2021-07-13 DIAGNOSIS — M79.10 MYALGIA: ICD-10-CM

## 2021-07-13 DIAGNOSIS — M25.50 POLYARTHRALGIA: Primary | ICD-10-CM

## 2021-07-13 LAB
25(OH)D3 SERPL-MCNC: 11.3 NG/ML (ref 30–100)
CRP SERPL QL: <3 MG/L
ERYTHROCYTE [SEDIMENTATION RATE] IN BLOOD: 10 MM/HOUR (ref 0–19)
HBV CORE AB SER QL: NORMAL
HBV CORE IGM SER QL: NORMAL
HBV SURFACE AG SER QL: NORMAL
HCV AB SER QL: NORMAL

## 2021-07-13 PROCEDURE — 86705 HEP B CORE ANTIBODY IGM: CPT | Performed by: INTERNAL MEDICINE

## 2021-07-13 PROCEDURE — 86704 HEP B CORE ANTIBODY TOTAL: CPT | Performed by: INTERNAL MEDICINE

## 2021-07-13 PROCEDURE — 36415 COLL VENOUS BLD VENIPUNCTURE: CPT | Performed by: INTERNAL MEDICINE

## 2021-07-13 PROCEDURE — 82306 VITAMIN D 25 HYDROXY: CPT | Performed by: INTERNAL MEDICINE

## 2021-07-13 PROCEDURE — 86140 C-REACTIVE PROTEIN: CPT | Performed by: INTERNAL MEDICINE

## 2021-07-13 PROCEDURE — 99244 OFF/OP CNSLTJ NEW/EST MOD 40: CPT | Performed by: INTERNAL MEDICINE

## 2021-07-13 PROCEDURE — 85652 RBC SED RATE AUTOMATED: CPT | Performed by: INTERNAL MEDICINE

## 2021-07-13 PROCEDURE — 86038 ANTINUCLEAR ANTIBODIES: CPT | Performed by: INTERNAL MEDICINE

## 2021-07-13 PROCEDURE — 87340 HEPATITIS B SURFACE AG IA: CPT | Performed by: INTERNAL MEDICINE

## 2021-07-13 PROCEDURE — 86430 RHEUMATOID FACTOR TEST QUAL: CPT | Performed by: INTERNAL MEDICINE

## 2021-07-13 PROCEDURE — 86200 CCP ANTIBODY: CPT | Performed by: INTERNAL MEDICINE

## 2021-07-13 PROCEDURE — 86803 HEPATITIS C AB TEST: CPT | Performed by: INTERNAL MEDICINE

## 2021-07-13 PROCEDURE — 86235 NUCLEAR ANTIGEN ANTIBODY: CPT | Performed by: INTERNAL MEDICINE

## 2021-07-14 LAB
ENA SS-A AB SER-ACNC: <0.2 AI (ref 0–0.9)
ENA SS-B AB SER-ACNC: <0.2 AI (ref 0–0.9)
RHEUMATOID FACT SER QL LA: NEGATIVE
RYE IGE QN: NEGATIVE

## 2021-07-16 DIAGNOSIS — E55.9 VITAMIN D DEFICIENCY: Primary | ICD-10-CM

## 2021-07-16 LAB — CCP AB SER IA-ACNC: 0.8

## 2021-07-16 RX ORDER — ERGOCALCIFEROL 1.25 MG/1
50000 CAPSULE ORAL WEEKLY
Qty: 12 CAPSULE | Refills: 0 | Status: SHIPPED | OUTPATIENT
Start: 2021-07-16 | End: 2022-02-02

## 2021-08-02 ENCOUNTER — OFFICE VISIT (OUTPATIENT)
Dept: FAMILY MEDICINE CLINIC | Facility: CLINIC | Age: 46
End: 2021-08-02
Payer: OTHER GOVERNMENT

## 2021-08-02 VITALS
BODY MASS INDEX: 28.34 KG/M2 | DIASTOLIC BLOOD PRESSURE: 74 MMHG | OXYGEN SATURATION: 98 % | HEIGHT: 64 IN | TEMPERATURE: 98.4 F | WEIGHT: 166 LBS | HEART RATE: 94 BPM | SYSTOLIC BLOOD PRESSURE: 124 MMHG

## 2021-08-02 DIAGNOSIS — M79.7 FIBROMYALGIA: ICD-10-CM

## 2021-08-02 DIAGNOSIS — Z79.4 TYPE 2 DIABETES MELLITUS WITH DIABETIC POLYNEUROPATHY, WITH LONG-TERM CURRENT USE OF INSULIN (HCC): Primary | ICD-10-CM

## 2021-08-02 DIAGNOSIS — E11.42 TYPE 2 DIABETES MELLITUS WITH DIABETIC POLYNEUROPATHY, WITH LONG-TERM CURRENT USE OF INSULIN (HCC): Primary | ICD-10-CM

## 2021-08-02 PROBLEM — K52.9 GASTROENTERITIS, ACUTE: Status: RESOLVED | Noted: 2018-10-20 | Resolved: 2021-08-02

## 2021-08-02 PROCEDURE — 99214 OFFICE O/P EST MOD 30 MIN: CPT | Performed by: FAMILY MEDICINE

## 2021-08-02 RX ORDER — INSULIN GLARGINE 100 [IU]/ML
15 INJECTION, SOLUTION SUBCUTANEOUS
Qty: 15 ML | Refills: 2 | Status: SHIPPED | OUTPATIENT
Start: 2021-08-02 | End: 2021-10-18 | Stop reason: SDUPTHER

## 2021-08-02 RX ORDER — PIOGLITAZONEHYDROCHLORIDE 45 MG/1
45 TABLET ORAL DAILY
Qty: 90 TABLET | Refills: 1 | Status: SHIPPED | OUTPATIENT
Start: 2021-08-02 | End: 2022-02-02

## 2021-08-02 RX ORDER — AMITRIPTYLINE HYDROCHLORIDE 75 MG/1
75 TABLET, FILM COATED ORAL
Qty: 30 TABLET | Refills: 1 | Status: SHIPPED | OUTPATIENT
Start: 2021-08-02 | End: 2021-08-30

## 2021-08-02 NOTE — PATIENT INSTRUCTIONS
Increase your Lantus to 15 units nightly  Increase your Actos to 45 mg daily  Increase the Gabapentin by 100 mg per dose every two days with the goal of 300 mg BID and 600 mg nightly  Increase the Amitriptyline to 75 mg nightly  Follow up with PM&R      Kegel Exercises for Women   AMBULATORY CARE:   Kegel exercises  help strengthen your pelvic muscles  Pelvic muscles hold your pelvic organs, such as your bladder and uterus, in place  Kegel exercises help prevent or control problems with urine incontinence (leakage)  Incontinence may be caused by pregnancy, childbirth, or menopause  Contact your healthcare provider if:   · You cannot feel your muscles tighten or relax  · You continue to leak urine  · You have questions or concerns about your condition or care  Use the correct muscles:  Pelvic muscles are the muscles you use to control urine flow  To target these muscles, stop and start the flow of urine several times  This will help you become familiar with how it feels to tighten and relax these muscles  How to do Kegel exercises:   · Empty your bladder  You may lie down, stand up, or sit down to do these exercises  When you first try to do these exercises, it may be easier if you lie down  Tighten or squeeze your pelvic muscles slowly  It may feel like you are trying to hold back urine or gas  Hold this position for 3 seconds  Relax for 3 seconds  Repeat this cycle 10 times  · Do 10 sets of Kegel exercises, at least 3 times a day  Do not hold your breath when you do Kegel exercises  Keep your stomach, back, and leg muscles relaxed  · As your muscles get stronger, you will be able to hold the squeeze longer  Your healthcare provider may ask that you increase your pelvic muscle squeeze to 10 seconds  After you squeeze for 10 seconds, relax for 10 seconds  What else you should know:   · Once you know how to do Kegel exercises, use different positions   You can do these exercises while you lie on the floor, sit at your desk or watch TV, and while you stand  · You may notice improved bladder control within about 6 weeks  · Tighten your pelvic muscles before you sneeze, cough, or lift to prevent urine leakage  Follow up with your healthcare provider as directed:  Write down your questions so you remember to ask them during your visits  © Copyright [employer] 2021 Information is for End User's use only and may not be sold, redistributed or otherwise used for commercial purposes  All illustrations and images included in CareNotes® are the copyrighted property of [employer]  or [facility] [employer]   The above information is an  only  It is not intended as medical advice for individual conditions or treatments  Talk to your doctor, nurse or pharmacist before following any medical regimen to see if it is safe and effective for you

## 2021-08-02 NOTE — PROGRESS NOTES
Assessment/Plan:    No problem-specific Assessment & Plan notes found for this encounter  Diagnoses and all orders for this visit:    Type 2 diabetes mellitus with diabetic polyneuropathy, with long-term current use of insulin (HCC)  Increase Lantus to 15 units nightly, Increase Actos to 45 mg daily  -     pioglitazone (ACTOS) 45 mg tablet; Take 1 tablet (45 mg total) by mouth daily  -     insulin glargine (Lantus SoloStar) 100 units/mL injection pen; Inject 15 Units under the skin daily at bedtime    Fibromyalgia  Increase Elavil to 75 mg nightly  Monitor for improvement on higher dose, consider switching to Cymbalta  Patient would like referral to fibromyalgia doctor, referral to PM&R sent  -     amitriptyline (ELAVIL) 75 mg tablet; Take 1 tablet (75 mg total) by mouth daily at bedtime  -     Ambulatory referral to Physical Medicine Rehab; Future        F/u in 4 weeks  Subjective:      Patient ID: Fatmata Naik is a 40 y o  female  HPI     Patient presents to the office for DM follow up  States that she has been checking her blood sugars 2 times per day, always elevated in the 200s  States that the AM glucose is usually in the low 200s, lowest it has been is 188  Last Hgb A1c of 10 2 on May 12  She currently is taking:  Metformin 1000 mg BID  Actos 30 mg daily  12 units of Lantus nightly  Notes that her legs have been uncomfortable especially during the night  She has been having burning and discomfort  States that this occurs during the day too but is worse at night  Notes some tingling at well ("pins and needles")  Notes that she is having leakage or urine with laughing, sneezing  Denies burning or discomfort with her urination  No blood in the urine        The following portions of the patient's history were reviewed and updated as appropriate: allergies, current medications, past family history, past medical history, past social history, past surgical history and problem list     Review of Systems   Constitutional: Negative for activity change, appetite change, fatigue and fever  HENT: Negative for congestion, ear pain, rhinorrhea and sore throat  Eyes: Negative for pain  Respiratory: Negative for cough and shortness of breath  Cardiovascular: Negative for chest pain and leg swelling  Gastrointestinal: Negative for abdominal pain, constipation, diarrhea, nausea and vomiting  Genitourinary: Negative for difficulty urinating, dysuria, frequency, hematuria and urgency  Skin: Negative for rash  Neurological: Negative for dizziness, light-headedness and headaches  Psychiatric/Behavioral: Positive for sleep disturbance  Objective:  /74 (BP Location: Left arm, Patient Position: Sitting, Cuff Size: Adult)   Pulse 94   Temp 98 4 °F (36 9 °C)   Ht 5' 4" (1 626 m)   Wt 75 3 kg (166 lb)   SpO2 98%   BMI 28 49 kg/m²      Physical Exam  Vitals reviewed  Constitutional:       General: She is not in acute distress  Appearance: Normal appearance  HENT:      Head: Normocephalic and atraumatic  Right Ear: External ear normal       Left Ear: External ear normal       Nose: Nose normal       Mouth/Throat:      Mouth: Mucous membranes are moist    Eyes:      Extraocular Movements: Extraocular movements intact  Conjunctiva/sclera: Conjunctivae normal    Cardiovascular:      Rate and Rhythm: Normal rate and regular rhythm  Pulses: Normal pulses  Heart sounds: Normal heart sounds  Pulmonary:      Effort: Pulmonary effort is normal       Breath sounds: Normal breath sounds  No wheezing, rhonchi or rales  Abdominal:      General: Bowel sounds are normal  There is no distension  Palpations: Abdomen is soft  Tenderness: There is no abdominal tenderness  Musculoskeletal:      Right lower leg: No edema  Left lower leg: No edema  Skin:     General: Skin is warm        Capillary Refill: Capillary refill takes less than 2 seconds  Findings: No rash  Neurological:      Mental Status: She is alert  Mental status is at baseline           Sydni Rahman DO  [facility]  8/2/2021 12:53 PM

## 2021-08-03 ENCOUNTER — TELEPHONE (OUTPATIENT)
Dept: ADMINISTRATIVE | Facility: OTHER | Age: 46
End: 2021-08-03

## 2021-08-03 NOTE — LETTER
Diabetic Eye Exam Form    Date Requested: 21  Patient: Chasity Carter  Patient : 1975   Referring Provider: Tracy Arriaga,     Dilated Retinal Exam, Optomap-Iris Exam, or Fundus Photography Done         Yes ([place] one above)         No     Date of Diabetic Eye Exam ______________________________  Left Eye      Exam did show retinopathy    Exam did not show retinopathy         Mild       Moderate       None       Proliferative       Severe     Right Eye     Exam did show retinopathy    Exam did not show retinopathy         Mild       Moderate       None       Proliferative       Severe     Comments __________________________________________________________    Practice Providing Exam ______________________________________________    Exam Performed By (print name) _______________________________________      Provider Signature ___________________________________________________      These reports are needed for  compliance    Please fax this completed form and a copy of the Diabetic Eye Exam report to our office located at [place] [address] as soon as possible to [phone] attention Jayla: Phone [phone]    We thank you for your assistance in treating our mutual patient     (Sent to [address])

## 2021-08-03 NOTE — LETTER
Diabetic Eye Exam Form    Date Requested: 08/10/21  Patient: Lawson Reasons  Patient : 1975   Referring Provider: Hari Brewer,     Dilated Retinal Exam, Optomap-Iris Exam, or Fundus Photography Done         Yes ([place] one above)         No     Date of Diabetic Eye Exam ______________________________  Left Eye      Exam did show retinopathy    Exam did not show retinopathy         Mild       Moderate       None       Proliferative       Severe     Right Eye     Exam did show retinopathy    Exam did not show retinopathy         Mild       Moderate       None       Proliferative       Severe     Comments __________________________________________________________    Practice Providing Exam ______________________________________________    Exam Performed By (print name) _______________________________________      Provider Signature ___________________________________________________      These reports are needed for  compliance    Please fax this completed form and a copy of the Diabetic Eye Exam report to our office located at [place] [address] as soon as possible to [phone] attention Jayla: Phone [phone]    We thank you for your assistance in treating our mutual patient     (sent to [facility])

## 2021-08-03 NOTE — LETTER
Diabetic Eye Exam Form    Date Requested: 21  Patient: Mounika Doyle  Patient : 1975   Referring Provider: Toni Drew,     Dilated Retinal Exam, Optomap-Iris Exam, or Fundus Photography Done         Yes ([place] one above)         No     Date of Diabetic Eye Exam ______________________________  Left Eye      Exam did show retinopathy    Exam did not show retinopathy         Mild       Moderate       None       Proliferative       Severe     Right Eye     Exam did show retinopathy    Exam did not show retinopathy         Mild       Moderate       None       Proliferative       Severe     Comments __________________________________________________________    Practice Providing Exam ______________________________________________    Exam Performed By (print name) _______________________________________      Provider Signature ___________________________________________________      These reports are needed for  compliance    Please fax this completed form and a copy of the Diabetic Eye Exam report to our office located at [place] [address] as soon as possible to [phone] attention Jayla: Phone [phone]    We thank you for your assistance in treating our mutual patient     (sent to [facility])

## 2021-08-03 NOTE — TELEPHONE ENCOUNTER
----- Message from Patricia Moran sent at 8/2/2021  9:24 AM EDT -----  Regarding: DM eye exam  08/02/21 9:24 AM    Hello, our patient Tyra Rapp has had Diabetic Eye Exam completed/performed   Please assist in updating the patient chart by making an External outreach to [facility] facility located in [place] The date of service is 1-2 months ago    Thank you,  Patricia Moran   [facility] [address]

## 2021-08-04 ENCOUNTER — OFFICE VISIT (OUTPATIENT)
Dept: GASTROENTEROLOGY | Facility: CLINIC | Age: 46
End: 2021-08-04
Payer: OTHER GOVERNMENT

## 2021-08-04 VITALS
WEIGHT: 165 LBS | DIASTOLIC BLOOD PRESSURE: 72 MMHG | HEIGHT: 64 IN | SYSTOLIC BLOOD PRESSURE: 124 MMHG | HEART RATE: 88 BPM | BODY MASS INDEX: 28.17 KG/M2

## 2021-08-04 DIAGNOSIS — A04.5 CAMPYLOBACTER DIARRHEA: ICD-10-CM

## 2021-08-04 DIAGNOSIS — A09 DIARRHEA OF INFECTIOUS ORIGIN: Primary | ICD-10-CM

## 2021-08-04 PROCEDURE — 99213 OFFICE O/P EST LOW 20 MIN: CPT | Performed by: PHYSICIAN ASSISTANT

## 2021-08-04 RX ORDER — AZITHROMYCIN 500 MG/1
500 TABLET, FILM COATED ORAL DAILY
Qty: 5 TABLET | Refills: 0 | Status: SHIPPED | OUTPATIENT
Start: 2021-08-04 | End: 2021-08-09

## 2021-08-04 NOTE — PROGRESS NOTES
[facility] - Outpatient Follow-up Note  Trent Krishnamurthy 40 y o  female MRN: [id number]  Encounter: [id number]          ASSESSMENT AND PLAN:      1  Diarrhea of infectious origin  2  Campylobacter diarrhea  Stool cultures were positive for Campylobacter  She continues to report severe diarrhea  Imodium was ineffective  Will treat with Azithromycin 500mg daily x 5 days  Will f/u next week with phone call to assess for improvement    ______________________________________________________________________    SUBJECTIVE:    81-year-old female presents for follow-up of diarrhea  After her last appointment she submitted stool cultures for enteric pathogens  This tested positive for Campylobacter  She continues to report severe watery diarrhea  She has not yet been treated for Campylobacter infection  She admits to ongoing abdominal pain but denies bloody stools, nausea or vomiting  She is tolerating a diet although she is eating less due to her symptoms  She denies any fevers or chills  She admits that her daughter who was with her in the [facility] he was seen foods has severe diarrhea as well which has not been tested for this yet  REVIEW OF SYSTEMS IS OTHERWISE NEGATIVE  Historical Information   Past Medical History:   Diagnosis Date    Asthma     Colon polyp     Diabetes mellitus ([address] )     Fibromyalgia     Hyperlipidemia      Past Surgical History:   Procedure Laterality Date     SECTION      CHOLECYSTECTOMY      COLONOSCOPY N/A 10/24/2018    Procedure: COLONOSCOPY;  Surgeon: Maldonado Lewis MD;  Location: [facility]; Service: Gastroenterology    ESOPHAGOGASTRODUODENOSCOPY N/A 10/26/2018    Procedure: ESOPHAGOGASTRODUODENOSCOPY (EGD); Surgeon: Maldonado Lewis MD;  Location: [facility];   Service: Gastroenterology    TUBAL LIGATION       Social History   Social History     Substance and Sexual Activity   Alcohol Use No     Social History     Substance and Sexual Activity   Drug Use Never     Social History     Tobacco Use   Smoking Status Never Smoker   Smokeless Tobacco Never Used     Family History   Problem Relation Age of Onset    Diabetes Mother     No Known Problems Sister     No Known Problems Sister     No Known Problems Maternal Aunt     No Known Problems Maternal Aunt     No Known Problems Maternal Aunt     No Known Problems Maternal Aunt     No Known Problems Maternal Aunt     Cancer Maternal Aunt     Breast cancer Cousin 44    Prostate cancer Maternal Uncle     Prostate cancer Maternal Uncle     Cancer Maternal Uncle        Meds/Allergies       Current Outpatient Medications:     albuterol (PROVENTIL HFA,VENTOLIN HFA) 90 mcg/act inhaler    amitriptyline (ELAVIL) 75 mg tablet    aspirin 81 MG tablet    atorvastatin (LIPITOR) 40 mg tablet    Blood Glucose Monitoring Suppl (ONE TOUCH ULTRA 2) w/Device KIT    cholestyramine (QUESTRAN) 4 g packet    dicyclomine (BENTYL) 20 mg tablet    ergocalciferol (VITAMIN D2) 50,000 units    esomeprazole (NexIUM) 40 MG capsule    famotidine (PEPCID) 20 mg tablet    fluticasone-salmeterol (Advair Diskus) 500-50 mcg/dose inhaler    gabapentin (NEURONTIN) 100 mg capsule    Glucagon, rDNA, (Glucagon Emergency) 1 MG KIT    glucose blood test strip    insulin glargine (Lantus SoloStar) 100 units/mL injection pen    Insulin Pen Needle 31G X 5 MM MISC    Lancets (ONETOUCH ULTRASOFT) lancets    metFORMIN (GLUCOPHAGE) 1000 MG tablet    montelukast (SINGULAIR) 10 mg tablet    omega-3-acid ethyl esters (LOVAZA) 1 g capsule    omeprazole (PriLOSEC) 40 MG capsule    pioglitazone (ACTOS) 45 mg tablet    azithromycin (ZITHROMAX) 500 MG tablet    No Known Allergies        Objective     Blood pressure 124/72, pulse 88, height 5' 4" (1 626 m), weight 74 8 kg (165 lb), not currently breastfeeding  Body mass index is 28 32 kg/m²        PHYSICAL EXAM:      General Appearance:   Alert, cooperative, no distress HEENT:   Normocephalic, atraumatic, anicteric      Neck:  Supple, symmetrical, trachea midline   Lungs:   Clear to auscultation bilaterally; no rales, rhonchi or wheezing; respirations unlabored    Heart[de-identified]   Regular rate and rhythm; no murmur, rub, or gallop  Abdomen:   Soft, non-tender, non-distended; normal bowel sounds; no masses, no organomegaly    Genitalia:   Deferred    Rectal:   Deferred    Extremities:  No cyanosis, clubbing or edema    Pulses:  2+ and symmetric    Skin:  No jaundice, rashes, or lesions    Lymph nodes:  No palpable cervical lymphadenopathy        Lab Results:   No visits with results within 1 Day(s) from this visit     Latest known visit with results is:   Office Visit on 07/13/2021   Component Date Value    Hepatitis B Surface Ag 07/13/2021 Non-reactive     Hepatitis C Ab 07/13/2021 Non-reactive     Hep B C IgM 07/13/2021 Non-reactive     Hep B Core Total Ab 07/13/2021 Non-reactive     Vit D, 25-Hydroxy 07/13/2021 11 3*    JIGAR 07/13/2021 Negative     CRP 09/22/6032 <4 6     Cyclic Citrullinated Pep* 07/13/2021 0 8     SS-A (RO) Ab 07/13/2021 <0 2     SS-B (LA) Ab 07/13/2021 <0 2     Sed Rate 07/13/2021 10     Rheumatoid Factor 07/13/2021 Negative          Radiology Results:   No results found

## 2021-08-05 NOTE — TELEPHONE ENCOUNTER
Upon review of the In Basket request and the patient's chart, initial outreach has been made via telephone call  The outcome of the telephone call was a fax request form to be sent to Office, please see Contacts section for details       Thank you  Benitez Garcia MA

## 2021-08-06 ENCOUNTER — TELEPHONE (OUTPATIENT)
Dept: FAMILY MEDICINE CLINIC | Facility: CLINIC | Age: 46
End: 2021-08-06

## 2021-08-06 DIAGNOSIS — F33.9 EPISODE OF RECURRENT MAJOR DEPRESSIVE DISORDER, UNSPECIFIED DEPRESSION EPISODE SEVERITY (HCC): Primary | ICD-10-CM

## 2021-08-06 NOTE — TELEPHONE ENCOUNTER
Patient is requesting is she can see a physiatrist     Please advice      For the Physical Rehab medicine patient is on waiting list  Also the new scheduled opens in sept

## 2021-08-06 NOTE — TELEPHONE ENCOUNTER
What is she looking to see a psychiatrist for? We have not discussed her mental health concerns previously       Amna Arambula DO  [facility]  8/6/2021 11:58 AM

## 2021-08-09 ENCOUNTER — TELEPHONE (OUTPATIENT)
Dept: NEUROLOGY | Facility: CLINIC | Age: 46
End: 2021-08-09

## 2021-08-09 NOTE — TELEPHONE ENCOUNTER
Best contact number for patient:  [phone]  Emergency Contact name and number:  Chaz-spouse: [phone]  Referring provider and telephone number:  Self referral  Primary Care Provider Name and if affiliated with [facility]:     Reason for Appointment/Dx:HA'S    Have you seen and followed up with a pediatric Neurologist for this disease in the past?      No (If yes ok to schedule with Dr Amarilys Daniels)    Neurology Location patient would like to be seen:    Order received? No                                                 Records Received? Yes    Have you ever seen another Neurologist?       No    Insurance Information    Insurance Name:    ID/Policy #:    Secondary Insurance:    ID/Policy#: Workman's Comp/ Accident/ School  Information      Workman's Comp/Accident/School related? No    If yes name of Insurance company:    Claim #:    Date of Injury:    Type of Injury:     Name and Telephone Number:    Notes:                   Appointment date: 11/18/21 @ 1PM W/DR Valentino Domingo  VERIFIED INSURANCE/PHONE/ADDRESS-ALL CURRENT  SENT APPT DETAILS/DIRECTIONS

## 2021-08-10 NOTE — TELEPHONE ENCOUNTER
As a follow-up, a second attempt has been made for outreach via fax, please see Contacts section for details      Thank you  Alexis Meyer MA

## 2021-08-10 NOTE — TELEPHONE ENCOUNTER
Spoke with patient and she states that she would like to see psychiatry due to depression  Advised patient that Dr Jade Mendez is out of office this week and asked her if she would like message forwarded to another provider or if okay to wait until Dr Jade Mendez returns  Patient states that she is okay to wait until Dr Jade Mendez returns for referral     Please advise

## 2021-08-13 NOTE — TELEPHONE ENCOUNTER
As a final attempt, a third outreach has been made via fax  Please see Contacts section for details  This encounter will be closed and completed by end of day  Should we receive the requested information because of previous outreach attempts, the requested patient's chart will be updated appropriately       Thank you  Jorje Pearce MA

## 2021-08-16 ENCOUNTER — TELEPHONE (OUTPATIENT)
Dept: FAMILY MEDICINE CLINIC | Facility: CLINIC | Age: 46
End: 2021-08-16

## 2021-08-16 NOTE — TELEPHONE ENCOUNTER
Referral to Psychiatry printed and emailed to pt  Left a detailed vm message for pt that it's a blank referral d/t insurance and pt advised to contact insurance company to find a participating  specialist   Original emailed to pt home

## 2021-08-17 NOTE — TELEPHONE ENCOUNTER
Spoke with patient and advised psychiatry referral placed  Advised patient that she can contact insurance company to find out who is in-network  Patient states that she will come to office to  copy of referral  Bam Cullen will be available to  for

## 2021-08-27 ENCOUNTER — TELEPHONE (OUTPATIENT)
Dept: OBGYN CLINIC | Facility: HOSPITAL | Age: 46
End: 2021-08-27

## 2021-08-27 NOTE — TELEPHONE ENCOUNTER
Please let pt know that I don't see fibromyalgia either, and that her PCP should manage or an outside rheumatologist

## 2021-08-27 NOTE — TELEPHONE ENCOUNTER
Patient went to Dr Roosevelt Reese, but she does not see Fibromyalgia  Her PCP Dr Horacio Baxter  Told her to call back and find someone that would  She had blood work at [facility], Please let patient know if you will take her on   Thanks   [phone]

## 2021-08-27 NOTE — TELEPHONE ENCOUNTER
I spoke with the patient  She has been notified that none of the [facility]ologists treat fibro  Suggested going to PCP or finding outside rheumatologist  She explained her PCP is the one who referred her to Dr Varun Delgadillo and her PCP will not and has not been giving her anything to help  I suggested then that she reach out to her insurance to find out which rheumatologists are in network for her and see if she can find one that treats fibro that way  She acknowledged

## 2021-08-30 ENCOUNTER — TELEPHONE (OUTPATIENT)
Dept: ADMINISTRATIVE | Facility: OTHER | Age: 46
End: 2021-08-30

## 2021-08-30 ENCOUNTER — OFFICE VISIT (OUTPATIENT)
Dept: FAMILY MEDICINE CLINIC | Facility: CLINIC | Age: 46
End: 2021-08-30
Payer: OTHER GOVERNMENT

## 2021-08-30 VITALS
BODY MASS INDEX: 28.34 KG/M2 | HEIGHT: 64 IN | HEART RATE: 98 BPM | TEMPERATURE: 96.9 F | OXYGEN SATURATION: 99 % | WEIGHT: 166 LBS | DIASTOLIC BLOOD PRESSURE: 64 MMHG | SYSTOLIC BLOOD PRESSURE: 104 MMHG

## 2021-08-30 DIAGNOSIS — J45.40 MODERATE PERSISTENT ASTHMA WITHOUT COMPLICATION: ICD-10-CM

## 2021-08-30 DIAGNOSIS — Z79.4 TYPE 2 DIABETES MELLITUS WITH DIABETIC POLYNEUROPATHY, WITH LONG-TERM CURRENT USE OF INSULIN (HCC): Primary | ICD-10-CM

## 2021-08-30 DIAGNOSIS — M79.7 FIBROMYALGIA: ICD-10-CM

## 2021-08-30 DIAGNOSIS — E11.42 TYPE 2 DIABETES MELLITUS WITH DIABETIC POLYNEUROPATHY, WITH LONG-TERM CURRENT USE OF INSULIN (HCC): Primary | ICD-10-CM

## 2021-08-30 LAB — SL AMB POCT HEMOGLOBIN AIC: 8.8 (ref ?–6.5)

## 2021-08-30 PROCEDURE — 83036 HEMOGLOBIN GLYCOSYLATED A1C: CPT | Performed by: FAMILY MEDICINE

## 2021-08-30 PROCEDURE — 99214 OFFICE O/P EST MOD 30 MIN: CPT | Performed by: FAMILY MEDICINE

## 2021-08-30 RX ORDER — AMITRIPTYLINE HYDROCHLORIDE 100 MG/1
100 TABLET, FILM COATED ORAL
Qty: 30 TABLET | Refills: 0 | Status: SHIPPED | OUTPATIENT
Start: 2021-08-30

## 2021-08-30 RX ORDER — ALBUTEROL SULFATE 90 UG/1
1 AEROSOL, METERED RESPIRATORY (INHALATION) EVERY 4 HOURS PRN
Qty: 6.7 G | Refills: 2 | Status: SHIPPED | OUTPATIENT
Start: 2021-08-30

## 2021-08-30 RX ORDER — GABAPENTIN 300 MG/1
CAPSULE ORAL
Qty: 180 CAPSULE | Refills: 1 | Status: SHIPPED | OUTPATIENT
Start: 2021-08-30 | End: 2021-10-18 | Stop reason: SDUPTHER

## 2021-08-30 RX ORDER — GABAPENTIN 100 MG/1
200 CAPSULE ORAL
Qty: 60 CAPSULE | Refills: 1 | Status: SHIPPED | OUTPATIENT
Start: 2021-08-30 | End: 2021-10-18 | Stop reason: DRUGHIGH

## 2021-08-30 NOTE — TELEPHONE ENCOUNTER
Upon review of the In Basket request and the patient's chart, initial outreach has been made via fax, please see Contacts section for details       Thank you  Ana Cancer, [address] [facility] [phone] Fax [phone]

## 2021-08-30 NOTE — LETTER
Diabetic Eye Exam Form    Date Requested: 21  Patient: Aurelia Jaramillo  Patient : 1975   Referring Provider: Harish Johnson DO    Dilated Retinal Exam, Optomap-Iris Exam, or Fundus Photography Done         Yes (Knik one above)         No     Date of Diabetic Eye Exam ______________________________  Left Eye      Exam did show retinopathy    Exam did not show retinopathy         Mild       Moderate       None       Proliferative       Severe     Right Eye     Exam did show retinopathy    Exam did not show retinopathy         Mild       Moderate       None       Proliferative       Severe     Comments __________________________________________________________    Practice Providing Exam ______________________________________________    Exam Performed By (print name) _______________________________________      Provider Signature ___________________________________________________      These reports are needed for  compliance    Please fax this completed form and a copy of the Diabetic Eye Exam report to our office located at [place] [address] as soon as possible to [phone] attention Matt Napier: Phone [phone]    We thank you for your assistance in treating our mutual patient

## 2021-08-30 NOTE — TELEPHONE ENCOUNTER
----- Message from Sachin Madrid sent at 8/30/2021  9:02 AM EDT -----  Regarding: Care gap request  08/30/21 9:02 AM    Hello, our patient Edson Zaragoza has had Diabetic Eye Exam completed/performed  Please assist in updating the patient chart by making an External outreach to [address] [place] [address] located in [place] The date of service is 6/2021      Thank you,  Sachin Madrid  [address]

## 2021-08-30 NOTE — LETTER
Diabetic Eye Exam Form    Date Requested: 21  Patient: Kye Ship  Patient : 1975   Referring Provider: Eugenia Logan DO    2nd Request    Dilated Retinal Exam, Optomap-Iris Exam, or Fundus Photography Done         Yes ([place]' one above)         No     Date of Diabetic Eye Exam ______________________________  Left Eye      Exam did show retinopathy    Exam did not show retinopathy         Mild       Moderate       None       Proliferative       Severe     Right Eye     Exam did show retinopathy    Exam did not show retinopathy         Mild       Moderate       None       Proliferative       Severe     Comments __________________________________________________________    Practice Providing Exam ______________________________________________    Exam Performed By (print name) _______________________________________      Provider Signature ___________________________________________________      These reports are needed for  compliance    Please fax this completed form and a copy of the Diabetic Eye Exam report to our office located at [place] [address] as soon as possible to [phone] [facility] Randolph Cruise: Phone [phone]    We thank you for your assistance in treating our mutual patient

## 2021-08-30 NOTE — PROGRESS NOTES
Assessment/Plan:    No problem-specific Assessment & Plan notes found for this encounter  Diagnoses and all orders for this visit:    Type 2 diabetes mellitus with diabetic polyneuropathy, with long-term current use of insulin (HCC)  -     POCT hemoglobin A1c  -     gabapentin (NEURONTIN) 300 mg capsule; Take 2 capsules (600 mg total) by mouth 2 (two) times a day AND 2 capsules (600 mg total) daily at bedtime   -     gabapentin (NEURONTIN) 100 mg capsule; Take 2 capsules (200 mg total) by mouth daily at bedtime    Fibromyalgia  -     amitriptyline (ELAVIL) 100 mg tablet; Take 1 tablet (100 mg total) by mouth daily at bedtime  -     gabapentin (NEURONTIN) 300 mg capsule; Take 2 capsules (600 mg total) by mouth 2 (two) times a day AND 2 capsules (600 mg total) daily at bedtime   -     gabapentin (NEURONTIN) 100 mg capsule; Take 2 capsules (200 mg total) by mouth daily at bedtime    Moderate persistent asthma without complication  -     albuterol (PROVENTIL HFA,VENTOLIN HFA) 90 mcg/act inhaler; Inhale 1 puff every 4 (four) hours as needed for wheezing      Increase Gabapentin to 600 BID and 800 nightly  Subjective:      Patient ID: Osei Mendez is a 40 y o  female  HPI     Patient presents to the office for follow up on DM and fibromyalgia  Notes that she has an appointment with Victor Manuel Alcaraz in October for therapy  Notes that she has been having a harder time to fall asleep at night  She is currently taking 100 mg nightly of the Amitriptyline  States that she is uncomfortable due to pain at night when trying to fall asleep, she also finds that she is thinking a lot about her daily stressors  PT has not started  She she is continuing to have urinary incontinence with coughing and laughing  Intermittently doing Kegel exercises  Notes that she has been noticing some better numbers at home, low 200s  POCT Hgb A1c in the office today of 8 8  She has been more complaint with her diet lately  States that she has been taking all of her medications as prescribed as well  The following portions of the patient's history were reviewed and updated as appropriate: allergies, current medications, past family history, past medical history, past social history, past surgical history and problem list     Review of Systems   Constitutional: Negative for activity change, appetite change, fatigue and fever  HENT: Negative for congestion, ear pain, rhinorrhea and sore throat  Eyes: Negative for pain  Respiratory: Negative for cough and shortness of breath  Cardiovascular: Negative for chest pain and leg swelling  Gastrointestinal: Negative for abdominal distention, abdominal pain, constipation, diarrhea, nausea and vomiting  Genitourinary: Negative for dysuria, frequency and urgency  Musculoskeletal: Positive for arthralgias and myalgias  Negative for gait problem  Skin: Negative for rash  Neurological: Negative for dizziness, light-headedness and headaches  Psychiatric/Behavioral: Positive for sleep disturbance  Negative for self-injury and suicidal ideas  The patient is not nervous/anxious  Objective:  /64 (BP Location: Left arm, Patient Position: Sitting, Cuff Size: Adult)   Pulse 98   Temp (!) 96 9 °F (36 1 °C)   Ht 5' 4" (1 626 m)   Wt 75 3 kg (166 lb)   SpO2 99%   BMI 28 49 kg/m²      Physical Exam  Vitals reviewed  Constitutional:       General: She is not in acute distress  Appearance: Normal appearance  HENT:      Head: Normocephalic and atraumatic  Right Ear: External ear normal       Left Ear: External ear normal       Nose: Nose normal       Mouth/Throat:      Mouth: Mucous membranes are moist    Eyes:      Extraocular Movements: Extraocular movements intact  Conjunctiva/sclera: Conjunctivae normal       Pupils: Pupils are equal, round, and reactive to light  Cardiovascular:      Rate and Rhythm: Normal rate and regular rhythm        Heart sounds: Normal heart sounds  Pulmonary:      Effort: Pulmonary effort is normal       Breath sounds: Normal breath sounds  No wheezing, rhonchi or rales  Abdominal:      General: Bowel sounds are normal  There is no distension  Palpations: Abdomen is soft  There is no mass  Tenderness: There is abdominal tenderness (generalized)  There is guarding (voluntary)  Hernia: No hernia is present  Musculoskeletal:      Cervical back: Neck supple  Right lower leg: No edema  Left lower leg: No edema  Lymphadenopathy:      Cervical: No cervical adenopathy  Skin:     General: Skin is warm  Capillary Refill: Capillary refill takes less than 2 seconds  Findings: No rash  Neurological:      Mental Status: She is alert  Mental status is at baseline          DO Rikki Barlow [facility]  8/30/2021 9:25 AM

## 2021-09-03 NOTE — TELEPHONE ENCOUNTER
As a follow-up, a second attempt has been made for outreach via fax, please see Contacts section for details      Thank you  Avinash Gordon, [address] [facility] [phone] Fax [phone]

## 2021-09-07 NOTE — TELEPHONE ENCOUNTER
As a final attempt, a third outreach has been made via telephone call  Please see Contacts section for details  This encounter will be closed and completed by end of day  Should we receive the requested information because of previous outreach attempts, the requested patient's chart will be updated appropriately       Thank you  Jacky Hayes, [address]

## 2021-10-18 ENCOUNTER — OFFICE VISIT (OUTPATIENT)
Dept: FAMILY MEDICINE CLINIC | Facility: CLINIC | Age: 46
End: 2021-10-18
Payer: OTHER GOVERNMENT

## 2021-10-18 VITALS
DIASTOLIC BLOOD PRESSURE: 72 MMHG | TEMPERATURE: 96.7 F | OXYGEN SATURATION: 99 % | HEIGHT: 64 IN | BODY MASS INDEX: 26.98 KG/M2 | HEART RATE: 70 BPM | WEIGHT: 158 LBS | SYSTOLIC BLOOD PRESSURE: 112 MMHG

## 2021-10-18 DIAGNOSIS — E11.42 TYPE 2 DIABETES MELLITUS WITH DIABETIC POLYNEUROPATHY, WITH LONG-TERM CURRENT USE OF INSULIN (HCC): Primary | ICD-10-CM

## 2021-10-18 DIAGNOSIS — Z79.4 TYPE 2 DIABETES MELLITUS WITH DIABETIC POLYNEUROPATHY, WITH LONG-TERM CURRENT USE OF INSULIN (HCC): Primary | ICD-10-CM

## 2021-10-18 DIAGNOSIS — Z23 ENCOUNTER FOR IMMUNIZATION: ICD-10-CM

## 2021-10-18 DIAGNOSIS — M79.7 FIBROMYALGIA: ICD-10-CM

## 2021-10-18 LAB
CREAT UR-MCNC: 352 MG/DL
MICROALBUMIN UR-MCNC: 85.6 MG/L (ref 0–20)
MICROALBUMIN/CREAT 24H UR: 24 MG/G CREATININE (ref 0–30)

## 2021-10-18 PROCEDURE — 90682 RIV4 VACC RECOMBINANT DNA IM: CPT

## 2021-10-18 PROCEDURE — 99214 OFFICE O/P EST MOD 30 MIN: CPT | Performed by: FAMILY MEDICINE

## 2021-10-18 PROCEDURE — 82570 ASSAY OF URINE CREATININE: CPT | Performed by: FAMILY MEDICINE

## 2021-10-18 PROCEDURE — 82043 UR ALBUMIN QUANTITATIVE: CPT | Performed by: FAMILY MEDICINE

## 2021-10-18 PROCEDURE — 90471 IMMUNIZATION ADMIN: CPT

## 2021-10-18 RX ORDER — INSULIN GLARGINE 100 [IU]/ML
20 INJECTION, SOLUTION SUBCUTANEOUS
Qty: 15 ML | Refills: 2 | Status: SHIPPED | OUTPATIENT
Start: 2021-10-18 | End: 2021-12-15

## 2021-10-18 RX ORDER — GABAPENTIN 100 MG/1
200 CAPSULE ORAL
Qty: 60 CAPSULE | Refills: 1 | Status: CANCELLED | OUTPATIENT
Start: 2021-10-18

## 2021-10-18 RX ORDER — GABAPENTIN 300 MG/1
300 CAPSULE ORAL 3 TIMES DAILY
Qty: 90 CAPSULE | Refills: 2 | Status: SHIPPED | OUTPATIENT
Start: 2021-10-18

## 2021-10-21 ENCOUNTER — SOCIAL WORK (OUTPATIENT)
Dept: BEHAVIORAL/MENTAL HEALTH CLINIC | Facility: CLINIC | Age: 46
End: 2021-10-21
Payer: OTHER GOVERNMENT

## 2021-10-21 DIAGNOSIS — F32.A DEPRESSION, UNSPECIFIED DEPRESSION TYPE: Primary | ICD-10-CM

## 2021-10-21 DIAGNOSIS — F41.9 ANXIETY: ICD-10-CM

## 2021-10-21 PROCEDURE — 90834 PSYTX W PT 45 MINUTES: CPT | Performed by: SOCIAL WORKER

## 2021-11-05 ENCOUNTER — OFFICE VISIT (OUTPATIENT)
Dept: GASTROENTEROLOGY | Facility: CLINIC | Age: 46
End: 2021-11-05
Payer: OTHER GOVERNMENT

## 2021-11-05 VITALS
HEART RATE: 74 BPM | WEIGHT: 159.8 LBS | HEIGHT: 64 IN | SYSTOLIC BLOOD PRESSURE: 118 MMHG | DIASTOLIC BLOOD PRESSURE: 70 MMHG | BODY MASS INDEX: 27.28 KG/M2

## 2021-11-05 DIAGNOSIS — R78.81 CAMPYLOBACTER BACTEREMIA: ICD-10-CM

## 2021-11-05 DIAGNOSIS — B96.81 CAMPYLOBACTER BACTEREMIA: ICD-10-CM

## 2021-11-05 DIAGNOSIS — R19.7 DIARRHEA, UNSPECIFIED TYPE: Primary | ICD-10-CM

## 2021-11-05 PROCEDURE — 99214 OFFICE O/P EST MOD 30 MIN: CPT | Performed by: PHYSICIAN ASSISTANT

## 2021-11-08 ENCOUNTER — APPOINTMENT (OUTPATIENT)
Dept: LAB | Facility: HOSPITAL | Age: 46
End: 2021-11-08
Payer: OTHER GOVERNMENT

## 2021-11-08 DIAGNOSIS — R19.7 DIARRHEA, UNSPECIFIED TYPE: Primary | ICD-10-CM

## 2021-11-08 DIAGNOSIS — R78.81 CAMPYLOBACTER BACTEREMIA: ICD-10-CM

## 2021-11-08 DIAGNOSIS — R19.7 DIARRHEA, UNSPECIFIED TYPE: ICD-10-CM

## 2021-11-08 DIAGNOSIS — B96.81 CAMPYLOBACTER BACTEREMIA: ICD-10-CM

## 2021-11-08 PROCEDURE — 87493 C DIFF AMPLIFIED PROBE: CPT

## 2021-11-08 PROCEDURE — 83993 ASSAY FOR CALPROTECTIN FECAL: CPT

## 2021-11-08 PROCEDURE — 87505 NFCT AGENT DETECTION GI: CPT

## 2021-11-09 PROBLEM — M79.7 FIBROMYALGIA: Status: ACTIVE | Noted: 2021-11-09

## 2021-11-09 LAB
C DIFF TOX GENS STL QL NAA+PROBE: NEGATIVE
CAMPYLOBACTER DNA SPEC NAA+PROBE: NORMAL
SALMONELLA DNA SPEC QL NAA+PROBE: NORMAL
SHIGA TOXIN STX GENE SPEC NAA+PROBE: NORMAL
SHIGELLA DNA SPEC QL NAA+PROBE: NORMAL

## 2021-11-09 RX ORDER — MONTELUKAST SODIUM 4 MG/1
2 TABLET, CHEWABLE ORAL 2 TIMES DAILY
Qty: 120 TABLET | Refills: 0 | Status: SHIPPED | OUTPATIENT
Start: 2021-11-09 | End: 2022-02-02

## 2021-11-10 LAB — CALPROTECTIN STL-MCNT: 47 UG/G (ref 0–120)

## 2021-11-15 ENCOUNTER — APPOINTMENT (EMERGENCY)
Dept: RADIOLOGY | Facility: HOSPITAL | Age: 46
End: 2021-11-15
Payer: OTHER GOVERNMENT

## 2021-11-15 ENCOUNTER — HOSPITAL ENCOUNTER (EMERGENCY)
Facility: HOSPITAL | Age: 46
Discharge: HOME | End: 2021-11-15
Attending: EMERGENCY MEDICINE
Payer: OTHER GOVERNMENT

## 2021-11-15 VITALS
HEART RATE: 68 BPM | DIASTOLIC BLOOD PRESSURE: 66 MMHG | RESPIRATION RATE: 16 BRPM | TEMPERATURE: 98.1 F | SYSTOLIC BLOOD PRESSURE: 120 MMHG | OXYGEN SATURATION: 98 %

## 2021-11-15 DIAGNOSIS — S61.309A FINGERNAIL AVULSION, PARTIAL, INITIAL ENCOUNTER: ICD-10-CM

## 2021-11-15 DIAGNOSIS — S67.192A CRUSHING INJURY OF RIGHT MIDDLE FINGER, INITIAL ENCOUNTER: Primary | ICD-10-CM

## 2021-11-15 PROCEDURE — 73140 X-RAY EXAM OF FINGER(S): CPT

## 2021-11-15 PROCEDURE — 99284 EMERGENCY DEPT VISIT MOD MDM: CPT | Performed by: EMERGENCY MEDICINE

## 2021-11-15 PROCEDURE — 99283 EMERGENCY DEPT VISIT LOW MDM: CPT

## 2021-11-16 ENCOUNTER — TELEPHONE (OUTPATIENT)
Dept: NEUROLOGY | Facility: CLINIC | Age: 46
End: 2021-11-16

## 2021-12-15 ENCOUNTER — OFFICE VISIT (OUTPATIENT)
Dept: FAMILY MEDICINE CLINIC | Facility: CLINIC | Age: 46
End: 2021-12-15
Payer: OTHER GOVERNMENT

## 2021-12-15 VITALS
DIASTOLIC BLOOD PRESSURE: 72 MMHG | WEIGHT: 160.2 LBS | OXYGEN SATURATION: 99 % | TEMPERATURE: 97.7 F | HEIGHT: 64 IN | HEART RATE: 88 BPM | BODY MASS INDEX: 27.35 KG/M2 | SYSTOLIC BLOOD PRESSURE: 118 MMHG

## 2021-12-15 DIAGNOSIS — E11.42 TYPE 2 DIABETES MELLITUS WITH DIABETIC POLYNEUROPATHY, WITH LONG-TERM CURRENT USE OF INSULIN (HCC): Primary | ICD-10-CM

## 2021-12-15 DIAGNOSIS — Z79.4 TYPE 2 DIABETES MELLITUS WITH DIABETIC POLYNEUROPATHY, WITH LONG-TERM CURRENT USE OF INSULIN (HCC): Primary | ICD-10-CM

## 2021-12-15 PROCEDURE — 99214 OFFICE O/P EST MOD 30 MIN: CPT | Performed by: FAMILY MEDICINE

## 2021-12-15 RX ORDER — INSULIN GLARGINE 100 [IU]/ML
26 INJECTION, SOLUTION SUBCUTANEOUS
Qty: 10 ML | Refills: 0 | Status: SHIPPED | OUTPATIENT
Start: 2021-12-15 | End: 2022-04-12

## 2021-12-16 ENCOUNTER — TELEPHONE (OUTPATIENT)
Dept: ADMINISTRATIVE | Facility: OTHER | Age: 46
End: 2021-12-16

## 2021-12-20 NOTE — ASSESSMENT & PLAN NOTE
With colitis, treatment as above  [Home] : at home, [unfilled] , at the time of the visit. [Medical Office: ([facility])___] : at the medical office located in  [Verbal consent obtained from patient] : the patient, [unfilled] [Follow-Up: _____] : a [unfilled] follow-up visit

## 2022-01-12 ENCOUNTER — OFFICE VISIT (OUTPATIENT)
Dept: NEUROLOGY | Facility: CLINIC | Age: 47
End: 2022-01-12
Payer: OTHER GOVERNMENT

## 2022-01-12 ENCOUNTER — TELEPHONE (OUTPATIENT)
Dept: FAMILY MEDICINE CLINIC | Facility: CLINIC | Age: 47
End: 2022-01-12

## 2022-01-12 VITALS
HEIGHT: 64 IN | DIASTOLIC BLOOD PRESSURE: 88 MMHG | TEMPERATURE: 97.9 F | SYSTOLIC BLOOD PRESSURE: 122 MMHG | BODY MASS INDEX: 27.5 KG/M2

## 2022-01-12 DIAGNOSIS — E11.42 TYPE 2 DIABETES MELLITUS WITH DIABETIC POLYNEUROPATHY, WITH LONG-TERM CURRENT USE OF INSULIN (HCC): ICD-10-CM

## 2022-01-12 DIAGNOSIS — G89.29 CHRONIC NONINTRACTABLE HEADACHE, UNSPECIFIED HEADACHE TYPE: Primary | ICD-10-CM

## 2022-01-12 DIAGNOSIS — Z79.4 TYPE 2 DIABETES MELLITUS WITH DIABETIC POLYNEUROPATHY, WITH LONG-TERM CURRENT USE OF INSULIN (HCC): ICD-10-CM

## 2022-01-12 DIAGNOSIS — R51.9 CHRONIC NONINTRACTABLE HEADACHE, UNSPECIFIED HEADACHE TYPE: ICD-10-CM

## 2022-01-12 DIAGNOSIS — G43.019 INTRACTABLE MIGRAINE WITHOUT AURA AND WITHOUT STATUS MIGRAINOSUS: Primary | ICD-10-CM

## 2022-01-12 DIAGNOSIS — G89.29 CHRONIC NONINTRACTABLE HEADACHE, UNSPECIFIED HEADACHE TYPE: ICD-10-CM

## 2022-01-12 DIAGNOSIS — R51.9 CHRONIC NONINTRACTABLE HEADACHE, UNSPECIFIED HEADACHE TYPE: Primary | ICD-10-CM

## 2022-01-12 PROCEDURE — 99204 OFFICE O/P NEW MOD 45 MIN: CPT | Performed by: PSYCHIATRY & NEUROLOGY

## 2022-01-12 RX ORDER — TOPIRAMATE 25 MG/1
25 TABLET ORAL 2 TIMES DAILY
Qty: 60 TABLET | Refills: 5 | Status: SHIPPED | OUTPATIENT
Start: 2022-01-12 | End: 2022-04-12

## 2022-01-12 NOTE — PROGRESS NOTES
Izaiah Rodriguez is a 40 y o  female  Assessment:  1  Intractable migraine without aura and without status migrainosus    2  Type 2 diabetes mellitus with diabetic polyneuropathy, with long-term current use of insulin ([facility] [address] )        Plan:  MRI scan of the brain  Blood work  Topamax 25 mg twice a day  Avoid migraine triggers  Follow-up in 2-3 months  Discussion:    Differential diagnosis discussed with the patient she has chronic migraine headaches, her headache pattern has changed and become more frequent and intense, patient has not had any imaging study and hence would recommend an MRI scan of the brain and blood work to evaluate for headache she will call me after the test to discuss the results  I advised her to avoid migraine triggers which we discussed in detail including foods to avoid, she was advised to limit caffeine and sodas to 12-16 oz per day avoid stress sleep regularly 7-8 hours a night and avoid excessive use of over-the-counter analgesics  advised to keep her blood pressure cholesterol and sugar under control    Different prophylactic medications were discussed with her she has tried Topamax in the past and is willing to go back on that side effects discussed with her including kidney stones and glaucoma she will stop if experiences any side effects will start her on 25 mg twice a day and see how she does if needed we can increase the dose in the future, she is already on amitriptyline and on gabapentin for her diabetic neuropathy pain which is being managed by her family physician that should help with her headaches also  To go to the hospital if has any worsening symptoms and call me otherwise to see me back in 2-3 months and follow up with other physicians    Subjective:    HPI       Vitals:    01/12/22 1245   BP: 122/88   BP Location: Left arm   Patient Position: Sitting   Cuff Size: Standard   Temp: 97 9 °F (36 6 °C)   TempSrc: Tympanic   Height: 5' 4" (1 626 m)       Current Medications    Current Outpatient Medications:     albuterol (PROVENTIL HFA,VENTOLIN HFA) 90 mcg/act inhaler, Inhale 1 puff every 4 (four) hours as needed for wheezing, Disp: 6 7 g, Rfl: 2    amitriptyline (ELAVIL) 100 mg tablet, Take 1 tablet (100 mg total) by mouth daily at bedtime, Disp: 30 tablet, Rfl: 0    aspirin 81 MG tablet, Take by mouth daily  , Disp: , Rfl:     atorvastatin (LIPITOR) 40 mg tablet, Take 1 tablet (40 mg total) by mouth daily at bedtime, Disp: 30 tablet, Rfl: 2    Blood Glucose Monitoring Suppl (ONE TOUCH ULTRA 2) w/Device KIT, as directed, Disp: , Rfl:     cholestyramine (QUESTRAN) 4 g packet, Take 1 packet (4 g total) by mouth 3 (three) times a day with meals, Disp: 90 packet, Rfl: 0    colestipol (COLESTID) 1 g tablet, Take 2 tablets (2 g total) by mouth 2 (two) times a day, Disp: 120 tablet, Rfl: 0    dicyclomine (BENTYL) 20 mg tablet, Take 1 tablet (20 mg total) by mouth every 6 (six) hours as needed (abdominal pain), Disp: 45 tablet, Rfl: 1    ergocalciferol (VITAMIN D2) 50,000 units, Take 1 capsule (50,000 Units total) by mouth once a week (Patient not taking: Reported on 11/5/2021), Disp: 12 capsule, Rfl: 0    esomeprazole (NexIUM) 40 MG capsule, Take 1 capsule (40 mg total) by mouth 2 (two) times a day before meals (Patient not taking: Reported on 11/5/2021), Disp: 60 capsule, Rfl: 3    famotidine (PEPCID) 20 mg tablet, Take 1 tablet (20 mg total) by mouth 2 (two) times a day (Patient not taking: Reported on 11/5/2021), Disp: 60 tablet, Rfl: 2    fluticasone-salmeterol (Advair Diskus) 500-50 mcg/dose inhaler, Inhale 1 puff daily (Patient not taking: Reported on 12/15/2021 ), Disp: 1 each, Rfl: 2    gabapentin (NEURONTIN) 300 mg capsule, Take 1 capsule (300 mg total) by mouth 3 (three) times a day, Disp: 90 capsule, Rfl: 2    Glucagon, rDNA, (Glucagon Emergency) 1 MG KIT, Inject 1 kit as directed as needed (hypoglycemia), Disp: 1 kit, Rfl: 2    glucose blood test strip, TEST BLOOD SUGARS 3 TIMES DAILY, Disp: , Rfl:     hyoscyamine (LEVSIN/SL) 0 125 mg SL tablet, Take 2 tablets (0 25 mg total) by mouth every 4 (four) hours as needed for cramping, Disp: 120 tablet, Rfl: 0    insulin glargine (Lantus) 100 units/mL subcutaneous injection, Inject 26 Units under the skin daily at bedtime, Disp: 10 mL, Rfl: 0    Insulin Pen Needle 31G X 5 MM MISC, Use 3 (three) times a day Use 1 lancet 3 times daily to inject insulin with meals  (Patient not taking: Reported on 12/15/2021 ), Disp: 100 each, Rfl: 3    Lancets (ONETOUCH ULTRASOFT) lancets, uncontrolled DM, hyperglycemia, hypertension, test 4 times daily, 250 02 150/month, Disp: , Rfl:     metFORMIN (GLUCOPHAGE) 1000 MG tablet, Take 1 tablet (1,000 mg total) by mouth 2 (two) times a day with meals (Patient not taking: Reported on 12/15/2021 ), Disp: 60 tablet, Rfl: 2    montelukast (SINGULAIR) 10 mg tablet, Take 1 tablet (10 mg total) by mouth daily at bedtime (Patient not taking: Reported on 12/15/2021 ), Disp: 30 tablet, Rfl: 2    omega-3-acid ethyl esters (LOVAZA) 1 g capsule, Take 2 capsules (2 g total) by mouth 2 (two) times a day (Patient not taking: Reported on 12/15/2021 ), Disp: 180 capsule, Rfl: 2    omeprazole (PriLOSEC) 40 MG capsule, Take 1 capsule (40 mg total) by mouth daily (Patient not taking: Reported on 2021), Disp: 30 capsule, Rfl: 2    pioglitazone (ACTOS) 45 mg tablet, Take 1 tablet (45 mg total) by mouth daily, Disp: 90 tablet, Rfl: 1      Allergies  Patient has no known allergies  Past Medical History  Past Medical History:   Diagnosis Date    Asthma     Colon polyp     Diabetes mellitus ([address] )     Fibromyalgia     Hyperlipidemia          Past Surgical History:  Past Surgical History:   Procedure Laterality Date     SECTION      CHOLECYSTECTOMY      COLONOSCOPY N/A 10/24/2018    Procedure: COLONOSCOPY;  Surgeon: Juana Segura MD;  Location: [facility];   Service: Gastroenterology  ESOPHAGOGASTRODUODENOSCOPY N/A 10/26/2018    Procedure: ESOPHAGOGASTRODUODENOSCOPY (EGD); Surgeon: Jammie Starks MD;  Location: [facility]; Service: Gastroenterology    TUBAL LIGATION           Family History:  Family History   Problem Relation Age of Onset    Diabetes Mother     No Known Problems Sister     No Known Problems Sister     No Known Problems Maternal Aunt     No Known Problems Maternal Aunt     No Known Problems Maternal Aunt     No Known Problems Maternal Aunt     No Known Problems Maternal Aunt     Cancer Maternal Aunt     Breast cancer Cousin 44    Prostate cancer Maternal Uncle     Prostate cancer Maternal Uncle     Cancer Maternal Uncle        Social History:   reports that she has never smoked  She has never used smokeless tobacco  She reports current alcohol use  She reports that she does not use drugs  I have reviewed the past medical history, surgical history, social and family history, current medications, allergies vitals, review of systems, and updated this information as appropriate today  Objective:    Physical Exam    Neurological Exam    GENERAL:  Cooperative in no acute distress  Well-developed and well-nourished    HEAD and NECK   Head is atraumatic normocephalic with no lesions or masses  Neck is supple with full range of motion    CARDIOVASCULAR  Carotid Arteries-no carotid bruits  NEUROLOGIC:  Mental Status-the patient is awake alert and oriented without aphasia or apraxia  Cranial Nerves: Visual fields are full to confrontation  Visual acuity is 20/20 with hand-held chart funduscopic examination could not be done as pupils were not dilated  Extraocular movements are full without nystagmus  Pupils are 2-1/2 mm and reactive  Face is symmetrical to light touch  Movements of facial expression move symmetrically  Hearing is normal to finger rub bilaterally  Soft palate lifts symmetrically   Shoulder shrug is symmetrical  Tongue is midline without atrophy  Motor: No drift is noted on arm extension  Strength is full in the upper and lower extremities with normal bulk and tone  Sensory: Intact to temperature and vibratory sensation in the upper and lower extremities bilaterally  Cortical function is intact  Coordination: Finger to nose testing is performed accurately  Romberg is negative  Gait reveals a normal base with symmetrical arm swing  Tandem walk is normal   Reflexes:     2+ and symmetrical  Toes are downgoing  No spine tenderness, temporal artery tenderness          ROS:  Review of Systems   Constitutional: Negative  Negative for appetite change and fever  HENT: Negative  Negative for hearing loss, tinnitus, trouble swallowing and voice change  Eyes: Positive for photophobia (with migraines)  Negative for pain  Respiratory: Negative  Negative for shortness of breath  Cardiovascular: Negative  Negative for palpitations  Gastrointestinal: Negative  Negative for nausea and vomiting  Endocrine: Negative  Negative for cold intolerance  Genitourinary: Negative  Negative for dysuria, frequency and urgency  Musculoskeletal: Positive for myalgias  Negative for neck pain  Skin: Negative  Negative for rash  Neurological: Positive for speech difficulty and headaches  Negative for dizziness, tremors, seizures, syncope, facial asymmetry, weakness, light-headedness and numbness  Hematological: Negative  Does not bruise/bleed easily  Psychiatric/Behavioral: Positive for confusion  Negative for hallucinations and sleep disturbance

## 2022-01-12 NOTE — TELEPHONE ENCOUNTER
T/c from [facility] -- pt is seeing them for chronic headaches and has an insurance which requires a doctor to doctor referral     Requesting Dr Rocio Lama put in neurology referral for pt

## 2022-01-18 ENCOUNTER — APPOINTMENT (OUTPATIENT)
Dept: LAB | Facility: HOSPITAL | Age: 47
End: 2022-01-18
Payer: OTHER GOVERNMENT

## 2022-01-18 ENCOUNTER — OFFICE VISIT (OUTPATIENT)
Dept: DERMATOLOGY | Facility: CLINIC | Age: 47
End: 2022-01-18
Payer: OTHER GOVERNMENT

## 2022-01-18 VITALS — TEMPERATURE: 93.6 F | WEIGHT: 159.4 LBS | BODY MASS INDEX: 27.36 KG/M2

## 2022-01-18 DIAGNOSIS — Z13.89 SCREENING FOR SKIN CONDITION: ICD-10-CM

## 2022-01-18 DIAGNOSIS — D23.71: ICD-10-CM

## 2022-01-18 DIAGNOSIS — G43.019 INTRACTABLE MIGRAINE WITHOUT AURA AND WITHOUT STATUS MIGRAINOSUS: ICD-10-CM

## 2022-01-18 DIAGNOSIS — L72.11 PILAR CYST: Primary | ICD-10-CM

## 2022-01-18 DIAGNOSIS — D23.72: ICD-10-CM

## 2022-01-18 LAB
ALBUMIN SERPL BCP-MCNC: 3.6 G/DL (ref 3.5–5)
ALP SERPL-CCNC: 80 U/L (ref 46–116)
ALT SERPL W P-5'-P-CCNC: 37 U/L (ref 12–78)
ANION GAP SERPL CALCULATED.3IONS-SCNC: 13 MMOL/L (ref 4–13)
AST SERPL W P-5'-P-CCNC: 11 U/L (ref 5–45)
BILIRUB SERPL-MCNC: 0.78 MG/DL (ref 0.2–1)
BUN SERPL-MCNC: 11 MG/DL (ref 5–25)
CALCIUM SERPL-MCNC: 9.1 MG/DL (ref 8.3–10.1)
CHLORIDE SERPL-SCNC: 100 MMOL/L (ref 100–108)
CO2 SERPL-SCNC: 21 MMOL/L (ref 21–32)
CREAT SERPL-MCNC: 0.93 MG/DL (ref 0.6–1.3)
ERYTHROCYTE [DISTWIDTH] IN BLOOD BY AUTOMATED COUNT: 11.4 % (ref 11.6–15.1)
GFR SERPL CREATININE-BSD FRML MDRD: 74 ML/MIN/1.73SQ M
GLUCOSE P FAST SERPL-MCNC: 426 MG/DL (ref 65–99)
HCT VFR BLD AUTO: 44.9 % (ref 34.8–46.1)
HGB BLD-MCNC: 16.8 G/DL (ref 11.5–15.4)
MCH RBC QN AUTO: 34.2 PG (ref 26.8–34.3)
MCHC RBC AUTO-ENTMCNC: 37.4 G/DL (ref 31.4–37.4)
MCV RBC AUTO: 91 FL (ref 82–98)
PLATELET # BLD AUTO: 261 THOUSANDS/UL (ref 149–390)
PMV BLD AUTO: 10.5 FL (ref 8.9–12.7)
POTASSIUM SERPL-SCNC: 3.8 MMOL/L (ref 3.5–5.3)
PROT SERPL-MCNC: 8 G/DL (ref 6.4–8.2)
RBC # BLD AUTO: 4.91 MILLION/UL (ref 3.81–5.12)
SODIUM SERPL-SCNC: 134 MMOL/L (ref 136–145)
TSH SERPL DL<=0.05 MIU/L-ACNC: 1.3 UIU/ML (ref 0.36–3.74)
WBC # BLD AUTO: 6.17 THOUSAND/UL (ref 4.31–10.16)

## 2022-01-18 PROCEDURE — 80053 COMPREHEN METABOLIC PANEL: CPT

## 2022-01-18 PROCEDURE — 36415 COLL VENOUS BLD VENIPUNCTURE: CPT

## 2022-01-18 PROCEDURE — 84443 ASSAY THYROID STIM HORMONE: CPT

## 2022-01-18 PROCEDURE — 85027 COMPLETE CBC AUTOMATED: CPT

## 2022-01-18 PROCEDURE — 99203 OFFICE O/P NEW LOW 30 MIN: CPT | Performed by: DERMATOLOGY

## 2022-01-18 PROCEDURE — 86618 LYME DISEASE ANTIBODY: CPT

## 2022-01-18 NOTE — PROGRESS NOTES
[facility] DERMATOLOGY  [address] [place] [address]  Tyra Hernandez [place] [address]  [address]  [phone]     MRN: [id number] : 1975  Encounter: [id number]  Patient Information: Carmelia Cooks  Chief complaint:  Lesion on scalp and also dark marks on her legs    History of present illness:  52-year-old female presents secondary to concerns regarding cyst on the left frontal scalp that has been present for some time his slowly getting larger also concerned regarding dark marks that she is concerned about on the lower leg that have been there for quite a while no other concerns noted  Past Medical History:   Diagnosis Date    Asthma     Colon polyp     Diabetes mellitus ([address] )     Fibromyalgia     Hyperlipidemia      Past Surgical History:   Procedure Laterality Date     SECTION      CHOLECYSTECTOMY      COLONOSCOPY N/A 10/24/2018    Procedure: COLONOSCOPY;  Surgeon: Yesi Preston MD;  [facility]; Service: Gastroenterology    ESOPHAGOGASTRODUODENOSCOPY N/A 10/26/2018    Procedure: ESOPHAGOGASTRODUODENOSCOPY (EGD); Surgeon: Yesi Preston MD;  Location[facility];   Service: Gastroenterology    TUBAL LIGATION       Social History   Social History     Substance and Sexual Activity   Alcohol Use Yes    Comment: occasionally     Social History     Substance and Sexual Activity   Drug Use Never     Social History     Tobacco Use   Smoking Status Never Smoker   Smokeless Tobacco Never Used     Family History   Problem Relation Age of Onset    Diabetes Mother     No Known Problems Sister     No Known Problems Sister     No Known Problems Maternal Aunt     No Known Problems Maternal Aunt     No Known Problems Maternal Aunt     No Known Problems Maternal Aunt     No Known Problems Maternal Aunt     Cancer Maternal Aunt     Breast cancer Cousin 44    Prostate cancer Maternal Uncle     Prostate cancer Maternal Uncle     Cancer Maternal Uncle Meds/Allergies   No Known Allergies    Meds:  Prior to Admission medications    Medication Sig Start Date End Date Taking?  Authorizing Provider   albuterol (PROVENTIL HFA,VENTOLIN HFA) 90 mcg/act inhaler Inhale 1 puff every 4 (four) hours as needed for wheezing 8/30/21  Yes Syed Ridley DO   amitriptyline (ELAVIL) 100 mg tablet Take 1 tablet (100 mg total) by mouth daily at bedtime 8/30/21  Yes Syed Ridley DO   atorvastatin (LIPITOR) 40 mg tablet Take 1 tablet (40 mg total) by mouth daily at bedtime 9/16/20  Yes Syed Ridley DO   Blood Glucose Monitoring Suppl (ONE TOUCH ULTRA 2) w/Device KIT as directed 6/14/18  Yes Historical Provider, MD   gabapentin (NEURONTIN) 300 mg capsule Take 1 capsule (300 mg total) by mouth 3 (three) times a day 10/18/21  Yes Syed Ridley DO   glucose blood test strip TEST BLOOD SUGARS 3 TIMES DAILY 9/20/18  Yes Historical Provider, MD   insulin glargine (Lantus) 100 units/mL subcutaneous injection Inject 26 Units under the skin daily at bedtime 12/15/21  Yes Syed Ridley DO   Lancets [facility] ULTRASOFT) lancets uncontrolled DM, hyperglycemia, hypertension, test 4 times daily, 250 02 150/month 9/20/18  Yes Historical Provider, MD   metFORMIN (GLUCOPHAGE) 1000 MG tablet Take 1 tablet (1,000 mg total) by mouth 2 (two) times a day with meals 10/18/21  Yes Syed Ridley DO   topiramate (Topamax) 25 mg tablet Take 1 tablet (25 mg total) by mouth 2 (two) times a day 1/12/22  Yes Monica Henry MD   aspirin 81 MG tablet Take by mouth daily    Patient not taking: Reported on 1/18/2022 9/20/18   Historical Provider, MD   cholestyramine (QUESTRAN) 4 g packet Take 1 packet (4 g total) by mouth 3 (three) times a day with meals  Patient not taking: Reported on 1/18/2022 2/25/21   Phani Campuzano PA-C   colestipol (COLESTID) 1 g tablet Take 2 tablets (2 g total) by mouth 2 (two) times a day  Patient not taking: Reported on 1/12/2022 11/9/21 12/9/21  Wesley Goldstein PA-C dicyclomine (BENTYL) 20 mg tablet Take 1 tablet (20 mg total) by mouth every 6 (six) hours as needed (abdominal pain)  Patient not taking: Reported on 1/18/2022 2/25/21   Annie Ferrera PA-C   ergocalciferol (VITAMIN D2) 50,000 units Take 1 capsule (50,000 Units total) by mouth once a week  Patient not taking: Reported on 11/5/2021 7/16/21   Carla Machuca,    esomeprazole (NexIUM) 40 MG capsule Take 1 capsule (40 mg total) by mouth 2 (two) times a day before meals  Patient not taking: Reported on 11/5/2021 4/15/21   Annie Ferrera PA-C   famotidine (PEPCID) 20 mg tablet Take 1 tablet (20 mg total) by mouth 2 (two) times a day  Patient not taking: Reported on 11/5/2021 9/16/20 Aleta Monday, DO   fluticasone-salmeterol (Advair Diskus) 500-50 mcg/dose inhaler Inhale 1 puff daily  Patient not taking: Reported on 12/15/2021  9/16/20   Thea Monday, DO   Glucagon, rDNA, (Glucagon Emergency) 1 MG KIT Inject 1 kit as directed as needed (hypoglycemia)  Patient not taking: Reported on 1/18/2022 5/12/21 Aleta Monday, DO   hyoscyamine (LEVSIN/SL) 0 125 mg SL tablet Take 2 tablets (0 25 mg total) by mouth every 4 (four) hours as needed for cramping  Patient not taking: Reported on 1/12/2022 11/5/21 12/5/21  Lisbet Eid PA-C   Insulin Pen Needle 31G X 5 MM MISC Use 3 (three) times a day Use 1 lancet 3 times daily to inject insulin with meals    Patient not taking: Reported on 12/15/2021  5/13/21   Thea Monday, DO   montelukast (SINGULAIR) 10 mg tablet Take 1 tablet (10 mg total) by mouth daily at bedtime 9/16/20 Aleta Monday, DO   utmaa-3-axwd ethyl esters (LOVAZA) 1 g capsule Take 2 capsules (2 g total) by mouth 2 (two) times a day  Patient not taking: Reported on 12/15/2021  2/12/21   Thea Monday, DO   omeprazole (PriLOSEC) 40 MG capsule Take 1 capsule (40 mg total) by mouth daily  Patient not taking: Reported on 11/5/2021 2/25/21   Annie Ferrera PA-C   pioglitazone (ACTOS) 45 mg tablet Take 1 tablet (45 mg total) by mouth daily  Patient not taking: Reported on 1/12/2022 8/2/21   Lluvia Wise DO       Subjective:     Review of Systems:    General: negative for - chills, fatigue, fever,  weight gain or weight loss  Psychological: negative for - anxiety, behavioral disorder, concentration difficulties, decreased libido, depression, irritability, memory difficulties, mood swings, sleep disturbances or suicidal ideation  ENT: negative for - hearing difficulties , nasal congestion, nasal discharge, oral lesions, sinus pain, sneezing, sore throat  Allergy and Immunology: negative for - hives, insect bite sensitivity,  Hematological and Lymphatic: negative for - bleeding problems, blood clots,bruising, swollen lymph nodes  Endocrine: negative for - hair pattern changes, hot flashes, malaise/lethargy, mood swings, palpitations, polydipsia/polyuria, skin changes, temperature intolerance or unexpected weight change  Respiratory: negative for - cough, hemoptysis, orthopnea, shortness of breath, or wheezing  Cardiovascular: negative for - chest pain, dyspnea on exertion, edema,  Gastrointestinal: negative for - abdominal pain, nausea/vomiting  Genito-Urinary: negative for - dysuria, incontinence, irregular/heavy menses or urinary frequency/urgency  Musculoskeletal: negative for - gait disturbance, joint pain, joint stiffness, joint swelling, muscle pain, muscular weakness  Dermatological:  As in HPI  Neurological: negative for confusion, dizziness, headaches, impaired coordination/balance, memory loss, numbness/tingling, seizures, speech problems, tremors or weakness       Objective:   Temp (!) 93 6 °F (34 2 °C) (Temporal)   Wt 72 3 kg (159 lb 6 4 oz)   BMI 27 36 kg/m²     Physical Exam:    General Appearance:    Alert, cooperative, no distress   Head:    Normocephalic, without obvious abnormality, atraumatic           Skin:   A full skin exam was performed including scalp, head scalp, eyes, ears, nose, lips, neck, chest, axilla, abdomen, back, buttocks, bilateral upper extremities, bilateral lower extremities, hands, feet, fingers, toes, fingernails, and toenails 1 5 cm  cystic nodule noted on left frontal scalp dome-shaped papules with hyperpigmentation with positive pinch sign noted on the lower legs nothing else remarkable noted on complete exam       Assessment:     1  Pilar cyst     2  Dermatofibroma of both lower extremities     3  Screening for skin condition           Plan:   Pilar cyst advised patient that this is from hair follicles that ballooned out and forms this type of growth  No treatment indicated unless patient so desires or if lesion enlarges or changes  Dermatofibroma patient reassured these are normal growths which occur from either insect bite reaction or lesion that was irritated  No treatment indicated unless these are appearing to getting larger or changing  Screening for Dermatologic Disorders: Nothing else of concern noted on complete exam follow up in prn      Chevy Hernandez MD  1/18/2022,11:04 AM    Portions of the record may have been created with voice recognition software   Occasional wrong word or "sound a like" substitutions may have occurred due to the inherent limitations of voice recognition software   Read the chart carefully and recognize, using context, where substitutions have occurred

## 2022-01-18 NOTE — PATIENT INSTRUCTIONS
Pilar cyst advised patient that this is from hair follicles that ballooned out and forms this type of growth  No treatment indicated unless patient so desires or if lesion enlarges or changes  Dermatofibroma patient reassured these are normal growths which occur from either insect bite reaction or lesion that was irritated    No treatment indicated unless these are appearing to getting larger or changing  Screening for Dermatologic Disorders: Nothing else of concern noted on complete exam follow up in prn

## 2022-01-19 ENCOUNTER — TELEPHONE (OUTPATIENT)
Dept: NEUROLOGY | Facility: CLINIC | Age: 47
End: 2022-01-19

## 2022-01-19 LAB — B BURGDOR IGG+IGM SER-ACNC: 91

## 2022-01-19 NOTE — TELEPHONE ENCOUNTER
----- Message from Kal Blackburn MD sent at 1/18/2022 11:34 AM EST -----  Please call the patient regarding her abnormal result    Please have the patient follow up with family physician regarding abnormal blood work including low sodium and increased blood sugars

## 2022-01-31 ENCOUNTER — TELEPHONE (OUTPATIENT)
Dept: FAMILY MEDICINE CLINIC | Facility: CLINIC | Age: 47
End: 2022-01-31

## 2022-01-31 NOTE — TELEPHONE ENCOUNTER
I took a call from [email or web address]  [address] [place], [place], [place]  [phone]    Office was reviewing if pt s an active pt in care of Dr Pepe and last office visit (12/15/2021)  Office will send fax for I medical supply request (for incontinence supply)  Myron Florence our office fax #

## 2022-02-02 ENCOUNTER — HOSPITAL ENCOUNTER (OUTPATIENT)
Dept: MRI IMAGING | Facility: CLINIC | Age: 47
Discharge: HOME | End: 2022-02-02
Payer: OTHER GOVERNMENT

## 2022-02-02 ENCOUNTER — OFFICE VISIT (OUTPATIENT)
Dept: GASTROENTEROLOGY | Facility: CLINIC | Age: 47
End: 2022-02-02
Payer: OTHER GOVERNMENT

## 2022-02-02 VITALS
HEIGHT: 64 IN | SYSTOLIC BLOOD PRESSURE: 124 MMHG | WEIGHT: 159 LBS | DIASTOLIC BLOOD PRESSURE: 80 MMHG | HEART RATE: 85 BPM | BODY MASS INDEX: 27.14 KG/M2

## 2022-02-02 DIAGNOSIS — G43.019 INTRACTABLE MIGRAINE WITHOUT AURA AND WITHOUT STATUS MIGRAINOSUS: ICD-10-CM

## 2022-02-02 DIAGNOSIS — R19.7 DIARRHEA, UNSPECIFIED TYPE: Primary | ICD-10-CM

## 2022-02-02 PROCEDURE — 99213 OFFICE O/P EST LOW 20 MIN: CPT | Performed by: PHYSICIAN ASSISTANT

## 2022-02-02 PROCEDURE — G1004 CDSM NDSC: HCPCS

## 2022-02-02 PROCEDURE — 70551 MRI BRAIN STEM W/O DYE: CPT

## 2022-02-02 RX ORDER — DICYCLOMINE HCL 20 MG
20 TABLET ORAL EVERY 6 HOURS PRN
Qty: 60 TABLET | Refills: 3 | Status: SHIPPED | OUTPATIENT
Start: 2022-02-02

## 2022-02-02 RX ORDER — MONTELUKAST SODIUM 4 MG/1
1 TABLET, CHEWABLE ORAL 3 TIMES DAILY
Qty: 90 TABLET | Refills: 3 | Status: SHIPPED | OUTPATIENT
Start: 2022-02-02

## 2022-02-02 NOTE — PROGRESS NOTES
[facility] - Outpatient Follow-up Note  Shelley Chuodhary 40 y o  female MRN: [id number]  Encounter: [id number]          ASSESSMENT AND PLAN:      1  Diarrhea, unspecified type  She has suffered with diarrhea and abdominal pain since her gallbladder was removed more than 20 years ago  She now has good insurance and is excited to pursue medicines  Will start with Dicyclomine and Colestid    Consider Xifaxan, Consider Lotronex, Consider low dose Viberzi   ______________________________________________________________________    SUBJECTIVE:  69-year-old female presents for follow-up of diarrhea  This has been increasingly problematic over the past 20 years  It became intense after her gallbladder was removed  She has tried several medications over the years but most of the recent medicines called in were unaffordable for her as she did not have good insurance  She recently got on medical assistance and is now excited to try different medical therapies  She had a normal colonoscopy last year  She denies any rectal bleeding or unexpected weight loss  There has been no change in her symptoms over the past few months  She does try to utilize caution with her diet  She recently considered going being given as her daughter is vegan  She does not eat a very high fat diet  REVIEW OF SYSTEMS IS OTHERWISE NEGATIVE  Historical Information   Past Medical History:   Diagnosis Date    Asthma     Colon polyp     Diabetes mellitus ([address] )     Fibromyalgia     Hyperlipidemia      Past Surgical History:   Procedure Laterality Date     SECTION      CHOLECYSTECTOMY      COLONOSCOPY N/A 10/24/2018    Procedure: COLONOSCOPY;  Surgeon: Ha Gross MD;  Location[facility]; Service: Gastroenterology    ESOPHAGOGASTRODUODENOSCOPY N/A 10/26/2018    Procedure: ESOPHAGOGASTRODUODENOSCOPY (EGD); Surgeon: Ha Gross MD;  Location[facility];   Service: Gastroenterology    TUBAL LIGATION       Social History   Social History     Substance and Sexual Activity   Alcohol Use Yes    Comment: occasionally     Social History     Substance and Sexual Activity   Drug Use Never     Social History     Tobacco Use   Smoking Status Never Smoker   Smokeless Tobacco Never Used     Family History   Problem Relation Age of Onset    Diabetes Mother     No Known Problems Sister     No Known Problems Sister     No Known Problems Maternal Aunt     No Known Problems Maternal Aunt     No Known Problems Maternal Aunt     No Known Problems Maternal Aunt     No Known Problems Maternal Aunt     Cancer Maternal Aunt     Breast cancer Cousin 44    Prostate cancer Maternal Uncle     Prostate cancer Maternal Uncle     Cancer Maternal Uncle        Meds/Allergies       Current Outpatient Medications:     albuterol (PROVENTIL HFA,VENTOLIN HFA) 90 mcg/act inhaler    amitriptyline (ELAVIL) 100 mg tablet    atorvastatin (LIPITOR) 40 mg tablet    Blood Glucose Monitoring Suppl (ONE TOUCH ULTRA 2) w/Device KIT    gabapentin (NEURONTIN) 300 mg capsule    glucose blood test strip    insulin glargine (Lantus) 100 units/mL subcutaneous injection    Lancets (ONETOUCH ULTRASOFT) lancets    metFORMIN (GLUCOPHAGE) 1000 MG tablet    montelukast (SINGULAIR) 10 mg tablet    topiramate (Topamax) 25 mg tablet    colestipol (COLESTID) 1 g tablet    dicyclomine (BENTYL) 20 mg tablet    No Known Allergies        Objective     Blood pressure 124/80, pulse 85, height 5' 4" (1 626 m), weight 72 1 kg (159 lb), not currently breastfeeding  Body mass index is 27 29 kg/m²  PHYSICAL EXAM:      General Appearance:   Alert, cooperative, no distress   HEENT:   Normocephalic, atraumatic, anicteric      Neck:  Supple, symmetrical, trachea midline   Lungs:   Clear to auscultation bilaterally; no rales, rhonchi or wheezing; respirations unlabored    Heart[de-identified]   Regular rate and rhythm; no murmur, rub, or gallop     Abdomen:   Soft, non-tender, non-distended; normal bowel sounds; no masses, no organomegaly    Genitalia:   Deferred    Rectal:   Deferred    Extremities:  No cyanosis, clubbing or edema    Pulses:  2+ and symmetric    Skin:  No jaundice, rashes, or lesions    Lymph nodes:  No palpable cervical lymphadenopathy        Lab Results:   No visits with results within 1 Day(s) from this visit  Latest known visit with results is:   Appointment on 01/18/2022   Component Date Value    Lyme total antibody 01/18/2022 91     WBC 01/18/2022 6 17     RBC 01/18/2022 4 91     Hemoglobin 01/18/2022 16 8*    Hematocrit 01/18/2022 44 9     MCV 01/18/2022 91     MCH 01/18/2022 34 2     MCHC 01/18/2022 37 4     RDW 01/18/2022 11 4*    Platelets 64/93/4562 261     MPV 01/18/2022 10 5     Sodium 01/18/2022 134*    Potassium 01/18/2022 3 8     Chloride 01/18/2022 100     CO2 01/18/2022 21     ANION GAP 01/18/2022 13     BUN 01/18/2022 11     Creatinine 01/18/2022 0 93     Glucose, Fasting 01/18/2022 426*    Calcium 01/18/2022 9 1     AST 01/18/2022 11     ALT 01/18/2022 37     Alkaline Phosphatase 01/18/2022 80     Total Protein 01/18/2022 8 0     Albumin 01/18/2022 3 6     Total Bilirubin 01/18/2022 0 78     eGFR 01/18/2022 74     TSH 3RD GENERATON 01/18/2022 1 304          Radiology Results:   MRI brain without contrast    Result Date: 2/2/2022  Narrative: MRI BRAIN WITHOUT CONTRAST INDICATION: G43 019: Migraine without aura, intractable, without status migrainosus  COMPARISON:   None  TECHNIQUE:  Sagittal T1, axial T2, axial FLAIR, axial T1, axial [place] and axial diffusion imaging  IMAGE QUALITY:  Diagnostic  FINDINGS: BRAIN PARENCHYMA:  There is no discrete mass, mass effect or midline shift  There is no intracranial hemorrhage  There is no evidence of acute infarction and diffusion imaging is unremarkable  There are no white matter changes in the cerebral hemispheres  VENTRICLES:  Normal for the patient's age  SELLA AND PITUITARY GLAND:  Normal  ORBITS:  Normal  PARANASAL SINUSES:  Normal  VASCULATURE:  Evaluation of the major intracranial vasculature demonstrates appropriate flow voids   CALVARIUM AND SKULL BASE:  Normal  EXTRACRANIAL SOFT TISSUES:  Left frontal extracranial subcutaneous tissues mass likely sebaceous cyst     Impression: Normal  Workstation performed: [id number]

## 2022-02-04 ENCOUNTER — IMMUNIZATIONS (OUTPATIENT)
Dept: FAMILY MEDICINE CLINIC | Facility: HOSPITAL | Age: 47
End: 2022-02-04

## 2022-03-08 ENCOUNTER — TELEPHONE (OUTPATIENT)
Dept: GASTROENTEROLOGY | Facility: CLINIC | Age: 47
End: 2022-03-08

## 2022-03-08 NOTE — TELEPHONE ENCOUNTER
[facility] - patient's voice mail not set up  Called patient to see if she can come in earlier at 10:30 instead of 11:30

## 2022-03-09 ENCOUNTER — OFFICE VISIT (OUTPATIENT)
Dept: GASTROENTEROLOGY | Facility: CLINIC | Age: 47
End: 2022-03-09
Payer: OTHER GOVERNMENT

## 2022-03-09 ENCOUNTER — SOCIAL WORK (OUTPATIENT)
Dept: BEHAVIORAL/MENTAL HEALTH CLINIC | Facility: CLINIC | Age: 47
End: 2022-03-09
Payer: OTHER GOVERNMENT

## 2022-03-09 VITALS
RESPIRATION RATE: 16 BRPM | BODY MASS INDEX: 27.14 KG/M2 | SYSTOLIC BLOOD PRESSURE: 118 MMHG | HEIGHT: 64 IN | TEMPERATURE: 98 F | DIASTOLIC BLOOD PRESSURE: 82 MMHG | OXYGEN SATURATION: 99 % | HEART RATE: 77 BPM | WEIGHT: 159 LBS

## 2022-03-09 DIAGNOSIS — K58.0 IRRITABLE BOWEL SYNDROME WITH DIARRHEA: Primary | ICD-10-CM

## 2022-03-09 DIAGNOSIS — F41.9 ANXIETY: Primary | ICD-10-CM

## 2022-03-09 DIAGNOSIS — F33.1 MODERATE EPISODE OF RECURRENT MAJOR DEPRESSIVE DISORDER (HCC): ICD-10-CM

## 2022-03-09 PROCEDURE — 99213 OFFICE O/P EST LOW 20 MIN: CPT | Performed by: PHYSICIAN ASSISTANT

## 2022-03-09 PROCEDURE — 90832 PSYTX W PT 30 MINUTES: CPT | Performed by: SOCIAL WORKER

## 2022-03-09 NOTE — PSYCH
9: 30am-10:00am    Assessment/Plan: f/u in three weeks     There are no diagnoses linked to this encounter  Subjective: Therapist met w/pt for individual session  Pt reports an increase in symptoms including: hopelessness, irritability, lack of motivation, lack of energy, difficulty sleeping  She reports that she has taken some steps to work on improving her current situation but in some ways feels as if there are more obstacles including financial issues  Pt shared she is utilizing her supports but feels as if medication may be needed  She stated she constantly feels on edge  She denied any SI but reports feeling so overwhelmed at times  Discussion was held on strategies to help pt manage her symptoms more effectively  Patient ID: Castillo Morgan is a 40 y o  female  HPI 30 minutes    Review of Systems      Objective: Pt appeared to be stressed/anxious but easily engaged         Physical Exam  Pt denied any SI/HI/Ah/Vh

## 2022-03-09 NOTE — PROGRESS NOTES
[facility] - Outpatient Follow-up Note  Gary Gary 40 y o  female MRN: [id number]  Encounter: [id number]          ASSESSMENT AND PLAN:      1  Irritable bowel syndrome with diarrhea  She is on Colestid 1 pill TID  She is having less frequent stools but symptoms are still severe    Will trial xifaxan with Colestid  Consider switch to lotronex    ______________________________________________________________________    SUBJECTIVE:  69-year-old female with diarrhea predominant irritable bowel syndrome presents for follow-up  She is taking Colestid 1 pill 3 times daily  She reports that the frequency of her stools has improved from about 8 a day to 5 a day but is still severe when it occurs  She reports ongoing episodes of urgency which is so severe that she will have incontinence  She has resorted to wearing depends  She admits to ongoing left-sided abdominal pain  She denies any rectal bleeding  She had a normal colonoscopy last year  REVIEW OF SYSTEMS IS OTHERWISE NEGATIVE  Historical Information   Past Medical History:   Diagnosis Date    Asthma     Colon polyp     Diabetes mellitus ([address] )     Fibromyalgia     Hyperlipidemia      Past Surgical History:   Procedure Laterality Date     SECTION      CHOLECYSTECTOMY      COLONOSCOPY N/A 10/24/2018    Procedure: COLONOSCOPY;  Surgeon: Kaley Lewis MD;  [facility]; Service: Gastroenterology    ESOPHAGOGASTRODUODENOSCOPY N/A 10/26/2018    Procedure: ESOPHAGOGASTRODUODENOSCOPY (EGD); Surgeon: Kaley Lewis MD;  [facility];   Service: Gastroenterology    TUBAL LIGATION       Social History   Social History     Substance and Sexual Activity   Alcohol Use Yes    Comment: occasionally     Social History     Substance and Sexual Activity   Drug Use Never     Social History     Tobacco Use   Smoking Status Never Smoker   Smokeless Tobacco Never Used     Family History   Problem Relation Age of Onset    Diabetes Mother     No Known Problems Sister     No Known Problems Sister     No Known Problems Maternal Aunt     No Known Problems Maternal Aunt     No Known Problems Maternal Aunt     No Known Problems Maternal Aunt     No Known Problems Maternal Aunt     Cancer Maternal Aunt     Breast cancer Cousin 44    Prostate cancer Maternal Uncle     Prostate cancer Maternal Uncle     Cancer Maternal Uncle        Meds/Allergies       Current Outpatient Medications:     albuterol (PROVENTIL HFA,VENTOLIN HFA) 90 mcg/act inhaler    amitriptyline (ELAVIL) 100 mg tablet    atorvastatin (LIPITOR) 40 mg tablet    Blood Glucose Monitoring Suppl (ONE TOUCH ULTRA 2) w/Device KIT    colestipol (COLESTID) 1 g tablet    dicyclomine (BENTYL) 20 mg tablet    gabapentin (NEURONTIN) 300 mg capsule    glucose blood test strip    insulin glargine (Lantus) 100 units/mL subcutaneous injection    Lancets (ONETOUCH ULTRASOFT) lancets    metFORMIN (GLUCOPHAGE) 1000 MG tablet    montelukast (SINGULAIR) 10 mg tablet    topiramate (Topamax) 25 mg tablet    rifaximin (XIFAXAN) 550 mg tablet    No Known Allergies        Objective     Blood pressure 118/82, pulse 77, temperature 98 °F (36 7 °C), temperature source Temporal, resp  rate 16, height 5' 4" (1 626 m), weight 72 1 kg (159 lb), SpO2 99 %, not currently breastfeeding  Body mass index is 27 29 kg/m²  PHYSICAL EXAM:      General Appearance:   Alert, cooperative, no distress   HEENT:   Normocephalic, atraumatic, anicteric      Neck:  Supple, symmetrical, trachea midline   Lungs:   Clear to auscultation bilaterally; no rales, rhonchi or wheezing; respirations unlabored    Heart[de-identified]   Regular rate and rhythm; no murmur, rub, or gallop     Abdomen:   Soft, non-tender, non-distended; normal bowel sounds; no masses, no organomegaly    Genitalia:   Deferred    Rectal:   Deferred    Extremities:  No cyanosis, clubbing or edema    Pulses:  2+ and symmetric    Skin:  No jaundice, rashes, or lesions    Lymph nodes:  No palpable cervical lymphadenopathy        Lab Results:   No visits with results within 1 Day(s) from this visit  Latest known visit with results is:   Appointment on 01/18/2022   Component Date Value    Lyme total antibody 01/18/2022 91     WBC 01/18/2022 6 17     RBC 01/18/2022 4 91     Hemoglobin 01/18/2022 16 8*    Hematocrit 01/18/2022 44 9     MCV 01/18/2022 91     MCH 01/18/2022 34 2     MCHC 01/18/2022 37 4     RDW 01/18/2022 11 4*    Platelets 56/74/4783 261     MPV 01/18/2022 10 5     Sodium 01/18/2022 134*    Potassium 01/18/2022 3 8     Chloride 01/18/2022 100     CO2 01/18/2022 21     ANION GAP 01/18/2022 13     BUN 01/18/2022 11     Creatinine 01/18/2022 0 93     Glucose, Fasting 01/18/2022 426*    Calcium 01/18/2022 9 1     AST 01/18/2022 11     ALT 01/18/2022 37     Alkaline Phosphatase 01/18/2022 80     Total Protein 01/18/2022 8 0     Albumin 01/18/2022 3 6     Total Bilirubin 01/18/2022 0 78     eGFR 01/18/2022 74     TSH 3RD GENERATON 01/18/2022 1 304          Radiology Results:   No results found

## 2022-04-12 ENCOUNTER — OFFICE VISIT (OUTPATIENT)
Dept: FAMILY MEDICINE CLINIC | Facility: CLINIC | Age: 47
End: 2022-04-12
Payer: OTHER GOVERNMENT

## 2022-04-12 VITALS
TEMPERATURE: 97.3 F | SYSTOLIC BLOOD PRESSURE: 110 MMHG | DIASTOLIC BLOOD PRESSURE: 68 MMHG | WEIGHT: 154 LBS | HEART RATE: 86 BPM | BODY MASS INDEX: 26.29 KG/M2 | HEIGHT: 64 IN | OXYGEN SATURATION: 94 %

## 2022-04-12 DIAGNOSIS — Z79.4 TYPE 2 DIABETES MELLITUS WITH DIABETIC POLYNEUROPATHY, WITH LONG-TERM CURRENT USE OF INSULIN (HCC): ICD-10-CM

## 2022-04-12 DIAGNOSIS — E11.42 TYPE 2 DIABETES MELLITUS WITH DIABETIC POLYNEUROPATHY, WITH LONG-TERM CURRENT USE OF INSULIN (HCC): ICD-10-CM

## 2022-04-12 DIAGNOSIS — Z12.31 ENCOUNTER FOR SCREENING MAMMOGRAM FOR BREAST CANCER: ICD-10-CM

## 2022-04-12 DIAGNOSIS — G43.019 INTRACTABLE MIGRAINE WITHOUT AURA AND WITHOUT STATUS MIGRAINOSUS: ICD-10-CM

## 2022-04-12 DIAGNOSIS — Z12.4 CERVICAL CANCER SCREENING: ICD-10-CM

## 2022-04-12 DIAGNOSIS — Z00.00 ANNUAL PHYSICAL EXAM: Primary | ICD-10-CM

## 2022-04-12 DIAGNOSIS — S89.91XA RIGHT KNEE INJURY, INITIAL ENCOUNTER: ICD-10-CM

## 2022-04-12 LAB — SL AMB POCT HEMOGLOBIN AIC: 9.5 (ref ?–6.5)

## 2022-04-12 PROCEDURE — 83036 HEMOGLOBIN GLYCOSYLATED A1C: CPT | Performed by: FAMILY MEDICINE

## 2022-04-12 PROCEDURE — 99396 PREV VISIT EST AGE 40-64: CPT | Performed by: FAMILY MEDICINE

## 2022-04-12 RX ORDER — INSULIN GLARGINE 100 [IU]/ML
28 INJECTION, SOLUTION SUBCUTANEOUS
Qty: 10 ML | Refills: 0 | Status: SHIPPED | OUTPATIENT
Start: 2022-04-12 | End: 2022-04-19

## 2022-04-12 RX ORDER — TOPIRAMATE 50 MG/1
50 TABLET, FILM COATED ORAL 2 TIMES DAILY
Qty: 60 TABLET | Refills: 2 | Status: SHIPPED | OUTPATIENT
Start: 2022-04-12

## 2022-04-12 NOTE — PROGRESS NOTES
[address]     NAME: Ravenmarco a Eisenberg  AGE: 40 y o  SEX: female  : 1975     DATE: 2022     Assessment and Plan:     Problem List Items Addressed This Visit        Endocrine    Type 2 diabetes mellitus with diabetic polyneuropathy, with long-term current use of insulin (HCC) - Primary    Relevant Medications    metFORMIN (GLUCOPHAGE) 1000 MG tablet    Empagliflozin (Jardiance) 10 MG TABS    insulin glargine (Lantus) 100 units/mL subcutaneous injection    Other Relevant Orders    POCT hemoglobin A1c    Ambulatory referral to Social work care management program       Cardiovascular and Mediastinum    Intractable migraine without aura and without status migrainosus    Relevant Medications    topiramate (Topamax) 50 MG tablet      Other Visit Diagnoses     Encounter for screening mammogram for breast cancer        Relevant Orders    Mammo screening bilateral w cad    Annual physical exam        Cervical cancer screening        Relevant Orders    Ambulatory Referral to Obstetrics / Gynecology        Immunizations and preventive care screenings were discussed with patient today  Appropriate education was printed on patient's after visit summary  Counseling:  Alcohol/drug use: discussed moderation in alcohol intake, the recommendations for healthy alcohol use, and avoidance of illicit drug use  Dental Health: discussed importance of regular tooth brushing, flossing, and dental visits  Sexual health: discussed sexually transmitted diseases, partner selection, use of condoms, avoidance of unintended pregnancy, and contraceptive alternatives  · Exercise: the importance of regular exercise/physical activity was discussed  Recommend exercise 3-5 times per week for at least 30 minutes  BMI Counseling: Body mass index is 26 43 kg/m²   The BMI is above normal  Nutrition recommendations include decreasing portion sizes, encouraging healthy choices of fruits and vegetables, consuming healthier snacks, limiting drinks that contain sugar, moderation in carbohydrate intake, increasing intake of lean protein and reducing intake of cholesterol  Exercise recommendations include moderate physical activity 150 minutes/week and exercising 3-5 times per week  No pharmacotherapy was ordered  Rationale for BMI follow-up plan is due to patient being overweight or obese  Depression Screening and Follow-up Plan: Patient was screened for depression during today's encounter  They screened negative with a PHQ-2 score of 1  Return in about 4 weeks (around 5/10/2022) for f/u DM  Chief Complaint:     Chief Complaint   Patient presents with    Physical Exam    Headache     consistent headcahes  History of Present Illness:     Adult Annual Physical   Patient here for a comprehensive physical exam  The patient reports problems - as documented below  Saw neurology, MRI normal, started on Topamax  Notes that she called them and told them that her headaches had not improved  Notes that she has been doing 26 units of the Lantus nightly  Taking Metformin 1000 mg BID  Right knee pain since fall in February  States that it is not getting any better  Pain radiates into the thigh and the lower leg  Notes that there was redness and swelling after the injury but not now  Has been using icy hot and muscle rubs with mild improvement  Diet and Physical Activity  · Diet/Nutrition: well balanced diet and diabetic diet  · Exercise: no formal exercise  Depression Screening  PHQ-2/9 Depression Screening    Little interest or pleasure in doing things: 0 - not at all  Feeling down, depressed, or hopeless: 1 - several days  PHQ-2 Score: 1  PHQ-2 Interpretation: Negative depression screen       General Health  · Sleep: sleeps well     · Hearing: normal - bilateral   · Vision: goes for regular eye exams, most recent eye exam <1 year ago and wears glasses  · Dental: regular dental visits, brushes teeth twice daily and flosses teeth occasionally  /GYN Health  · Patient is: premenopausal  · Last menstrual period: 23  · Contraceptive method: not sexually active  Review of Systems:     Review of Systems   Constitutional: Positive for fatigue (at baseline)  Negative for activity change, appetite change and fever  HENT: Negative for congestion, ear pain, rhinorrhea and sore throat  Eyes: Negative for pain  Respiratory: Negative for cough and shortness of breath  Cardiovascular: Negative for chest pain and leg swelling  Gastrointestinal: Negative for abdominal pain, constipation, diarrhea, nausea and vomiting  Genitourinary: Negative for dysuria, frequency and urgency  Musculoskeletal: Positive for arthralgias (knee pain)  Skin: Negative for rash  Neurological: Negative for dizziness, light-headedness and headaches  Past Medical History:     Past Medical History:   Diagnosis Date    Asthma     Colon polyp     [facility]     Fibromyalgia     Hyperlipidemia       Past Surgical History:     Past Surgical History:   Procedure Laterality Date     SECTION      CHOLECYSTECTOMY      COLONOSCOPY N/A 10/24/2018    Procedure: COLONOSCOPY;  Surgeon: Josr Hall MD;  [facility]; Service: Gastroenterology    ESOPHAGOGASTRODUODENOSCOPY N/A 10/26/2018    Procedure: ESOPHAGOGASTRODUODENOSCOPY (EGD); Surgeon: Josr Hall MD;  [facility];   Service: Gastroenterology    TUBAL LIGATION        Social History:     Social History     Socioeconomic History    Marital status: /Civil Union     Spouse name: None    Number of children: None    Years of education: None    Highest education level: None   Occupational History    None   Tobacco Use    Smoking status: Never Smoker    Smokeless tobacco: Never Used   Vaping Use    Vaping Use: Never used Substance and Sexual Activity    Alcohol use: Yes     Comment: occasionally    Drug use: Never    Sexual activity: None   Other Topics Concern    None   Social History Narrative    None     Social Determinants of Health     Financial Resource Strain: Not on file   Food Insecurity: Not on file   Transportation Needs: Not on file   Physical Activity: Not on file   Stress: Not on file   Social Connections: Not on file   Intimate Partner Violence: Not on file   Housing Stability: Not on file      Family History:     Family History   Problem Relation Age of Onset    Diabetes Mother     No Known Problems Sister     No Known Problems Sister     No Known Problems Maternal Aunt     No Known Problems Maternal Aunt     No Known Problems Maternal Aunt     No Known Problems Maternal Aunt     No Known Problems Maternal Aunt     Cancer Maternal Aunt     Breast cancer Cousin 44    Prostate cancer Maternal Uncle     Prostate cancer Maternal Uncle     Cancer Maternal Uncle       Current Medications:     Current Outpatient Medications   Medication Sig Dispense Refill    albuterol (PROVENTIL HFA,VENTOLIN HFA) 90 mcg/act inhaler Inhale 1 puff every 4 (four) hours as needed for wheezing 6 7 g 2    amitriptyline (ELAVIL) 100 mg tablet Take 1 tablet (100 mg total) by mouth daily at bedtime 30 tablet 0    atorvastatin (LIPITOR) 40 mg tablet Take 1 tablet (40 mg total) by mouth daily at bedtime 30 tablet 2    Blood Glucose Monitoring Suppl (ONE TOUCH ULTRA 2) w/Device KIT as directed      colestipol (COLESTID) 1 g tablet Take 1 tablet (1 g total) by mouth 3 (three) times a day 90 tablet 3    dicyclomine (BENTYL) 20 mg tablet Take 1 tablet (20 mg total) by mouth every 6 (six) hours as needed (abdominal pain) 60 tablet 3    gabapentin (NEURONTIN) 300 mg capsule Take 1 capsule (300 mg total) by mouth 3 (three) times a day 90 capsule 2    glucose blood test strip TEST BLOOD SUGARS 3 TIMES DAILY      insulin glargine (Lantus) 100 units/mL subcutaneous injection Inject 28 Units under the skin daily at bedtime 10 mL 0    Lancets (ONETOUCH ULTRASOFT) lancets uncontrolled DM, hyperglycemia, hypertension, test 4 times daily, 250 02 150/month      metFORMIN (GLUCOPHAGE) 1000 MG tablet Take 1 tablet (1,000 mg total) by mouth 2 (two) times a day with meals 60 tablet 2    montelukast (SINGULAIR) 10 mg tablet Take 1 tablet (10 mg total) by mouth daily at bedtime 30 tablet 2    topiramate (Topamax) 50 MG tablet Take 1 tablet (50 mg total) by mouth 2 (two) times a day 60 tablet 2    Empagliflozin (Jardiance) 10 MG TABS Take 1 tablet (10 mg total) by mouth in the morning 90 tablet 1     No current facility-administered medications for this visit  Allergies:     No Known Allergies      Physical Exam:     /68 (BP Location: Left arm, Patient Position: Sitting, Cuff Size: Adult)   Pulse 86   Temp (!) 97 3 °F (36 3 °C)   Ht 5' 4" (1 626 m)   Wt 69 9 kg (154 lb)   SpO2 94%   BMI 26 43 kg/m²     Physical Exam  Vitals reviewed  Constitutional:       General: She is not in acute distress  Appearance: Normal appearance  HENT:      Head: Normocephalic and atraumatic  Right Ear: External ear normal       Left Ear: External ear normal       Nose: Nose normal       Mouth/Throat:      Mouth: Mucous membranes are moist    Eyes:      Extraocular Movements: Extraocular movements intact  Conjunctiva/sclera: Conjunctivae normal       Pupils: Pupils are equal, round, and reactive to light  Cardiovascular:      Rate and Rhythm: Normal rate and regular rhythm  Heart sounds: Normal heart sounds  Pulmonary:      Effort: Pulmonary effort is normal       Breath sounds: Normal breath sounds  Abdominal:      General: Bowel sounds are normal  There is no distension  Palpations: Abdomen is soft  Tenderness: There is no abdominal tenderness  Musculoskeletal:      Cervical back: Neck supple        Right lower leg: No edema  Left lower leg: No edema  Lymphadenopathy:      Cervical: No cervical adenopathy  Skin:     General: Skin is warm  Capillary Refill: Capillary refill takes less than 2 seconds  Findings: No rash  Neurological:      Mental Status: She is alert  Mental status is at baseline            Augusta Bloch, DO  [address]

## 2022-04-12 NOTE — PATIENT INSTRUCTIONS

## 2022-04-13 ENCOUNTER — PATIENT OUTREACH (OUTPATIENT)
Dept: FAMILY MEDICINE CLINIC | Facility: CLINIC | Age: 47
End: 2022-04-13

## 2022-04-13 NOTE — PROGRESS NOTES
OPCM  is responding to consult from PCP  Telephone call placed to patient and I introduced myself and explained my role  Patient informed me that she was without insurance for a while but she now has [employer]  Jeni reports that she has not been compliant with taking her insulin due to being without insurance  Jeni reports that her medications are at [facility]KE ISAACS ad she is planning on picking them up tomorrow  Jeni also informed me that she resides with her  and children  She reports having medical issues and requires assistance with her ADL's  Jeni informed me that she is receiving waiver services and her daughter is a paid caregiver  I asked her if she needed assistance with transportation and Jeni informed me that she is registered with the [employer] and transportation is not a barrier  Jeni also informed me that she has applied for food stamps benefits and has to submit financial information for her household members  She reports that she receives [employer] and has adequate food and all basic necessities  Jeni informed me that she has information on where the local food pantries are located  Jeni reports having a long history of depression and she follows with therapist, Marina Aragon  She would like information on Psychiatrists that accept her insurance and I provided her with a list of local providers  Jeni denies having any suicidal ideations or thoughts of self harm  She informed me that she has the telephone number for mobile crisis  Jeni asked me if I knew a provider for Pain Management and I provided her with the telephone number for  [facility] and Dr Anish Ngo denies needing any further assistance at this time  I advised her to contact me if I can be of any assistance or support in the future and she agrees and was thankful for my outreach

## 2022-04-18 ENCOUNTER — TELEPHONE (OUTPATIENT)
Dept: FAMILY MEDICINE CLINIC | Facility: CLINIC | Age: 47
End: 2022-04-18

## 2022-04-18 DIAGNOSIS — Z79.4 TYPE 2 DIABETES MELLITUS WITH DIABETIC POLYNEUROPATHY, WITH LONG-TERM CURRENT USE OF INSULIN (HCC): Primary | ICD-10-CM

## 2022-04-18 DIAGNOSIS — E11.42 TYPE 2 DIABETES MELLITUS WITH DIABETIC POLYNEUROPATHY, WITH LONG-TERM CURRENT USE OF INSULIN (HCC): Primary | ICD-10-CM

## 2022-04-18 NOTE — TELEPHONE ENCOUNTER
T/c from pt  - pt says that [employer] no[employer]ola is sending Dr Grupo Steel a form to fill out to keep electric on - I informed the pt that we do not get faxes same day, as soon as we receive it goes to Dr Grupo Steel for review  I specifically stressed that it is for review   Pt  said they have three days to complete the process, I informed him we cannot guarantee a timeline

## 2022-04-19 RX ORDER — INSULIN GLARGINE 100 [IU]/ML
28 INJECTION, SOLUTION SUBCUTANEOUS
Qty: 15 ML | Refills: 2 | Status: SHIPPED | OUTPATIENT
Start: 2022-04-19

## 2022-04-19 NOTE — TELEPHONE ENCOUNTER
Pt walked in our office with multiple requests :     - expected fax was send to incorrect fax # [phone] - I reviewed [facility] bin but fax not received yet - I  gave pt correct practice fax # and he will call to [employer] to resend ppwrk  Please check in incoming faxes  - Pt is requesting a refill of Lantuss Pen (5 in a pack) with additional refills  - pt uses pens and we did send script for the vial in error  Please resent to [facility]  [place]  - and finally -   Pts drug jardiance requires prior authorization - as per Pharmacist - pt usually does the insulin and metformin, this is a new script for pt  Proceed with auth, or would you like to select a different medication? awaiting doctor's decision        Please advise    #[phone] or # [phone]

## 2022-04-19 NOTE — TELEPHONE ENCOUNTER
Yes, please do prior auth  You schwartz sent       Brittaney Hughes DO  [facility]  4/19/2022 4:16 PM

## 2022-04-20 NOTE — TELEPHONE ENCOUNTER
Pt called - reporting she received a message notification from her pharmacy  about some meds being ordered  Spoke to pt - fax not received yet as of 1:01 PM  Pt will call them again

## 2022-04-20 NOTE — TELEPHONE ENCOUNTER
Upon checking CoverMyMeds to begin prior auth, it states that no PA is required for this medication as this is a Tier 1 covered medication  PA submitted anyway     Your PA has been faxed to the plan as a paper copy  Please contact the plan directly if you haven't received a determination in a typical timeframe  You will be notified of the determination via fax        PA Key:  [id number]

## 2022-04-20 NOTE — TELEPHONE ENCOUNTER
Pt notified that Lantus pen sent  Patient will be heading to pharmacy to  Rx  Understands fully  Prior authorization request routed to clinical      Pulling faxes in [place], will update

## 2022-04-21 ENCOUNTER — HOSPITAL ENCOUNTER (OUTPATIENT)
Dept: RADIOLOGY | Facility: HOSPITAL | Age: 47
Discharge: HOME | End: 2022-04-21
Payer: OTHER GOVERNMENT

## 2022-04-21 DIAGNOSIS — S89.91XA RIGHT KNEE INJURY, INITIAL ENCOUNTER: ICD-10-CM

## 2022-04-21 PROCEDURE — 73564 X-RAY EXAM KNEE 4 OR MORE: CPT

## 2022-04-22 ENCOUNTER — TELEPHONE (OUTPATIENT)
Dept: FAMILY MEDICINE CLINIC | Facility: CLINIC | Age: 47
End: 2022-04-22

## 2022-04-22 NOTE — TELEPHONE ENCOUNTER
Pt has received that Lantus pens and has been notified that we are waiting on a determination of coverage for her Jardiance

## 2022-04-22 NOTE — TELEPHONE ENCOUNTER
Prior Authorization [id number]  Thank you for using our online submission PA form  You will be notified of our decision via fax and/or mail  Please contact ZEALER [phone] FIB inquiries regarding your request       PA submitted for Jardiance through [employer]

## 2022-04-22 NOTE — TELEPHONE ENCOUNTER
----- Message from Jacklyn Macedo sent at 4/22/2022  8:47 AM EDT -----  Regarding: Diabetic medicine   Yes in order to use the bottle of insulin  I also received a message from Lian Gomez that the prescription needed for approval from you and the insurance was denied for Марина  Please call me with a update at [phone]

## 2022-04-25 ENCOUNTER — TELEPHONE (OUTPATIENT)
Dept: FAMILY MEDICINE CLINIC | Facility: CLINIC | Age: 47
End: 2022-04-25

## 2022-04-25 DIAGNOSIS — M25.561 CHRONIC PAIN OF RIGHT KNEE: Primary | ICD-10-CM

## 2022-04-25 DIAGNOSIS — G89.29 CHRONIC PAIN OF RIGHT KNEE: Primary | ICD-10-CM

## 2022-04-25 NOTE — TELEPHONE ENCOUNTER
Pt came to office -- saw her xray result on mychart and knows that there is nothing broken  Is requesting a referral to PT to help with the pain

## 2022-04-25 NOTE — TELEPHONE ENCOUNTER
Pt came to office to check on status of xray taken 4/21 and also of pa for akila  Emailed radiology, as xray was not read yet and also checked media  Found letter of approval for PA    Printed copy for pt

## 2022-05-18 ENCOUNTER — TELEPHONE (OUTPATIENT)
Dept: NEUROLOGY | Facility: CLINIC | Age: 47
End: 2022-05-18

## 2022-06-06 ENCOUNTER — OFFICE VISIT (OUTPATIENT)
Dept: FAMILY MEDICINE CLINIC | Facility: CLINIC | Age: 47
End: 2022-06-06
Payer: OTHER GOVERNMENT

## 2022-06-06 VITALS
SYSTOLIC BLOOD PRESSURE: 112 MMHG | BODY MASS INDEX: 26.46 KG/M2 | OXYGEN SATURATION: 99 % | HEIGHT: 64 IN | TEMPERATURE: 98.3 F | HEART RATE: 78 BPM | DIASTOLIC BLOOD PRESSURE: 62 MMHG | WEIGHT: 155 LBS

## 2022-06-06 DIAGNOSIS — Z79.4 TYPE 2 DIABETES MELLITUS WITH DIABETIC POLYNEUROPATHY, WITH LONG-TERM CURRENT USE OF INSULIN (HCC): Primary | ICD-10-CM

## 2022-06-06 DIAGNOSIS — E11.42 TYPE 2 DIABETES MELLITUS WITH DIABETIC POLYNEUROPATHY, WITH LONG-TERM CURRENT USE OF INSULIN (HCC): Primary | ICD-10-CM

## 2022-06-06 PROCEDURE — 92250 FUNDUS PHOTOGRAPHY W/I&R: CPT | Performed by: FAMILY MEDICINE

## 2022-06-06 PROCEDURE — 99214 OFFICE O/P EST MOD 30 MIN: CPT | Performed by: FAMILY MEDICINE

## 2022-06-06 NOTE — PROGRESS NOTES
Assessment/Plan:    No problem-specific Assessment & Plan notes found for this encounter  Diagnoses and all orders for this visit:    Type 2 diabetes mellitus with diabetic polyneuropathy, with long-term current use of insulin (HCC)  Increase Jardiance to 25 mg daily  Discussed this importance of DM control in terms of decreasing neuropathy symptoms    -     Empagliflozin 25 MG TABS; Take 1 tablet (25 mg total) by mouth every morning  -     IRIS Diabetic eye exam        Subjective:      Patient ID: Radha Zavala is a 56 y o  female  HPI    Notes that her right leg it still bothersome  This occurred after a fall in February, XR normal, has not started PT  Notes that she has a lot of discomfort in the knee, states that going up the stairs is difficult  Finds that the knee is painful at rest but more so with movement  She has taken Aleeve, notes that this works for about 20-30 minutes and then the pain returns  Notes that her blood sugar has improved a little bit, 190 seems to be her average morning glucose  Doing 30 units nightly of the Lantus  Taking 10 of the Jardiance, tolerating well without issues  The following portions of the patient's history were reviewed and updated as appropriate: allergies, current medications, past family history, past medical history, past social history, past surgical history and problem list     Review of Systems      Objective:  /62   Pulse 78   Temp 98 3 °F (36 8 °C)   Ht 5' 4" (1 626 m)   Wt 70 3 kg (155 lb)   SpO2 99%   BMI 26 61 kg/m²      Physical Exam  Vitals reviewed  Constitutional:       General: She is not in acute distress  Appearance: Normal appearance  HENT:      Head: Normocephalic and atraumatic  Right Ear: External ear normal       Left Ear: External ear normal       Nose: Nose normal       Mouth/Throat:      Mouth: Mucous membranes are moist    Eyes:      Extraocular Movements: Extraocular movements intact  Conjunctiva/sclera: Conjunctivae normal    Cardiovascular:      Rate and Rhythm: Normal rate and regular rhythm  Heart sounds: Normal heart sounds  Pulmonary:      Effort: Pulmonary effort is normal       Breath sounds: Normal breath sounds  No wheezing, rhonchi or rales  Abdominal:      General: Bowel sounds are normal  There is no distension  Palpations: Abdomen is soft  Tenderness: There is no abdominal tenderness  Musculoskeletal:      Right lower leg: No edema  Left lower leg: No edema  Skin:     General: Skin is warm  Capillary Refill: Capillary refill takes less than 2 seconds  Findings: No rash  Neurological:      Mental Status: She is alert  Mental status is at baseline           Bibiana Sanchez DO  [facility]  6/10/2022 12:52 PM

## 2022-06-09 LAB
LEFT EYE DIABETIC RETINOPATHY: NORMAL
LEFT EYE IMAGE QUALITY: NORMAL
LEFT EYE MACULAR EDEMA: NORMAL
LEFT EYE OTHER RETINOPATHY: NORMAL
RIGHT EYE DIABETIC RETINOPATHY: NORMAL
RIGHT EYE IMAGE QUALITY: NORMAL
RIGHT EYE MACULAR EDEMA: NORMAL
RIGHT EYE OTHER RETINOPATHY: NORMAL
SEVERITY (EYE EXAM): NORMAL

## 2022-06-15 ENCOUNTER — CONSULT (OUTPATIENT)
Dept: BARIATRICS | Facility: CLINIC | Age: 47
End: 2022-06-15
Payer: COMMERCIAL

## 2022-06-15 VITALS
WEIGHT: 152.9 LBS | HEART RATE: 86 BPM | RESPIRATION RATE: 12 BRPM | TEMPERATURE: 97.9 F | BODY MASS INDEX: 26.1 KG/M2 | DIASTOLIC BLOOD PRESSURE: 70 MMHG | SYSTOLIC BLOOD PRESSURE: 108 MMHG | HEIGHT: 64 IN

## 2022-06-15 DIAGNOSIS — Z79.4 TYPE 2 DIABETES MELLITUS WITH DIABETIC POLYNEUROPATHY, WITH LONG-TERM CURRENT USE OF INSULIN (HCC): ICD-10-CM

## 2022-06-15 DIAGNOSIS — E11.42 TYPE 2 DIABETES MELLITUS WITH DIABETIC POLYNEUROPATHY, WITH LONG-TERM CURRENT USE OF INSULIN (HCC): ICD-10-CM

## 2022-06-15 DIAGNOSIS — R63.5 ABNORMAL WEIGHT GAIN: ICD-10-CM

## 2022-06-15 DIAGNOSIS — F41.8 DEPRESSION WITH ANXIETY: ICD-10-CM

## 2022-06-15 DIAGNOSIS — G43.019 INTRACTABLE MIGRAINE WITHOUT AURA AND WITHOUT STATUS MIGRAINOSUS: ICD-10-CM

## 2022-06-15 DIAGNOSIS — E78.2 MIXED HYPERLIPIDEMIA: ICD-10-CM

## 2022-06-15 DIAGNOSIS — E66.3 OVERWEIGHT: Primary | ICD-10-CM

## 2022-06-15 PROCEDURE — 99243 OFF/OP CNSLTJ NEW/EST LOW 30: CPT | Performed by: INTERNAL MEDICINE

## 2022-06-15 NOTE — PROGRESS NOTES
Assessment/Plan:  Jeni was seen today for consult  Diagnoses and all orders for this visit:    Mixed hyperlipidemia  Increase activity level to 150 minutes of moderate to intense exercise per week  Weight loss should help improve the lipid levels  Avoid foods with trans fat, limit saturated fats and refined carbohydrates  Increase fish/omega 3 consumption    Type 2 diabetes mellitus with diabetic polyneuropathy, with long-term current use of insulin (HCC)  A1C:   Lab Results   Component Value Date    HGBA1C 9 5 (A) 04/12/2022    HGBA1C 9 2 (H) 06/14/2018   Currently on: metformin, 30U lantus, jardiance   Would avoid glp-1 for now due to hyperTG  Weight loss/low carbohydrate diet should help improve blood glucose levels    Intractable migraine without aura and without status migrainosus  On topamax    Depression with anxiety  Consider wellbutrin   seeing psychology    Overweight  Abnormal weight gain  - Discussed options of HealthyCORE-Intensive Lifestyle Intervention Program, Very Low Calorie Diet-VLCD, and Conservative Program and the role of weight loss medications  - Explained the importance of making lifestyle changes first before starting any anti-obesity medications  Patient should demonstrate lifestyle changes first before anti-obesity medication can be initiated  - Patient is interested in pursuing Conservative Program  - Initial weight loss goal of 5-10% weight loss for improved health  - Weight loss can improve patient's co-morbid conditions and/or prevent weight-related complications  Stop drinking soda  Start exercising  4-6 small meals+snacks/day    Goals:  Do not skip any meals! Food log (ie ) www myfitnesspal com,sparkpeople  com,loseit com,calorieking  com,etc  baritastic (use skinnytaste  com, dietdoctor  com or smartphone cielo "[employer] for recipes)  No sugary beverages  At least 64oz of water daily  Increase physical activity by 10 minutes daily   Gradually increase physical activity to a goal of 5 days per week for 30 minutes of MODERATE intensity PLUS 2 days per week of FULL BODY resistance training (use smartphone apps FitON, Home Workout, etc )  Goal protein 80g per day  Goal carbohydrates 80g per day   1125-4721 calories     Total time spent: 30 min, with >50% face-to-face time spent counseling patient on diet behavior and exercise modification for weight loss  Follow up in approximately 3 months with Non-Surgical Physician/Advanced Practitioner  Subjective:   Chief Complaint   Patient presents with    Consult     Initial visit with jaylene       Patient ID: Garrett Castro  is a 56 y o  female with excess weight/obesity here to pursue weight management  Previous notes and records have been reviewed  Past Medical History:   Diagnosis Date    Asthma     Colon polyp     Diabetes mellitus ([address] )     Fibromyalgia     Hyperlipidemia      Past Surgical History:   Procedure Laterality Date     SECTION      CHOLECYSTECTOMY      COLONOSCOPY N/A 10/24/2018    Procedure: COLONOSCOPY;  Surgeon: Medardo Castro MD;  Location: [facility]; Service: Gastroenterology    ESOPHAGOGASTRODUODENOSCOPY N/A 10/26/2018    Procedure: ESOPHAGOGASTRODUODENOSCOPY (EGD); Surgeon: Medardo Castro MD;  Location: [facility];   Service: Gastroenterology    TUBAL LIGATION         HPI:  Wt Readings from Last 20 Encounters:   06/15/22 69 4 kg (152 lb 14 4 oz)   22 70 3 kg (155 lb)   22 69 9 kg (154 lb)   22 72 1 kg (159 lb)   22 72 1 kg (159 lb)   22 72 3 kg (159 lb 6 4 oz)   22 72 1 kg (159 lb)   12/15/21 72 7 kg (160 lb 3 2 oz)   21 72 5 kg (159 lb 12 8 oz)   10/18/21 71 7 kg (158 lb)   21 75 3 kg (166 lb)   21 74 8 kg (165 lb)   21 75 3 kg (166 lb)   21 72 1 kg (159 lb)   21 72 1 kg (159 lb)   21 68 8 kg (151 lb 9 6 oz)   21 68 9 kg (152 lb)   21 68 kg (150 lb)   21 69 9 kg (154 lb)   04/15/21 70 kg (154 lb 6 4 oz)       Severity: Mild  Onset:  Over the years     Diagnosed with T2DM for 8 years  Did not take any meds while she did not have insurance for 3 years  Gluc ranging around 200-400s   Modifiers: Diet and Exercise, behavioral changes  Contributing factors: Poor Food Choices and Insufficient Physical Activity  Associated symptoms: comorbid conditions  Goal weight loss: 5-10% STG  +stress eating  Going through a divorce  3 kids - 16, 24, 24  B- skips   S-  L- sandwich   S-  D- salad with chicken   S-  Does not eat out much    Hydration: 2 glasses water, reg soda 3 cups   Alcohol: no   Smoking: no  Exercise: walks her dogs   Occupation: n/a  Sleep: interrupted   STOP ban/8    The following portions of the patient's history were reviewed and updated as appropriate: allergies, current medications, past family history, past medical history, past social history, past surgical history, and problem list     Review of Systems   Constitutional: Negative for appetite change, chills and fever  HENT: Negative for rhinorrhea and sore throat  Respiratory: Negative for cough, chest tightness and shortness of breath  Cardiovascular: Negative for chest pain and leg swelling  Gastrointestinal: Negative for abdominal pain, constipation, diarrhea, nausea and vomiting  Endocrine: Negative for cold intolerance and heat intolerance  Genitourinary: Negative for difficulty urinating  Musculoskeletal: Negative for arthralgias  Skin: Negative for color change  Neurological: Negative for dizziness, numbness and headaches  Psychiatric/Behavioral: Negative for sleep disturbance  The patient is nervous/anxious  +stress   All other systems reviewed and are negative        Objective:  /70 (BP Location: Right arm, Patient Position: Sitting, Cuff Size: Standard)   Pulse 86   Temp 97 9 °F (36 6 °C) (Tympanic)   Resp 12   Ht 5' 4" (1 626 m)   Wt 69 4 kg (152 lb 14 4 oz)   BMI 26 25 kg/m²   Constitutional: Well-developed, well-nourished and obese Body mass index is 26 25 kg/m²  Ascencion Short HEENT: No conjunctival pallor or jaundice  Pulmonary: No increased work of breathing or signs of respiratory distress  CV: Normal rate, well-perfused  GI: Obese  Non-distended  MSK: No edema   Neuro: Oriented to person, place and time  Normal Speech  Normal gait  Psych: Normal affect and mood  Labs and Imaging  Recent labs and imaging have been personally reviewed  Lab Results   Component Value Date    WBC 6 17 01/18/2022    HGB 16 8 (H) 01/18/2022    HCT 44 9 01/18/2022    MCV 91 01/18/2022     01/18/2022     Lab Results   Component Value Date    SODIUM 134 (L) 01/18/2022    K 3 8 01/18/2022     01/18/2022    CO2 21 01/18/2022    AGAP 13 01/18/2022    BUN 11 01/18/2022    CREATININE 0 93 01/18/2022    GLUC 320 (H) 11/23/2020    GLUF 426 (H) 01/18/2022    CALCIUM 9 1 01/18/2022    AST 11 01/18/2022    ALT 37 01/18/2022    ALKPHOS 80 01/18/2022    TP 8 0 01/18/2022    TBILI 0 78 01/18/2022    EGFR 74 01/18/2022     Lab Results   Component Value Date    HGBA1C 9 5 (A) 04/12/2022     Lab Results   Component Value Date    KQP8IAJNGZDC 1 304 01/18/2022     Lab Results   Component Value Date    CHOLESTEROL 203 (H) 02/23/2021     Lab Results   Component Value Date    HDL 37 (L) 02/23/2021     Lab Results   Component Value Date    TRIG 540 (H) 02/23/2021     Lab Results   Component Value Date    [facility]  02/23/2021      Comment:      Calculated LDL invalid, triglycerides >400 mg/dl  This screening LDL is a calculated result  It does not have the accuracy of the Direct Measured LDL in the monitoring of patients with hyperlipidemia and/or statin therapy  Direct Measure LDL (JUA438) must be ordered separately in these patients

## 2022-06-15 NOTE — PATIENT INSTRUCTIONS
Goals:  Do not skip any meals! Food log (ie ) www myfitnesspal com,sparkpeople  com,loseit com,calorieking  com,etc  baritastic (use skinnytaste  com, dietdoctor  com or smartphone cielo [employer] for recipes)  No sugary beverages  At least 64oz of water daily  Increase physical activity by 10 minutes daily   Gradually increase physical activity to a goal of 5 days per week for 30 minutes of MODERATE intensity PLUS 2 days per week of FULL BODY resistance training (use smartphone apps [employer], Home Workout, etc )  Goal protein 80g per day  Goal carbohydrates 80g per day   2805-3351 calories   Stop drinking soda  Start exercising  4-6 small meals+snacks/day    Try these alternative food options:  Protein shakes- Premier, Pure protein, Fairlife, Iconic  Lactose free protein shakes-  Fairlife, Ripple, Iconic, Andrade  Protein bars- Quest, Pure protein  Ice cream- Andrés's, Yasso, Enlightened, Halo Top   Chips- Quest protein chips, Cheese crisps   Bread- 647 wheat, Protein Keto bread, whole wheat deborah bread, low carb/high protein wraps  Pasta- Chickpea pasta, Pasta zero or similar  Rice- Cauliflower rice, quinoa, wild rice, brown rice  Fruits- berries, cantaloupe, peaches, apples, oranges  Yogurt- Ratio (25g protein), Skyr, Two good, Chobani 60 lisa or 100 lisa, Oikos triple zero  Sugar alternatives- Stevia, Monk fruit, Swerve

## 2022-08-17 ENCOUNTER — TELEPHONE (OUTPATIENT)
Dept: FAMILY MEDICINE CLINIC | Facility: CLINIC | Age: 47
End: 2022-08-17

## 2022-08-17 NOTE — TELEPHONE ENCOUNTER
Left voicemail for patient  Patients appointment will need to be cancelled due to her insurance  Brenda Salinas does not participate with it  She will need to call her insurance to see who she can see

## 2022-08-17 NOTE — TELEPHONE ENCOUNTER
----- Message from Vira Collazo sent at 8/16/2022  9:47 AM EDT -----  Regarding: FW: [address] [place] [address]  Please call pt to cancel appt  Advise to call insurance and ask for a list of covered providers in her area  ----- Message -----  From: Elvis Russell MA  Sent: 8/12/2022   7:33 AM EDT  To: Vira Collazo, [place]  Subject: [address] [place] [address]                                             Patient was self pay and now has [address] [place] [address], unfortunately she will have to be referred out  She is in the schedule for 8/26  Please notify patient

## 2022-09-07 ENCOUNTER — OFFICE VISIT (OUTPATIENT)
Dept: FAMILY MEDICINE CLINIC | Facility: CLINIC | Age: 47
End: 2022-09-07
Payer: OTHER GOVERNMENT

## 2022-09-07 VITALS
OXYGEN SATURATION: 98 % | BODY MASS INDEX: 25.95 KG/M2 | SYSTOLIC BLOOD PRESSURE: 124 MMHG | TEMPERATURE: 98.4 F | HEART RATE: 94 BPM | HEIGHT: 64 IN | WEIGHT: 152 LBS | DIASTOLIC BLOOD PRESSURE: 68 MMHG

## 2022-09-07 DIAGNOSIS — E11.65 TYPE 2 DIABETES MELLITUS WITH HYPERGLYCEMIA, WITH LONG-TERM CURRENT USE OF INSULIN (HCC): Primary | ICD-10-CM

## 2022-09-07 DIAGNOSIS — J30.2 SEASONAL ALLERGIES: ICD-10-CM

## 2022-09-07 DIAGNOSIS — M79.7 FIBROMYALGIA: ICD-10-CM

## 2022-09-07 DIAGNOSIS — J45.40 MODERATE PERSISTENT ASTHMA WITHOUT COMPLICATION: ICD-10-CM

## 2022-09-07 DIAGNOSIS — Z79.4 TYPE 2 DIABETES MELLITUS WITH DIABETIC POLYNEUROPATHY, WITH LONG-TERM CURRENT USE OF INSULIN (HCC): ICD-10-CM

## 2022-09-07 DIAGNOSIS — E11.42 TYPE 2 DIABETES MELLITUS WITH DIABETIC POLYNEUROPATHY, WITH LONG-TERM CURRENT USE OF INSULIN (HCC): ICD-10-CM

## 2022-09-07 DIAGNOSIS — Z79.4 TYPE 2 DIABETES MELLITUS WITH HYPERGLYCEMIA, WITH LONG-TERM CURRENT USE OF INSULIN (HCC): Primary | ICD-10-CM

## 2022-09-07 DIAGNOSIS — G43.019 INTRACTABLE MIGRAINE WITHOUT AURA AND WITHOUT STATUS MIGRAINOSUS: ICD-10-CM

## 2022-09-07 LAB — SL AMB POCT HEMOGLOBIN AIC: 9.4 (ref ?–6.5)

## 2022-09-07 PROCEDURE — 99214 OFFICE O/P EST MOD 30 MIN: CPT | Performed by: FAMILY MEDICINE

## 2022-09-07 PROCEDURE — 83036 HEMOGLOBIN GLYCOSYLATED A1C: CPT | Performed by: FAMILY MEDICINE

## 2022-09-07 RX ORDER — TOPIRAMATE 50 MG/1
50 TABLET, FILM COATED ORAL 2 TIMES DAILY
Qty: 60 TABLET | Refills: 2 | Status: SHIPPED | OUTPATIENT
Start: 2022-09-07

## 2022-09-07 RX ORDER — GABAPENTIN 300 MG/1
300 CAPSULE ORAL 3 TIMES DAILY
Qty: 90 CAPSULE | Refills: 2 | Status: SHIPPED | OUTPATIENT
Start: 2022-09-07

## 2022-09-07 RX ORDER — ALBUTEROL SULFATE 90 UG/1
1 AEROSOL, METERED RESPIRATORY (INHALATION) EVERY 4 HOURS PRN
Qty: 6.7 G | Refills: 2 | Status: SHIPPED | OUTPATIENT
Start: 2022-09-07

## 2022-09-07 RX ORDER — ATORVASTATIN CALCIUM 40 MG/1
40 TABLET, FILM COATED ORAL
Qty: 30 TABLET | Refills: 2 | Status: SHIPPED | OUTPATIENT
Start: 2022-09-07

## 2022-09-07 RX ORDER — MONTELUKAST SODIUM 10 MG/1
10 TABLET ORAL
Qty: 30 TABLET | Refills: 2 | Status: SHIPPED | OUTPATIENT
Start: 2022-09-07

## 2022-09-07 RX ORDER — INSULIN GLARGINE 100 [IU]/ML
32 INJECTION, SOLUTION SUBCUTANEOUS
Qty: 15 ML | Refills: 2 | Status: SHIPPED | OUTPATIENT
Start: 2022-09-07 | End: 2022-09-07

## 2022-09-07 RX ORDER — INSULIN GLARGINE 100 [IU]/ML
35 INJECTION, SOLUTION SUBCUTANEOUS
Qty: 15 ML | Refills: 2 | Status: SHIPPED | OUTPATIENT
Start: 2022-09-07 | End: 2022-09-15 | Stop reason: SDUPTHER

## 2022-09-07 RX ORDER — AMITRIPTYLINE HYDROCHLORIDE 100 MG/1
100 TABLET, FILM COATED ORAL
Qty: 90 TABLET | Refills: 0 | Status: SHIPPED | OUTPATIENT
Start: 2022-09-07

## 2022-09-07 NOTE — PROGRESS NOTES
Assessment/Plan:    No problem-specific Assessment & Plan notes found for this encounter  Diagnoses and all orders for this visit:    Type 2 diabetes mellitus with hyperglycemia, with long-term current use of insulin ([facility])  -     POCT hemoglobin A1c  -     atorvastatin (LIPITOR) 40 mg tablet; Take 1 tablet (40 mg total) by mouth daily at bedtime    Moderate persistent asthma without complication  -     albuterol (PROVENTIL HFA,VENTOLIN HFA) 90 mcg/act inhaler; Inhale 1 puff every 4 (four) hours as needed for wheezing  -     montelukast (SINGULAIR) 10 mg tablet; Take 1 tablet (10 mg total) by mouth daily at bedtime    Fibromyalgia  -     amitriptyline (ELAVIL) 100 mg tablet; Take 1 tablet (100 mg total) by mouth daily at bedtime  -     gabapentin (NEURONTIN) 300 mg capsule; Take 1 capsule (300 mg total) by mouth 3 (three) times a day    Type 2 diabetes mellitus with diabetic polyneuropathy, with long-term current use of insulin ([facility])  -     Empagliflozin 25 MG TABS; Take 1 tablet (25 mg total) by mouth every morning  -     gabapentin (NEURONTIN) 300 mg capsule; Take 1 capsule (300 mg total) by mouth 3 (three) times a day  -     Discontinue: Insulin Glargine Solostar (Lantus SoloStar) 100 UNIT/ML SOPN; Inject 0 32 mL (32 Units total) under the skin daily at bedtime  -     metFORMIN (GLUCOPHAGE) 1000 MG tablet; Take 1 tablet (1,000 mg total) by mouth 2 (two) times a day with meals  -     Insulin Glargine Solostar (Lantus SoloStar) 100 UNIT/ML SOPN; Inject 0 35 mL (35 Units total) under the skin daily at bedtime    Seasonal allergies  -     montelukast (SINGULAIR) 10 mg tablet; Take 1 tablet (10 mg total) by mouth daily at bedtime    Intractable migraine without aura and without status migrainosus  -     topiramate (Topamax) 50 MG tablet;  Take 1 tablet (50 mg total) by mouth 2 (two) times a day    Medications refilled, will increase Lantus to 35 units nightly, if fasting AM glucose is elevated above 180, patient will increase to 37 units  If this is still elevated about 180, she will increase to 40 units nightly  Avoiding GLP-1 at this time due to hypertriglyceridemia, will also above sulfonylureas to avoid hypoglycemia while on insulin  Subjective:      Patient ID: Yordy Perez is a 56 y o  female  HPI     Patient presents to the office for DM follow up  States that she is doing well with no changes in her baseline level of health  Checking sugar AM fasting, high 200s  Notes that she is taking 32 units of her Lantus nightly  She is mindful of her diet but she also has IBS so she sometimes has to "eat what she can" because its hard to mix both of the diets  States that she is taking her Metformin 1000 mg BID, Empagliflozin 25 mg daily without issue  The following portions of the patient's history were reviewed and updated as appropriate: allergies, current medications, past family history, past medical history, past social history, past surgical history and problem list     Review of Systems      Objective:  /68 (BP Location: Left arm, Patient Position: Sitting, Cuff Size: Adult)   Pulse 94   Temp 98 4 °F (36 9 °C)   Ht 5' 4" (1 626 m)   Wt 68 9 kg (152 lb)   SpO2 98%   BMI 26 09 kg/m²      Physical Exam  Vitals reviewed  Constitutional:       General: She is not in acute distress  Appearance: Normal appearance  HENT:      Head: Normocephalic and atraumatic  Right Ear: External ear normal       Left Ear: External ear normal       Mouth/Throat:      Mouth: Mucous membranes are moist    Eyes:      Extraocular Movements: Extraocular movements intact  Conjunctiva/sclera: Conjunctivae normal    Cardiovascular:      Rate and Rhythm: Normal rate and regular rhythm  Pulses: no weak pulses          Dorsalis pedis pulses are 2+ on the right side and 2+ on the left side  Posterior tibial pulses are 2+ on the right side and 2+ on the left side        Heart sounds: Normal heart sounds  Pulmonary:      Effort: Pulmonary effort is normal       Breath sounds: Normal breath sounds  No wheezing, rhonchi or rales  Musculoskeletal:      Right lower leg: No edema  Left lower leg: No edema  Feet:    Feet:      Right foot:      Skin integrity: No ulcer, skin breakdown, erythema, warmth, callus or dry skin  Left foot:      Skin integrity: No ulcer, skin breakdown, erythema, warmth, callus or dry skin  Skin:     General: Skin is warm  Capillary Refill: Capillary refill takes less than 2 seconds  Neurological:      Mental Status: She is alert  Mental status is at baseline  Patient's shoes and socks removed  Right Foot/Ankle   Right Foot Inspection  Skin Exam: skin normal and skin intact  No dry skin, no warmth, no callus, no erythema, no maceration, no abnormal color, no pre-ulcer, no ulcer and no callus  Toe Exam: ROM and strength within normal limits  Sensory   Vibration: diminished  Proprioception: intact  Monofilament testing: diminished    Vascular  Capillary refills: < 3 seconds  The right DP pulse is 2+  The right PT pulse is 2+  Left Foot/Ankle  Left Foot Inspection  Skin Exam: skin normal and skin intact  No dry skin, no warmth, no erythema, no maceration, normal color, no pre-ulcer, no ulcer and no callus  Toe Exam: ROM and strength within normal limits  Sensory   Vibration: diminished  Proprioception: intact  Monofilament testing: diminished    Vascular  Capillary refills: < 3 seconds  The left DP pulse is 2+  The left PT pulse is 2+       Assign Risk Category  No deformity present  Loss of protective sensation  No weak pulses  Risk: [address],   [address] [facility]  9/7/2022 10:23 AM

## 2022-09-09 ENCOUNTER — TELEPHONE (OUTPATIENT)
Dept: FAMILY MEDICINE CLINIC | Facility: CLINIC | Age: 47
End: 2022-09-09

## 2022-09-09 DIAGNOSIS — R21 RASH: Primary | ICD-10-CM

## 2022-09-09 NOTE — TELEPHONE ENCOUNTER
Yes, this was just a refill of an insulin she has been on      Kamleshbrijesh Cuellar DO  [facility]  9/9/2022 8:41 AM

## 2022-09-15 DIAGNOSIS — E11.42 TYPE 2 DIABETES MELLITUS WITH DIABETIC POLYNEUROPATHY, WITH LONG-TERM CURRENT USE OF INSULIN (HCC): ICD-10-CM

## 2022-09-15 DIAGNOSIS — Z79.4 TYPE 2 DIABETES MELLITUS WITH DIABETIC POLYNEUROPATHY, WITH LONG-TERM CURRENT USE OF INSULIN (HCC): ICD-10-CM

## 2022-09-15 RX ORDER — INSULIN GLARGINE 100 [IU]/ML
35 INJECTION, SOLUTION SUBCUTANEOUS
Qty: 15 ML | Refills: 2 | Status: SHIPPED | OUTPATIENT
Start: 2022-09-15

## 2022-09-15 NOTE — TELEPHONE ENCOUNTER
Dr Anu Murdock,    Medication was denied because she has not used one of the preferred drugs by her insurance  Preferred drugs are Lantus solo star brand name preferable and Levemir felxtouch or vial     Please advise    Laurie David/iam  09/15/22  8:41 AM

## 2022-09-16 NOTE — TELEPHONE ENCOUNTER
Spoke with pt, she picked up her Lantus from the pharmacy yesterday  Pt c/o itchy and dry rash in her right hand and by her right rib, she had the rash for a few weeks  Per Dr Eric Maloney advise  Pt can use Hydrocortisone cream  Prescription sent to the pharmacy and pt is aware       Laurie Phillips  09/16/22  4:32 PM

## 2022-09-16 NOTE — TELEPHONE ENCOUNTER
Received letter that Insulin was denied  Need medical documentation of 2 failed preferred medications      Please advise

## 2022-09-16 NOTE — TELEPHONE ENCOUNTER
Lantus solostar is a preferred drug per the list, this is what was prescribed  I do not know how so get this remedy  Can we address with the pharmacy that the requested medication from the denial is the medication that was prescribed?     Nimisha Pacheco DO  [facility]  9/16/2022 4:21 PM

## 2022-09-26 ENCOUNTER — OFFICE VISIT (OUTPATIENT)
Dept: GASTROENTEROLOGY | Facility: CLINIC | Age: 47
End: 2022-09-26
Payer: COMMERCIAL

## 2022-09-26 VITALS
HEIGHT: 64 IN | WEIGHT: 158 LBS | HEART RATE: 99 BPM | BODY MASS INDEX: 26.98 KG/M2 | OXYGEN SATURATION: 99 % | DIASTOLIC BLOOD PRESSURE: 72 MMHG | SYSTOLIC BLOOD PRESSURE: 110 MMHG

## 2022-09-26 DIAGNOSIS — R19.7 DIARRHEA, UNSPECIFIED TYPE: ICD-10-CM

## 2022-09-26 DIAGNOSIS — R15.9 FULL INCONTINENCE OF FECES: Primary | ICD-10-CM

## 2022-09-26 PROCEDURE — 99213 OFFICE O/P EST LOW 20 MIN: CPT | Performed by: PHYSICIAN ASSISTANT

## 2022-09-26 NOTE — PROGRESS NOTES
[facility] - Outpatient Follow-up Note  Elida Fay 56 y o  female MRN: [id number]  Encounter: [id number]          ASSESSMENT AND PLAN:      1  Full incontinence of feces  2  Diarrhea, unspecified type  She is having multiple loose stools per day and upwards of 4 full incontinence episodes per week  She is on Colestipol 1 gm BID - I asked her to increase to this to 2 pills BID and we may go up to 2 pills 3 times a day  Alternatively we could trial Imodium    Due to the severity of her incontinence we will have her see colorectal for possible anal manometry or other treatment recommendations  Consider pelvic floor therapy    Colonoscopy in  was normal    ______________________________________________________________________    SUBJECTIVE:  79-year-old female with chronic diarrhea and fecal incontinence presents for follow-up evaluation  She has been struggling with diarrhea and incontinence for over 20 years  She reports that this started after her gallbladder was removed  She reports that it is getting worse  She is having upwards of 4 full incontinence episodes a week  She reports that when it occurs she has a full soft bowel movements  She reports that she has little to no morning that coming out  She does also struggle with abdominal pain  There is no rectal bleeding  She is currently taking colestipol 1 g twice daily  Although she notes and helps it does not nearly resolved the problem  She has historically failed Imodium, fiber and probiotic  She had a normal colonoscopy in   She has had imaging of her abdomen which was normal   She is not losing weight unexpectedly  REVIEW OF SYSTEMS IS OTHERWISE NEGATIVE        Historical Information   Past Medical History:   Diagnosis Date    Asthma     Colon polyp     Diabetes mellitus ([address] )     Fibromyalgia     Hyperlipidemia      Past Surgical History:   Procedure Laterality Date     SECTION      CHOLECYSTECTOMY      COLONOSCOPY N/A 10/24/2018    Procedure: COLONOSCOPY;  Surgeon: Kai Taveras MD;  Location: [facility]; Service: Gastroenterology    ESOPHAGOGASTRODUODENOSCOPY N/A 10/26/2018    Procedure: ESOPHAGOGASTRODUODENOSCOPY (EGD); Surgeon: Kai Taveras MD;  Location: [facility];   Service: Gastroenterology    TUBAL LIGATION       Social History   Social History     Substance and Sexual Activity   Alcohol Use Yes    Comment: occasionally     Social History     Substance and Sexual Activity   Drug Use Never     Social History     Tobacco Use   Smoking Status Never Smoker   Smokeless Tobacco Never Used     Family History   Problem Relation Age of Onset    Diabetes Mother     No Known Problems Sister     No Known Problems Sister     No Known Problems Maternal Aunt     No Known Problems Maternal Aunt     No Known Problems Maternal Aunt     No Known Problems Maternal Aunt     No Known Problems Maternal Aunt     Cancer Maternal Aunt     Breast cancer Cousin 44    Prostate cancer Maternal Uncle     Prostate cancer Maternal Uncle     Cancer Maternal Uncle        Meds/Allergies       Current Outpatient Medications:     albuterol (PROVENTIL HFA,VENTOLIN HFA) 90 mcg/act inhaler    amitriptyline (ELAVIL) 100 mg tablet    atorvastatin (LIPITOR) 40 mg tablet    Blood Glucose Monitoring Suppl (ONE TOUCH ULTRA 2) w/Device KIT    colestipol (COLESTID) 1 g tablet    Diclofenac Sodium (VOLTAREN) 1 %    dicyclomine (BENTYL) 20 mg tablet    Empagliflozin 25 MG TABS    gabapentin (NEURONTIN) 300 mg capsule    glucose blood test strip    hydrocortisone 2 5 % cream    Insulin Glargine Solostar (Lantus SoloStar) 100 UNIT/ML SOPN    Lancets (ONETOUCH ULTRASOFT) lancets    metFORMIN (GLUCOPHAGE) 1000 MG tablet    montelukast (SINGULAIR) 10 mg tablet    topiramate (Topamax) 50 MG tablet    No Known Allergies        Objective     Blood pressure 110/72, pulse 99, height 5' 4" (1 626 m), weight 71 7 kg (158 lb), SpO2 99 %, not currently breastfeeding  Body mass index is 27 12 kg/m²  PHYSICAL EXAM:      General Appearance:   Alert, cooperative, no distress   HEENT:   Normocephalic, atraumatic, anicteric      Neck:  Supple, symmetrical, trachea midline   Lungs:   Clear to auscultation bilaterally; no rales, rhonchi or wheezing; respirations unlabored    Heart[de-identified]   Regular rate and rhythm; no murmur, rub, or gallop  Abdomen:   Soft, non-tender, non-distended; normal bowel sounds; no masses, no organomegaly    Genitalia:   Deferred    Rectal:   Deferred    Extremities:  No cyanosis, clubbing or edema    Pulses:  2+ and symmetric    Skin:  No jaundice, rashes, or lesions    Lymph nodes:  No palpable cervical lymphadenopathy        Lab Results:   No visits with results within 1 Day(s) from this visit  Latest known visit with results is:   Office Visit on 09/07/2022   Component Date Value    Hemoglobin A1C 09/07/2022 9 4 (A)         Radiology Results:   No results found

## 2022-10-03 ENCOUNTER — TELEPHONE (OUTPATIENT)
Dept: BARIATRICS | Facility: CLINIC | Age: 47
End: 2022-10-03

## 2022-10-05 ENCOUNTER — TELEPHONE (OUTPATIENT)
Dept: FAMILY MEDICINE CLINIC | Facility: CLINIC | Age: 47
End: 2022-10-05

## 2022-10-05 NOTE — TELEPHONE ENCOUNTER
Pt's  stopped in office to drop off form for Dr Elizabeth Camejo to complete to get their water turned back on from [facility]  Form fee signed and form placed in providers bin  Informed pt it could take up to 48 hours for the form

## 2022-10-24 DIAGNOSIS — J45.40 MODERATE PERSISTENT ASTHMA WITHOUT COMPLICATION: Primary | ICD-10-CM

## 2022-10-24 DIAGNOSIS — E11.42 TYPE 2 DIABETES MELLITUS WITH DIABETIC POLYNEUROPATHY, WITH LONG-TERM CURRENT USE OF INSULIN (HCC): ICD-10-CM

## 2022-10-24 DIAGNOSIS — Z79.4 TYPE 2 DIABETES MELLITUS WITH DIABETIC POLYNEUROPATHY, WITH LONG-TERM CURRENT USE OF INSULIN (HCC): ICD-10-CM

## 2022-10-24 NOTE — Clinical Note
Please advise patient to complete her labs about 2 days before her upcoming appointment in December

## 2022-10-24 NOTE — PROGRESS NOTES
Please advise patient to complete her labs about 2 days before her upcoming appointment in December       Jf Guillen DO  [facility]  10/24/2022 8:18 AM

## 2022-10-31 NOTE — PATIENT INSTRUCTIONS
Breast Self Exam for Women   AMBULATORY CARE:   A breast self-exam (BSE)  is a way to check your breasts for lumps and other changes  Regular BSEs can help you know how your breasts normally look and feel  Most breast lumps or changes are not cancer, but you should always have them checked by a healthcare provider  Why you should do a BSE:  Breast cancer is the most common type of cancer in women  Even if you have mammograms, you may still want to do a BSE regularly  If you know how your breasts normally feel and look, it may help you know when to contact your healthcare provider  Mammograms can miss some cancers  You may find a lump during a BSE that did not show up on a mammogram   When you should do a BSE:  If you have periods, you may want to do your BSE 1 week after your period ends  This is the time when your breasts may be the least swollen, lumpy, or tender  You can do regular BSEs even if you are breastfeeding or have breast implants  Call your doctor if:   You find any lumps or changes in your breasts  You have breast pain or fluid coming from your nipples  You have questions or concerns about your condition or care  How to do a BSE:       Look at your breasts in a mirror  Look at the size and shape of each breast and nipple  Check for swelling, lumps, dimpling, scaly skin, or other skin changes  Look for nipple changes, such as a nipple that is painful or beginning to pull inward  Gently squeeze both nipples and check to see if fluid (that is not breast milk) comes out of them  If you find any of these or other breast changes, contact your healthcare provider  Check your breasts while you sit or  the following 3 positions:    [place] your arms down at your sides  Raise your hands and join them behind your head  Put firm pressure with your hands on your hips  Bend slightly forward while you look at your breasts in the mirror  Lie down and feel your breasts    When you lie down, your breast tissue spreads out evenly over your chest  This makes it easier for you to feel for lumps and anything that may not be normal for your breasts  Do a BSE on one breast at a time  Place a small pillow or towel under your left shoulder  Put your left arm behind your head  Use the 3 middle fingers of your right hand  Use your fingertip pads, on the top of your fingers  Your fingertip pad is the most sensitive part of your finger  Use small circles to feel your breast tissue  Use your fingertip pads to make dime-sized, overlapping circles on your breast and armpits  Use light, medium, and firm pressure  First, press lightly  Second, press with medium pressure to feel a little deeper into the breast  Last, use firm pressure to feel deep within your breast     Examine your entire breast area  Examine the breast area from above the breast to below the breast where you feel only ribs  Make small circles with your fingertips, starting in the middle of your armpit  Make circles going up and down the breast area  Continue toward your breast and all the way across it  Examine the area from your armpit all the way over to the middle of your chest (breastbone)  Stop at the middle of your chest     Move the pillow or towel to your right shoulder, and put your right arm behind your head  Use the 3 fingertip pads of your left hand, and repeat the above steps to do a BSE on your right breast     What else you can do to check for breast problems or cancer:  Talk to your healthcare provider about mammograms  A mammogram is an x-ray of your breasts to screen for breast cancer or other problems  Your provider can tell you the benefits and risks of mammograms  The first mammogram is usually at age 39 or 48  Your provider may recommend you start at 36 or younger if your risk for breast cancer is high  Mammograms usually continue every 1 to 2 years until age 76         Follow up with your doctor as directed:  Write down your questions so you remember to ask them during your visits  © Copyright [employer] 2022 Information is for End User's use only and may not be sold, redistributed or otherwise used for commercial purposes  All illustrations and images included in CareNotes® are the copyrighted property of A D A M [employer] Inc  or Radha Gomez  The above information is an  only  It is not intended as medical advice for individual conditions or treatments  Talk to your doctor, nurse or pharmacist before following any medical regimen to see if it is safe and effective for you  Wellness Visit for Adults   AMBULATORY CARE:   A wellness visit  is when you see your healthcare provider to get screened for health problems  Your healthcare provider will also give you advice on how to stay healthy  Write down your questions so you remember to ask them  Ask your healthcare provider how often you should have a wellness visit  What happens at a wellness visit:  Your healthcare provider will ask about your health, and your family history of health problems  This includes high blood pressure, heart disease, and cancer  He or she will ask if you have symptoms that concern you, if you smoke, and about your mood  You may also be asked about your intake of medicines, supplements, food, and alcohol  Any of the following may be done: Your weight  will be checked  Your height may also be checked so your body mass index (BMI) can be calculated  Your BMI shows if you are at a healthy weight  Your blood pressure  and heart rate will be checked  Your temperature may also be checked  Blood and urine tests  may be done  Blood tests may be done to check your cholesterol levels  Abnormal cholesterol levels increase your risk for heart disease and stroke  You may also need a blood or urine test to check for diabetes if you are at increased risk  Urine tests may be done to look for signs of an infection or kidney disease      A physical exam  includes checking your heartbeat and lungs with a stethoscope  Your healthcare provider may also check your skin to look for sun damage  Screening tests  may be recommended  A screening test is done to check for diseases that may not cause symptoms  The screening tests you may need depend on your age, gender, family history, and lifestyle habits  For example, colorectal screening may be recommended if you are 48years old or older  Screening tests you need if you are a woman:   A Pap smear  is used to screen for cervical cancer  Pap smears are usually done every 3 to 5 years depending on your age  You may need them more often if you have had abnormal Pap smear test results in the past  Ask your healthcare provider how often you should have a Pap smear  A mammogram  is an x-ray of your breasts to screen for breast cancer  Experts recommend mammograms every 2 years starting at age 48 years  You may need a mammogram at age 52 years or younger if you have an increased risk for breast cancer  Talk to your healthcare provider about when you should start having mammograms and how often you need them  Vaccines you may need:   Get an influenza vaccine  every year  The influenza vaccine protects you from the flu  Several types of viruses cause the flu  The viruses change over time, so new vaccines are made each year  Get a tetanus-diphtheria (Td) booster vaccine  every 10 years  This vaccine protects you against tetanus and diphtheria  Tetanus is a severe infection that may cause painful muscle spasms and lockjaw  Diphtheria is a severe bacterial infection that causes a thick covering in the back of your mouth and throat  Get a human papillomavirus (HPV) vaccine  if you are female and aged 23 to 32 or male 23 to 24 and never received it  This vaccine protects you from HPV infection  HPV is the most common infection spread by sexual contact   HPV may also cause vaginal, penile, and anal cancers  Get a pneumococcal vaccine  if you are aged 72 years or older  The pneumococcal vaccine is an injection given to protect you from pneumococcal disease  Pneumococcal disease is an infection caused by pneumococcal bacteria  The infection may cause pneumonia, meningitis, or an ear infection  Get a shingles vaccine  if you are 60 or older, even if you have had shingles before  The shingles vaccine is an injection to protect you from the varicella-zoster virus  This is the same virus that causes chickenpox  Shingles is a painful rash that develops in people who had chickenpox or have been exposed to the virus  How to eat healthy:  My Plate is a model for planning healthy meals  It shows the types and amounts of foods that should go on your plate  Fruits and vegetables make up about half of your plate, and grains and protein make up the other half  A serving of dairy is included on the side of your plate  The amount of calories and serving sizes you need depends on your age, gender, weight, and height  Examples of healthy foods are listed below:  Eat a variety of vegetables  such as dark green, red, and orange vegetables  You can also include canned vegetables low in sodium (salt) and frozen vegetables without added butter or sauces  Eat a variety of fresh fruits , canned fruit in 100% juice, frozen fruit, and dried fruit  Include whole grains  At least half of the grains you eat should be whole grains  Examples include whole-wheat bread, wheat pasta, brown rice, and whole-grain cereals such as oatmeal     Eat a variety of protein foods such as seafood (fish and shellfish), lean meat, and poultry without skin (turkey and chicken)  Examples of lean meats include pork leg, shoulder, or tenderloin, and beef round, sirloin, tenderloin, and extra lean ground beef  Other protein foods include eggs and egg substitutes, beans, peas, soy products, nuts, and seeds      Choose low-fat dairy products such as skim or 1% milk or low-fat yogurt, cheese, and cottage cheese  Limit unhealthy fats  such as butter, hard margarine, and shortening  Exercise:  Exercise at least 30 minutes per day on most days of the week  Some examples of exercise include walking, biking, dancing, and swimming  You can also fit in more physical activity by taking the stairs instead of the elevator or parking farther away from stores  Include muscle strengthening activities 2 days each week  Regular exercise provides many health benefits  It helps you manage your weight, and decreases your risk for type 2 diabetes, heart disease, stroke, and high blood pressure  Exercise can also help improve your mood  Ask your healthcare provider about the best exercise plan for you  General health and safety guidelines:   Do not smoke  Nicotine and other chemicals in cigarettes and cigars can cause lung damage  Ask your healthcare provider for information if you currently smoke and need help to quit  E-cigarettes or smokeless tobacco still contain nicotine  Talk to your healthcare provider before you use these products  Limit alcohol  A drink of alcohol is 12 ounces of beer, 5 ounces of wine, or 1½ ounces of liquor  Lose weight, if needed  Being overweight increases your risk of certain health conditions  These include heart disease, high blood pressure, type 2 diabetes, and certain types of cancer  Protect your skin  Do not sunbathe or use tanning beds  Use sunscreen with a SPF 15 or higher  Apply sunscreen at least 15 minutes before you go outside  Reapply sunscreen every 2 hours  Wear protective clothing, hats, and sunglasses when you are outside  Drive safely  Always wear your seatbelt  Make sure everyone in your car wears a seatbelt  A seatbelt can save your life if you are in an accident  Do not use your cell phone when you are driving  This could distract you and cause an accident   Pull over if you need to make a call or send a text message  Practice safe sex  Use latex condoms if are sexually active and have more than one partner  Your healthcare provider may recommend screening tests for sexually transmitted infections (STIs)  Wear helmets, lifejackets, and protective gear  Always wear a helmet when you ride a bike or motorcycle, go skiing, or play sports that could cause a head injury  Wear protective equipment when you play sports  Wear a lifejacket when you are on a boat or doing water sports  © Copyright [employer] 2022 Information is for End User's use only and may not be sold, redistributed or otherwise used for commercial purposes  All illustrations and images included in CareNotes® are the copyrighted property of A D A M , Inc  or [facility] Tucker   The above information is an  only  It is not intended as medical advice for individual conditions or treatments  Talk to your doctor, nurse or pharmacist before following any medical regimen to see if it is safe and effective for you  Perineal Hygiene     No soaps or feminine wash to the vulva  Use only water to cleanse, or water with Dove or [employer] if necessary  No lotion to the area  Use only coconut oil for moisture if needed   No douching     [place] underware, loose fitting clothing  Only perfume-free, dye-free laundry detergent, use a second rinse cycle   Avoid fabric softeners/dryer sheets  Coconut oil as a lubricant (if not using condoms) or another scent-free lubricant (Astroglide, Uberlube) if needed  Partner to avoid the same products as well  Over the counter probiotic to restore vaginal javed may be helpful as well     You may also look into Boric Acid vaginal suppositories to restore vaginal PH balance for up to 2 weeks as directed on the box  You may not use these if you are pregnant Kegel Exercises for Women   AMBULATORY CARE:   Kegel exercises  help strengthen your pelvic muscles   Pelvic muscles hold your pelvic organs, such as your bladder and uterus, in place  Kegel exercises help prevent or control problems with urine incontinence (leakage)  Incontinence may be caused by pregnancy, childbirth, or menopause  Contact your healthcare provider if:   You cannot feel your muscles tighten or relax  You continue to leak urine  You have questions or concerns about your condition or care  Use the correct muscles:  Pelvic muscles are the muscles you use to control urine flow  To target these muscles, stop and start the flow of urine several times  This will help you become familiar with how it feels to tighten and relax these muscles  How to do Kegel exercises:   Empty your bladder  You may lie down, stand up, or sit down to do these exercises  When you first try to do these exercises, it may be easier if you lie down  Tighten or squeeze your pelvic muscles slowly  It may feel like you are trying to hold back urine or gas  Hold this position for 3 seconds  Relax for 3 seconds  Repeat this cycle 10 times  Do 10 sets of Kegel exercises, at least 3 times a day  Do not hold your breath when you do Kegel exercises  Keep your stomach, back, and leg muscles relaxed  As your muscles get stronger, you will be able to hold the squeeze longer  Your healthcare provider may ask that you increase your pelvic muscle squeeze to 10 seconds  After you squeeze for 10 seconds, relax for 10 seconds  What else you should know:   Once you know how to do Kegel exercises, use different positions  You can do these exercises while you lie on the floor, sit at your desk or watch TV, and while you stand  You may notice improved bladder control within about 6 weeks  Tighten your pelvic muscles before you sneeze, cough, or lift to prevent urine leakage  Follow up with your doctor as directed:  Write down your questions so you remember to ask them during your visits     © Copyright [employer] 2022 Information is for End User's use only and may not be sold, redistributed or otherwise used for commercial purposes  All illustrations and images included in CareNotes® are the copyrighted property of A [employer] A M , Inc  or Radha Gomez  The above information is an  only  It is not intended as medical advice for individual conditions or treatments  Talk to your doctor, nurse or pharmacist before following any medical regimen to see if it is safe and effective for you  Menopause   WHAT YOU NEED TO KNOW:   What is menopause? Menopause is a normal stage in a woman's life when her monthly periods stop  You are considered to be in menopause when you have not had a period for a full year after the age of 36  Menopause usually occurs between ages 52 to 48  Perimenopause is a stage before menopause that may cause signs and symptoms similar to menopause  Perimenopause may start about 4 years before menopause  What causes menopause? Menopause starts when the ovaries stop making the female hormones estrogen and progesterone  After menopause, you are no longer able to become pregnant  Any of the following may trigger menopause or early menopause:  Surgery, including a hysterectomy or oophorectomy    Family history of early menopause    Smoking    Chemotherapy or pelvic radiation    Chromosome abnormalities, including Elaine syndrome and Fragile X syndrome    Premature ovarian insufficiency (the ovaries stop producing eggs before age 36)    What are the signs and symptoms of menopause?   You may have any of the following:  Irregular menstrual cycles with heavy vaginal bleeding followed by decreased bleeding until it stops    Hot flashes (feeling warm, flushed, and sweaty)    Vaginal changes such as increased dryness    Mood changes such as anxiety, depression, or decreased desire to have sex    Trouble sleeping, joint pain, headaches    Brittle nails, hair on chin or chest where it is normally absent    Decrease in breast size and change in skin texture    Weight gain    How is menopause treated or managed? Hormone replacement therapy (HRT) is medicine that replaces your low hormone levels  HRT contains estrogen and sometimes progestin  HRT has several benefits  HRT helps prevent osteoporosis, which decreases your risk for bone fractures  HRT also protects you from colorectal cancer  HRT also has some risks  HRT increases your risk for breast cancer, blood clots, heart disease, a heart attack, or a stroke  If you are 72 years or older, HRT can also increase your risk for dementia  Your risk for uterine or endometrial cancer is higher if you take estrogen-only HRT  Manage hot flashes  Hot flashes are brief periods of feeling very warm, flushed, and sweaty  Hot flashes can last from a few seconds to several minutes  They may happen many times during the day, and are common at night  Layer your clothing so that you can easily remove some clothing and cool yourself during a hot flash  Cold drinks may also be helpful  Non-hormone medicines can help relieve or prevent hot flashes  Examples include certain antidepressants, nerve medicines, and high blood pressure medicines  Reduce vaginal dryness by using over-the-counter vaginal creams  Vaginal dryness may cause you to have pain or discomfort during sex  Only use creams that are made for vaginal use  Do  not  use petroleum jelly  You may put an estrogen cream in and around your vagina  Estrogen cream may help decrease vaginal dryness and lower your risk of vaginal infections  Continue to use birth control during perimenopause if you do not want to get pregnant  You may need to use birth control until it has been 1 year since your periods stopped  Ask your healthcare provider when you can stop using birth control to prevent pregnancy  How can I live a healthy lifestyle during and after menopause?   After menopause, your risk for heart disease and bone loss increases  Ask about these and other ways to stay healthy:  Exercise regularly  Exercise helps you maintain a healthy weight  Exercise can also help to control your blood pressure and cholesterol levels  Include weight-bearing exercise for strong bones  Weight bearing exercise is recommended for at least 30 minutes, 3 times a week  Ask your healthcare provider about the best exercise plan for you  Eat a variety of healthy foods  Include fruits, vegetables, whole grains (whole-wheat bread, pasta, and cereals), low-fat dairy, and lean protein foods (beans, poultry, and fish)  Limit foods high in sodium (salt)  Ask your healthcare provider for more information about a meal plan that is right for you  Do not smoke  If you smoke, it is never too late to quit  You are more likely to have a heart attack, lung disease, blood clots, and cancer if you smoke  Ask your healthcare provider for information if you need help quitting  Take supplements as directed  You may need extra calcium and vitamin D to help prevent osteoporosis  Limit alcohol and caffeine  Alcohol and caffeine may worsen your symptoms  When should I call my doctor? You have vaginal bleeding after menopause  You have questions or concerns about your condition or care  CARE AGREEMENT:   You have the right to help plan your care  Learn about your health condition and how it may be treated  Discuss treatment options with your healthcare providers to decide what care you want to receive  You always have the right to refuse treatment  The above information is an  only  It is not intended as medical advice for individual conditions or treatments  Talk to your doctor, nurse or pharmacist before following any medical regimen to see if it is safe and effective for you    © Copyright [employer] 2022 Information is for End User's use only and may not be sold, redistributed or otherwise used for commercial purposes  All illustrations and images included in CareNotes® are the copyrighted property of A D A M [employer] Inc  or Radha Gomez  For vaginal dryness: You may use:     Coconut oil (organic, pure, unscented) as needed for moisture or lubrication  ( Do not use if allergic)       Replens moisture restore external comfort gel daily ( use as directed on the box)        Replens long lasting vaginal moisturizer  ( use as directed on the box)       For Vaginal Lubrication:        You may use:     Coconut oil (organic, pure, unscented) as needed for lubrication during intercourse  (Do not use if allergic)               Replens silky smooth lubricant, premium silicone based lubricant for intercourse  ( use as directed, a small amount will provide an enhanced natural feeling)     Any premium over the counter vaginal lubricant water or silicone based  Silicone based will have more staying power  For Vulvar hygiene:     No soaps or feminine wash to the vulva with the exception of Dove or Dove Sensitive Skin bar soap if necessary  Only perfume-free, dye-free laundry detergent, use a second rinse cycle  Avoid fabric softeners/dryer sheets  No lotion to the area  No Douching   Coconut oil as a lubricant (if not using condoms) or another scent-free premium lubricant  Loose fitting cotton underwear and loose fitting outer clothing   Partner to avoid the same products as well  Over the counter probiotic taken orally may help to restore vaginal javed  Prophylactic NSAID therapy for Painful or Heavy menses     Ibuprofen or Naproxen (chose 1 or the other, do not take both), Dose as noted on the box  Typically Ibuprofen dose is 600 mg, (3 tablets) every 6-8 hours  Typically Naproxen dose is 500 mg every 12 hours  Start taking medication 2 days prior to onset of menses and continue taking through the first 3 days of menses   Make sure you take consistently this is important  You need to take with food to decrease any gastrointestinal upset effects    This is proven therapy to reduce you flow and cramping by 50 %    Life style changes that have a positive effect on painful and heavy periods are as follows   Daily physical exercise    Increase fiber, fresh fruits and vegetables in your diet    Increase daily water intake    Heating pads(do not apply directly to skin, apply over clothing or towel)   Warm Baths   Relaxation techniques, meditation, massage, yoga and mindfulness     These are all suggestion for improving your sense of frustrations with your menstrual cycle and improving your overall wellness and lifestyle     Urinary Incontinence   WHAT YOU NEED TO KNOW:   Urinary incontinence (UI) is when you lose control of your bladder  UI develops because your bladder cannot store or empty urine properly  The 3 most common types of UI are stress incontinence, urge incontinence, or both  DISCHARGE INSTRUCTIONS:   Call your doctor if:   You have severe pain  You are confused or cannot think clearly  You have a fever  You see blood in your urine  You have pain when you urinate  You have new or worse pain, even after treatment  Your mouth feels dry or you have vision changes  Your urine is cloudy or smells bad  You have questions or concerns about your condition or care  Medicines:   Medicines  may be given to help strengthen your bladder control  Take your medicine as directed  Contact your healthcare provider if you think your medicine is not helping or if you have side effects  Tell him or her if you are allergic to any medicine  Keep a list of the medicines, vitamins, and herbs you take  Include the amounts, and when and why you take them  Bring the list or the pill bottles to follow-up visits  Carry your medicine list with you in case of an emergency  Do pelvic muscle exercises often:  Your pelvic muscles help you stop urinating   Squeeze these muscles tight for 5 seconds, then relax for 5 seconds  Gradually work up to squeezing for 10 seconds  Do 3 sets of 15 repetitions a day, or as directed  This will help strengthen your pelvic muscles and improve bladder control  Train your bladder:  Go to the bathroom at set times, such as every 2 hours, even if you do not feel the urge to go  You can also try to hold your urine when you feel the urge to go  For example, hold your urine for 5 minutes when you feel the urge to go  As that becomes easier, hold your urine for 10 minutes  Self-care:   Keep a UI record  Write down how often you leak urine and how much you leak  Make a note of what you were doing when you leaked urine  Drink liquids as directed  Ask your healthcare provider how much liquid to drink each day and which liquids are best for you  You may need to limit the amount of liquid you drink to help control your urine leakage  Do not drink any liquid right before you go to bed  Limit or do not have drinks that contain caffeine or alcohol  Prevent constipation  Eat a variety of high-fiber foods  Good examples are high-fiber cereals, beans, vegetables, and whole-grain breads  Prune juice may help make your bowel movement softer  Walking is the best way to trigger your intestines to have a bowel movement  Exercise regularly and maintain a healthy weight  Ask your healthcare provider how much you should weigh and about the best exercise plan for you  Weight loss and exercise will decrease pressure on your bladder and help you control your leakage  Ask him or her to help you create a weight loss plan if you are overweight  Use a catheter as directed  to help empty your bladder  A catheter is a tiny, plastic tube that is put into your bladder to drain your urine  Your healthcare provider may tell you to use a catheter to prevent your bladder from getting too full and leaking urine  Go to behavior therapy as directed    Behavior therapy may be used to help you learn to control your urge to urinate  Follow up with your doctor as directed:  Write down your questions so you remember to ask them during your visits  © Copyright [employer] 2022 Information is for End User's use only and may not be sold, redistributed or otherwise used for commercial purposes  All illustrations and images included in CareNotes® are the copyrighted property of [employer]  or Radha Tucker   The above information is an  only  It is not intended as medical advice for individual conditions or treatments  Talk to your doctor, nurse or pharmacist before following any medical regimen to see if it is safe and effective for you

## 2022-10-31 NOTE — PROGRESS NOTES
Diagnoses and all orders for this visit:    Encounter for gynecological examination without abnormal finding  -     Liquid-based pap, screening    Encounter for screening mammogram for malignant neoplasm of breast  -     Mammo screening bilateral w 3d & cad; Future    Cervical cancer screening  -     Ambulatory Referral to Obstetrics / Gynecology    Pelvic pain  -     US pelvis complete w transvaginal; Future    Stress incontinence  -     Ambulatory referral to Physical Therapy; Future    Vulvar irritation  -     triamcinolone (KENALOG) 0 1 % ointment; Apply topically 2 (two) times a day        Perineal hygiene reviewed advised to discontinue use of all products to perineal area except for dove sensitive soap  Apply a small amount of Vaseline to vulvar tissues when wearing panty liners  Weight bearing exercises minium of 150 mins/weekly advised  Kegel exercises recommended  PT referral given  SBE encouraged, ASCCP guidelines reviewed  Condoms encouraged with all sexual activity to prevent STI's  Gardisil vaccines recommended up to age 39  Calcium/ Vit D dietary requirements discussed,   Advised to call with any issues,  all concerns & questions addressed  See provided information in your after visit summary   Call and schedule for pelvic ultrasound  F/U Annually and PRN      Health Maintenance:    Last PAP:  2018 No results noted from outside network  Next PAP Due: Collected today     Last Mammogram: 2021    Life time Lucian Smith % 4 69, Density B scattered areas of fibroglandular density , Bi-Rads 1 Negative  Next Mammogram: order given    Last Colonoscopy: 03/15/2021  RTO in 5 years          Subjective    CC: Yearly Exam      Jamilah Maradiaga is a 56 y o  female here for an annual exam    GYN hx includes: 2 Vaginal 1 C section, Pre eclampsia, GDM  No personal Hx of breast, cervical, ovarian or colon CA   Tubal   Family hx of:  No GYN cancers  Medically stable,Type 2 diabetic,  reports no changes in medical Hx, follows with PMD    Patient's last menstrual period was 10/26/2022 (exact date)  Her menstrual cycles are irregular  She denies issues with bleeding or her menses  Does report some are heavier than others,  she does have bad cramps with each cycle  Takes tylenol with no relief  We discussed NSAID prophylactic therapy at length instructions provided in AVS   Patient advised that this is not helpful follow-up with an office visit to discuss other modalities that may be effective  Brief discussion on Mirena IUD pamphlet given to review at home,  patient may be interested  She was told about in the past but her insurance at that time would not cover  Denies history of abnormal pap smear  She denies breast concerns, abnormal vaginal discharge, vaginal itching, odor, irritation, bowel dysfunction, urinary symptoms, reports external vulvar irritation& itching  Reports urinary leakage with cough, laugh, sneeze  Has tried Zula Levers at home, not effective   Co pelvic pain, & dyspareunia today with deep penetration    She is sexually active  Monogamous relationship  Her current method of contraception includes tubal, Denies any issues with her BCM  Bam Bolaños She does not want STD testing today  Denies intimate partner violence    Past Medical History:   Diagnosis Date   • Asthma    • Colon polyp    • Diabetes mellitus ([facility] [address] )    • Fibromyalgia    • Hyperlipidemia    • Varicella      Past Surgical History:   Procedure Laterality Date   •  SECTION     • CHOLECYSTECTOMY     • COLONOSCOPY N/A 10/24/2018    Procedure: COLONOSCOPY;  Surgeon: Maris Simons MD;  Location: [facility]; Service: Gastroenterology   • ESOPHAGOGASTRODUODENOSCOPY N/A 10/26/2018    Procedure: ESOPHAGOGASTRODUODENOSCOPY (EGD); Surgeon: Maris Simons MD;  Location: [facility];   Service: Gastroenterology   • TUBAL LIGATION         Immunization History   Administered Date(s) Administered   • COVID-19 PFIZER VACCINE 0 3 ML IM 04/15/2021, 05/10/2021   • COVID-19 Pfizer vac (Moy-sucrose, gray cap) 12 yr+ IM 02/04/2022   • Hep B, adult 08/08/2014, 09/09/2014, 02/09/2015   • INFLUENZA 09/19/2011, 09/09/2014, 12/31/2015, 09/26/2016, 11/09/2018   • Influenza, injectable, quadrivalent, preservative free 0 5 mL 10/20/2018   • Influenza, recombinant, quadrivalent,injectable, preservative free 09/16/2020, 10/18/2021   • Influenza, seasonal, injectable 10/13/2008   • Pneumococcal Polysaccharide PPV23 06/04/2014, 09/26/2016   • Tdap 03/30/2009       Family History   Problem Relation Age of Onset   • Diabetes Mother    • No Known Problems Sister    • No Known Problems Sister    • No Known Problems Maternal Aunt    • No Known Problems Maternal Aunt    • No Known Problems Maternal Aunt    • No Known Problems Maternal Aunt    • No Known Problems Maternal Aunt    • Cancer Maternal Aunt    • Breast cancer Cousin 44   • Prostate cancer Maternal Uncle    • Prostate cancer Maternal Uncle    • Cancer Maternal Uncle      Social History     Tobacco Use   • Smoking status: Never Smoker   • Smokeless tobacco: Never Used   Vaping Use   • Vaping Use: Never used   Substance Use Topics   • Alcohol use: Yes     Comment: occasionally   • Drug use: Never       Current Outpatient Medications:   •  albuterol (PROVENTIL HFA,VENTOLIN HFA) 90 mcg/act inhaler, Inhale 1 puff every 4 (four) hours as needed for wheezing, Disp: 6 7 g, Rfl: 2  •  amitriptyline (ELAVIL) 100 mg tablet, Take 1 tablet (100 mg total) by mouth daily at bedtime, Disp: 90 tablet, Rfl: 0  •  atorvastatin (LIPITOR) 40 mg tablet, Take 1 tablet (40 mg total) by mouth daily at bedtime, Disp: 30 tablet, Rfl: 2  •  Blood Glucose Monitoring Suppl (ONE TOUCH ULTRA 2) w/Device KIT, as directed, Disp: , Rfl:   •  colestipol (COLESTID) 1 g tablet, Take 1 tablet (1 g total) by mouth 3 (three) times a day, Disp: 90 tablet, Rfl: 3  •  Diclofenac Sodium (VOLTAREN) 1 %, Apply 2 g topically 4 (four) times a day, Disp: 50 g, Rfl: 1  • dicyclomine (BENTYL) 20 mg tablet, Take 1 tablet (20 mg total) by mouth every 6 (six) hours as needed (abdominal pain), Disp: 60 tablet, Rfl: 3  •  Empagliflozin 25 MG TABS, Take 1 tablet (25 mg total) by mouth every morning, Disp: 90 tablet, Rfl: 1  •  gabapentin (NEURONTIN) 300 mg capsule, Take 1 capsule (300 mg total) by mouth 3 (three) times a day, Disp: 90 capsule, Rfl: 2  •  glucose blood test strip, TEST BLOOD SUGARS 3 TIMES DAILY, Disp: , Rfl:   •  hydrocortisone 2 5 % cream, Apply topically 3 (three) times a day, Disp: 30 g, Rfl: 0  •  Insulin Glargine Solostar (Lantus SoloStar) 100 UNIT/ML SOPN, Inject 0 35 mL (35 Units total) under the skin daily at bedtime, Disp: 15 mL, Rfl: 2  •  Lancets (ONETOUCH ULTRASOFT) lancets, uncontrolled DM, hyperglycemia, hypertension, test 4 times daily, 250 02 150/month, Disp: , Rfl:   •  metFORMIN (GLUCOPHAGE) 1000 MG tablet, Take 1 tablet (1,000 mg total) by mouth 2 (two) times a day with meals, Disp: 60 tablet, Rfl: 2  •  montelukast (SINGULAIR) 10 mg tablet, Take 1 tablet (10 mg total) by mouth daily at bedtime, Disp: 30 tablet, Rfl: 2  •  topiramate (Topamax) 50 MG tablet, Take 1 tablet (50 mg total) by mouth 2 (two) times a day, Disp: 60 tablet, Rfl: 2  •  triamcinolone (KENALOG) 0 1 % ointment, Apply topically 2 (two) times a day, Disp: 30 g, Rfl: 0  Patient Active Problem List    Diagnosis Date Noted   • Intractable migraine without aura and without status migrainosus 2022   • Fibromyalgia 2021   • Moderate persistent asthma without complication 15/18/2851   • Seasonal allergies 2020   • Hyperglycemia 2020   • Abdominal pain 10/20/2018   • Diarrhea 10/20/2018   • Hepatic lesion 10/20/2018   • Type 2 diabetes mellitus with diabetic polyneuropathy, with long-term current use of insulin [facility] ) 10/20/2018   • Colitis, acute    • Enteritis        No Known Allergies    OB History    Para Term  AB Living   3 3 3     3   SAB IAB Ectopic Multiple Live Births           3      # Outcome Date GA Lbr Tobi/2nd Weight Sex Delivery Anes PTL Lv   3 Term            2 Term            1 Term               Obstetric Comments   2 vaginal deliveries    1         Vitals:    22 1016   BP: 110/74   BP Location: Right arm   Patient Position: Sitting   Cuff Size: Large   Weight: 70 8 kg (156 lb)   Height: 5' 4" (1 626 m)     Body mass index is 26 78 kg/m²  Review of Systems     Constitutional: Negative for chills, fatigue, fever, headaches, visual disturbances, and unexpected weight change  Respiratory: Negative for cough, & shortness of breath  Cardiovascular: Negative for chest pain       Gastrointestinal: Negative for Abd pain, nausea & vomiting, constipation and diarrhea  Genitourinary: Negative for difficulty urinating, dysuria, hematuria, dyspareunia, unusual vaginal bleeding or discharge  Skin: Negative skin changes    Physical Exam     Constitutional: Alert & Oriented x3, well-developed and well-nourished  No distress  HENT: Atraumatic, Normocephalic, Conjunctivae clear  Neck: Normal range of motion  Neck supple  No thyromegaly, mass, nodules or tenderness  Pulmonary: Effort normal    Abdominal: Soft  No tenderness or masses  Musculoskeletal: Normal ROM  Skin: Warm & Dry  Psychological: Normal mood, thought content, behavior & judgement     Breasts:   Right: tissue soft without masses, tenderness, skin changes or nipple discharge  No areas of erythema or pain  No subclavicular, axillary, pectoral adenopathy  Left:  tissue soft without masses, tenderness, skin changes or nipple discharge  No areas of erythema or pain  No subclavicular, axillary, pectoral adenopathy    Pelvic exam was performed with patient supine, lithotomy position  Labia: Negative rash, tenderness, lesion or injury on the right labia  Negative rash, tenderness, lesion or injury on the left labia     Vulva appears to be slightly swollen with some indication of itch scratching cycle  Urethral meatus:  Negative for  tenderness, inflammation or discharge  Uterus: not deviated, enlarged, fixed or tender  Cervix: No CMT, no discharge or friability  Right adnexa: no mass, no tenderness and no fullness  Left adnexa: no mass, no tenderness and no fullness  Vagina: No erythema, tenderness, masses, or foreign body in the vagina  No signs of injury around the vagina  No unusual vaginal discharge   Perineum without lesions, signs of injury, erythema or swelling  Inguinal Canal:        Right: No inguinal adenopathy or hernia present  Left: No inguinal adenopathy or hernia present

## 2022-11-01 ENCOUNTER — OFFICE VISIT (OUTPATIENT)
Dept: OBGYN CLINIC | Facility: CLINIC | Age: 47
End: 2022-11-01

## 2022-11-01 VITALS
WEIGHT: 156 LBS | HEIGHT: 64 IN | DIASTOLIC BLOOD PRESSURE: 74 MMHG | SYSTOLIC BLOOD PRESSURE: 110 MMHG | BODY MASS INDEX: 26.63 KG/M2

## 2022-11-01 DIAGNOSIS — Z01.419 ENCOUNTER FOR GYNECOLOGICAL EXAMINATION WITHOUT ABNORMAL FINDING: Primary | ICD-10-CM

## 2022-11-01 DIAGNOSIS — R10.2 PELVIC PAIN: ICD-10-CM

## 2022-11-01 DIAGNOSIS — N39.3 STRESS INCONTINENCE: ICD-10-CM

## 2022-11-01 DIAGNOSIS — Z12.31 ENCOUNTER FOR SCREENING MAMMOGRAM FOR MALIGNANT NEOPLASM OF BREAST: ICD-10-CM

## 2022-11-01 DIAGNOSIS — N90.89 VULVAR IRRITATION: ICD-10-CM

## 2022-11-01 DIAGNOSIS — Z12.4 CERVICAL CANCER SCREENING: ICD-10-CM

## 2022-11-01 NOTE — PROGRESS NOTES
2 vaginal deliveries and 1 C- section   LMP: 10/26/2022 still on her cycle   PMB:  SA: YES   HPV: Not on file   Birth control: NONE  Last pap: 2018  Last mammo: 2021 Negative   Last colonoscopy: 03/15/2021 RTO in 5 years   Last Dexa: Not on file  Family History:  Cousin - Breast cancer   2 Maternal uncle - prostate cancer

## 2022-11-03 LAB
HPV HR 12 DNA CVX QL NAA+PROBE: POSITIVE
HPV16 DNA CVX QL NAA+PROBE: NEGATIVE
HPV18 DNA CVX QL NAA+PROBE: NEGATIVE

## 2022-11-09 LAB
LAB AP GYN PRIMARY INTERPRETATION: NORMAL
Lab: NORMAL
PATH INTERP SPEC-IMP: NORMAL

## 2022-11-14 ENCOUNTER — EVALUATION (OUTPATIENT)
Dept: PHYSICAL THERAPY | Facility: CLINIC | Age: 47
End: 2022-11-14

## 2022-11-14 DIAGNOSIS — M25.561 CHRONIC PAIN OF RIGHT KNEE: Primary | ICD-10-CM

## 2022-11-14 DIAGNOSIS — G89.29 CHRONIC PAIN OF RIGHT KNEE: Primary | ICD-10-CM

## 2022-11-14 NOTE — PROGRESS NOTES
PT Evaluation     Today's date: 2022  Patient name: Shonda Gary  : 1975  MRN: [id number]  Referring provider: Kamlesh Rea DO  Dx:   Encounter Diagnosis     ICD-10-CM    1  Chronic pain of right knee  M25 561 Ambulatory Referral to Physical Therapy    G89 29        Start Time: 1100  Stop Time: 1145  Total time in clinic (min): 45 minutes    Assessment  Assessment details: Pt is a 57 y/o female presenting to physical therapy with chronic R lateral knee pain  Upon examination, pt presents with impairments of decreased strength, decreased ROM, and increased pain of pt's R knee and hip resulting in limitations of self-care/ADLs, household activities, stair negotiation, and ambulation  Pt plan of care will focus on improving strength and ROM of pt's R knee and hip as well as decreasing pain and improving tolerance to functional activities  Pt would benefit from skilled physical therapy to facilitate a return to his prior level of function  Impairments: abnormal or restricted ROM, activity intolerance, impaired physical strength, lacks appropriate home exercise program, pain with function and poor body mechanics  Functional limitations: self-care/ADLs, household activities, ambulation, and stair negotiation  Symptom irritability: moderateUnderstanding of Dx/Px/POC: good   Prognosis: good    Goals  STG - 4 weeks  1  Pt will have subjective decrease in pain of her R knee by 2 levels or more during ambulation  2  Pt will demonstrate an improvement of 10 degrees or more of R knee flexion to facilitate performance of ADLs    LTG - 8 weeks  1  Pt will demonstrate 3+/5 gross strength or better of her R hip and knee in all planes to facilitate performance of ADLs  2  Pt's FOTO score will improve from 49 to 62 or better to facilitate a return to pt's prior level of function      Plan  Patient would benefit from: PT eval and skilled physical therapy  Planned modality interventions: cryotherapy, thermotherapy: hydrocollator packs and unattended electrical stimulation  Planned therapy interventions: activity modification, ADL training, balance, body mechanics training, flexibility, functional ROM exercises, gait training, graded exercise, home exercise program, joint mobilization, manual therapy, massage, Fuchs taping, neuromuscular re-education, patient education, self care, stretching, strengthening, therapeutic activities and therapeutic exercise  Frequency: 1x week  Duration in weeks: 8  Plan of Care beginning date: 11/14/2022  Plan of Care expiration date: 1/9/2023  Treatment plan discussed with: patient        Subjective    Objective     Palpation     Right   Tenderness of the TFL  Tenderness     Right Knee   Tenderness in the lateral joint line, lateral patella, medial joint line and medial patella       Active Range of Motion     Right Hip   Flexion: 30 degrees with pain  Abduction: 10 degrees with pain  External rotation (90/90): 10 degrees with pain  Internal rotation (90/90): 10 degrees with pain  Left Knee   Normal active range of motion    Right Knee   Flexion: 70 degrees with pain  Extension: 0 degrees with pain    Mobility   Patellar Mobility:     Right Knee   Hypomobile: superior and inferior     Strength/Myotome Testing     Right Hip   Planes of Motion   Flexion: 2+  Extension: 2+  Abduction: 2+  Adduction: 2+  External rotation: 2+  Internal rotation: 2+    Left Knee   Normal strength    Right Knee   Flexion: 2+  Extension: 2+    General Comments:      Knee Comments  Pt demonstrates an antalgic gait pattern with decrease step length and weightbearing of her RLE             Precautions: Fibromyalgia, Diabetes      Manuals 11/14            PROM of R knee 5'                                                   Neuro Re-Ed             Education on POC, diagnosis, and HEP 5'            Quad sets 1x10 5"                                                                             Ther Ex             Heel slides 1x10 5"            Cuing for proper technique 3'                                                                                          Ther Activity                                       Gait Training                                       Modalities

## 2022-11-14 NOTE — PROGRESS NOTES
PT Evaluation     Today's date: 2022  Patient name: Jean Carlos Lewis  : 1975  MRN: [id number]  Referring provider: Rogers Srinivasan DO  Dx:   Encounter Diagnosis     ICD-10-CM    1  Chronic pain of right knee  M25 561 Ambulatory Referral to Physical Therapy    G89 29        Start Time: 1100  Stop Time: 1145  Total time in clinic (min): 45 minutes    Assessment  Assessment details: Pt is a 55 y/o female presenting to physical therapy with chronic R lateral knee pain  Upon examination, pt presents with impairments of decreased strength, decreased ROM, and increased pain of pt's R knee and hip resulting in limitations of self-care/ADLs, household activities, stair negotiation, and ambulation  Pt plan of care will focus on improving strength and ROM of pt's R knee and hip as well as decreasing pain and improving tolerance to functional activities  Pt would benefit from skilled physical therapy to facilitate a return to his prior level of function  Impairments: abnormal gait, abnormal or restricted ROM, activity intolerance, impaired physical strength, lacks appropriate home exercise program, pain with function and poor body mechanics  Functional limitations: self-care/ADLs, household activities, stair negotiation, and ambulationUnderstanding of Dx/Px/POC: good   Prognosis: good    Goals  STG - 4 weeks  1  Pt will have subjective decrease in pain of her R knee by 2 levels or more during ambulation  2  Pt will demonstrate an improvement of 10 degrees or more of R knee flexion to facilitate performance of ADLs    LTG - 8 weeks  1  Pt will demonstrate 3+/5 gross strength or better of her R hip and knee in all planes to facilitate performance of ADLs  2  Pt's FOTO score will improve from 49 to 62 or better to facilitate a return to pt's prior level of function        Plan  Patient would benefit from: PT eval and skilled physical therapy  Planned modality interventions: cryotherapy, thermotherapy: hydrocollator packs and unattended electrical stimulation  Planned therapy interventions: activity modification, ADL training, balance, flexibility, functional ROM exercises, gait training, graded exercise, home exercise program, joint mobilization, manual therapy, massage, Fuchs taping, neuromuscular re-education, patient education, postural training, self care, strengthening, stretching, therapeutic activities and therapeutic exercise  Frequency: 1x week  Duration in weeks: 8  Plan of Care beginning date: 2022  Plan of Care expiration date: 2023  Treatment plan discussed with: patient        Subjective Evaluation    History of Present Illness  Mechanism of injury: Pt is a 55 y/o female presenting to physical therapy with chronic R lateral knee pain  Pt reports suffering a fall getting into her car on  due to her R knee giving out and resulting in excruciating pain in her R knee and lateral upper leg  Pt reports she suffered another fall getting out of the car when her R knee gave out gain and having the same pain  Pt reports she continues to have frequent episodes where her R knee gives out  Pt reports the pain has stayed consistent, however, she is unable to pick or cross her RLE  Pt reports she also has increased pain and instability of her R knee with stairs and walking  Pt reports she will have decreased pain with wrapping and using a brace on her R knee  Quality of life: fair    Pain  Current pain ratin  At best pain ratin  At worst pain rating: 10  Location: R knee  Quality: throbbing  Relieving factors: medications and rest  Aggravating factors: standing, walking, lifting and stair climbing  Progression: no change    Treatments  Previous treatment: medication  Current treatment: medication and physical therapy  Patient Goals  Patient goals for therapy: decreased pain          Objective     Palpation     Right   Tenderness of the TFL       Tenderness     Right Knee   Tenderness in the lateral joint line, lateral patella, medial joint line and medial patella       Active Range of Motion     Right Hip   Flexion: 30 degrees with pain  Abduction: 10 degrees with pain  External rotation (90/90): 10 degrees with pain  Internal rotation (90/90): 10 degrees with pain  Left Knee   Normal active range of motion    Right Knee   Flexion: 70 degrees with pain  Extension: 0 degrees with pain    Mobility   Patellar Mobility:     Right Knee   Hypomobile: superior and inferior     Strength/Myotome Testing     Left Hip   Normal muscle strength    Right Hip   Planes of Motion   Flexion: 2+  Extension: 2+  Abduction: 2+  Adduction: 2+  External rotation: 2+  Internal rotation: 2+    Left Knee   Normal strength    Right Knee   Flexion: 2+  Extension: 2+    General Comments:      Knee Comments  Pt demonstrates an antalgic gait pattern with decrease step length and weightbearing of her RLE             Precautions: Fibromyalgia, Diabetes      Manuals 11/14            PROM of R knee 5'                                                   Neuro Re-Ed             Education on POC, diagnosis, and HEP 5'            Quad set 1x10 5"                                                                             Ther Ex             Heel slides 1x10 5"            Cuing for proper technique 3'                                                                                          Ther Activity                                       Gait Training                                       Modalities

## 2022-11-14 NOTE — PROGRESS NOTES
PT Evaluation     Today's date: 2022  Patient name: Braeden Loera  : 1975  MRN: [id number]  Referring provider: Svetlana Irving DO  Dx: No diagnosis found  Assessment  Assessment details: Upon examination, pt presents with impairments of decreased strength, decreased ROM, and increased pain of pt's (body part) resulting in limitations of (insert limitations)  Pt plan of care will focus on improving strength and ROM of pt's (body part) as well as decreasing pain and improving tolerance to functional activities  Pt would benefit from skilled physical therapy to facilitate a return to his prior level of function      Impairments: abnormal or restricted ROM, activity intolerance, impaired physical strength, lacks appropriate home exercise program and pain with function  Functional limitations: self-care/ADLs, household activities,   Plan  Patient would benefit from: PT eval and skilled physical therapy  Planned modality interventions: cryotherapy, thermotherapy: hydrocollator packs and unattended electrical stimulation  Planned therapy interventions: activity modification, ADL training, graded activity, graded exercise, flexibility, functional ROM exercises, balance, home exercise program, gait training, joint mobilization, manual therapy, massage, Fuchs taping, neuromuscular re-education, patient education, postural training, self care, strengthening, stretching, therapeutic activities and therapeutic exercise  Duration in weeks: 8  Plan of Care beginning date: 2022  Plan of Care expiration date: 2023  Treatment plan discussed with: patient        Subjective    Objective           Precautions: ***      Manuals                                                                 Neuro Re-Ed                                                                                                        Ther Ex Ther Activity                                       Gait Training                                       Modalities

## 2022-12-09 ENCOUNTER — APPOINTMENT (OUTPATIENT)
Dept: PHYSICAL THERAPY | Facility: CLINIC | Age: 47
End: 2022-12-09

## 2022-12-12 ENCOUNTER — OFFICE VISIT (OUTPATIENT)
Dept: FAMILY MEDICINE CLINIC | Facility: CLINIC | Age: 47
End: 2022-12-12

## 2022-12-12 ENCOUNTER — OFFICE VISIT (OUTPATIENT)
Dept: PHYSICAL THERAPY | Facility: CLINIC | Age: 47
End: 2022-12-12

## 2022-12-12 VITALS
SYSTOLIC BLOOD PRESSURE: 124 MMHG | OXYGEN SATURATION: 99 % | HEART RATE: 70 BPM | TEMPERATURE: 97 F | WEIGHT: 160 LBS | BODY MASS INDEX: 27.31 KG/M2 | HEIGHT: 64 IN | DIASTOLIC BLOOD PRESSURE: 76 MMHG

## 2022-12-12 DIAGNOSIS — Z79.4 TYPE 2 DIABETES MELLITUS WITH DIABETIC POLYNEUROPATHY, WITH LONG-TERM CURRENT USE OF INSULIN (HCC): Primary | ICD-10-CM

## 2022-12-12 DIAGNOSIS — M25.561 CHRONIC PAIN OF RIGHT KNEE: Primary | ICD-10-CM

## 2022-12-12 DIAGNOSIS — G89.29 CHRONIC PAIN OF RIGHT KNEE: Primary | ICD-10-CM

## 2022-12-12 DIAGNOSIS — E11.42 TYPE 2 DIABETES MELLITUS WITH DIABETIC POLYNEUROPATHY, WITH LONG-TERM CURRENT USE OF INSULIN (HCC): Primary | ICD-10-CM

## 2022-12-12 LAB
CREAT UR-MCNC: 63.3 MG/DL
MICROALBUMIN UR-MCNC: 13.2 MG/L (ref 0–20)
MICROALBUMIN/CREAT 24H UR: 21 MG/G CREATININE (ref 0–30)
SL AMB POCT HEMOGLOBIN AIC: 9.5 (ref ?–6.5)

## 2022-12-12 RX ORDER — DIPHENOXYLATE HYDROCHLORIDE AND ATROPINE SULFATE 2.5; .025 MG/1; MG/1
TABLET ORAL
COMMUNITY
Start: 2022-11-19

## 2022-12-12 RX ORDER — INSULIN ASPART 100 [IU]/ML
5 INJECTION, SOLUTION INTRAVENOUS; SUBCUTANEOUS
Qty: 15 ML | Refills: 1 | Status: SHIPPED | OUTPATIENT
Start: 2022-12-12

## 2022-12-12 NOTE — PROGRESS NOTES
Daily Note     Today's date: 2022  Patient name: Yumiko Reynolds  : 1975  MRN: [id number]  Referring provider: Susanna Mo DO  Dx:   Encounter Diagnosis     ICD-10-CM    1  Chronic pain of right knee  M25 561     G89 29                      Subjective: Pt reports she fell after she was here last time  Her pain is currently a 7/10  Objective: See treatment diary below      Assessment: Tolerated treatment fair due to pain  Increased reps for quad sets  Added SLRs in standing and minisquats  Hip abd in hooklying was painful so more of an isometric was performed  Good ROM noted with PROM, slight restriction in patellar mobility compared to L side  Assess response to treatment NV  Patient demonstrated fatigue post treatment and would benefit from continued PT      Plan: Continue per plan of care  Precautions: Fibromyalgia, Diabetes      Manuals            PROM of R knee 5' 5'           Patellar ROM  3'                                     Neuro Re-Ed             Education on POC, diagnosis, and HEP 5' 5'           Quad sets 1x10 5" 1x15 5"           STS             Minisquats  10x                                                  Ther Ex             Heel slides 1x10 5" 1x10 5" w strap           Cuing for proper technique 3'            Rec Bike  5'           SLR             Hip add iso  p!            Hip abd c TB  GTB 10x           SAQ  10x           SLRx3   2x10 R           Ther Activity                                       Gait Training                                       Modalities

## 2022-12-12 NOTE — PROGRESS NOTES
Name: Yordy Perez      : 1975      MRN: [id number]  Encounter Provider: Chrissy Almonte DO  Encounter Date: 2022   [facility]     1  Type 2 diabetes mellitus with diabetic polyneuropathy, with long-term current use of insulin ([facility])  -     POCT hemoglobin A1c  -     insulin aspart (NovoLOG FlexPen) 100 UNIT/ML injection pen; Inject 5 Units under the skin 3 (three) times a day with meals  Heavily counseled on diet, exercise and medication/insuling compliance  Will start mealtime insulin  % units of novolog TID  Patient to record blood sugars and follow up in 4 weeks  Will titrate accordingly  Subjective      HPI     Patient presents to the office for follow up  States that she has been taking her medication  Notes that she is not very compliant with a sugar free diet but tries to adhere most of the time  States that she is not exercising outside of PT  Notes that she is doing the metformin 1000 mg BID and the Jardiance 25 mg  She is doing 50 units of Lantus nightly       Review of Systems    Current Outpatient Medications on File Prior to Visit   Medication Sig   • albuterol (PROVENTIL HFA,VENTOLIN HFA) 90 mcg/act inhaler Inhale 1 puff every 4 (four) hours as needed for wheezing   • amitriptyline (ELAVIL) 100 mg tablet Take 1 tablet (100 mg total) by mouth daily at bedtime   • atorvastatin (LIPITOR) 40 mg tablet Take 1 tablet (40 mg total) by mouth daily at bedtime   • Blood Glucose Monitoring Suppl (ONE TOUCH ULTRA 2) w/Device KIT as directed   • colestipol (COLESTID) 1 g tablet Take 1 tablet (1 g total) by mouth 3 (three) times a day   • Diclofenac Sodium (VOLTAREN) 1 % Apply 2 g topically 4 (four) times a day   • dicyclomine (BENTYL) 20 mg tablet Take 1 tablet (20 mg total) by mouth every 6 (six) hours as needed (abdominal pain)   • diphenoxylate-atropine (LOMOTIL) 2 5-0 025 mg per tablet TAKE 1 TABLET BY MOUTH IN THE MORNING AND 1 AT NOON AND 1 IN THE EVENING AND 1 AT BEDTIME   • Empagliflozin 25 MG TABS Take 1 tablet (25 mg total) by mouth every morning   • gabapentin (NEURONTIN) 300 mg capsule Take 1 capsule (300 mg total) by mouth 3 (three) times a day   • glucose blood test strip TEST BLOOD SUGARS 3 TIMES DAILY   • hydrocortisone 2 5 % cream Apply topically 3 (three) times a day   • Insulin Glargine Solostar (Lantus SoloStar) 100 UNIT/ML SOPN Inject 0 35 mL (35 Units total) under the skin daily at bedtime   • Lancets (ONETOUCH ULTRASOFT) lancets uncontrolled DM, hyperglycemia, hypertension, test 4 times daily, 250 02 150/month   • metFORMIN (GLUCOPHAGE) 1000 MG tablet Take 1 tablet (1,000 mg total) by mouth 2 (two) times a day with meals   • montelukast (SINGULAIR) 10 mg tablet Take 1 tablet (10 mg total) by mouth daily at bedtime   • topiramate (Topamax) 50 MG tablet Take 1 tablet (50 mg total) by mouth 2 (two) times a day   • triamcinolone (KENALOG) 0 1 % ointment Apply topically 2 (two) times a day     Objective     /76 (BP Location: Left arm, Patient Position: Sitting, Cuff Size: Adult)   Pulse 70   Temp (!) 97 °F (36 1 °C) (Tympanic)   Ht 5' 4" (1 626 m)   Wt 72 6 kg (160 lb)   SpO2 99%   BMI 27 46 kg/m²     Physical Exam  Vitals reviewed  Constitutional:       General: She is not in acute distress  Appearance: Normal appearance  HENT:      Head: Normocephalic and atraumatic  Right Ear: External ear normal       Left Ear: External ear normal       Nose: Nose normal       Mouth/Throat:      Mouth: Mucous membranes are moist    Eyes:      Extraocular Movements: Extraocular movements intact  Conjunctiva/sclera: Conjunctivae normal    Cardiovascular:      Rate and Rhythm: Normal rate and regular rhythm  Heart sounds: Normal heart sounds  Pulmonary:      Effort: Pulmonary effort is normal       Breath sounds: Normal breath sounds  No wheezing, rhonchi or rales     Abdominal: General: Bowel sounds are normal       Palpations: Abdomen is soft  Musculoskeletal:      Right lower leg: No edema  Left lower leg: No edema  Skin:     General: Skin is warm  Capillary Refill: Capillary refill takes less than 2 seconds  Findings: No rash  Neurological:      Mental Status: She is alert  Mental status is at baseline          Emmanuel Vickers DO

## 2022-12-12 NOTE — PATIENT INSTRUCTIONS
INSULIN DOSAGE INSTRUCTIONS     Name:PATIENTNAME@                        :@       Your Current Insulin  and dose is: Before Breakfast Before Lunch Before Evening Meal Bedtime      Novolog Insulin    5       5    5    0   Regular, Apidra, Humalog or Novolog Sliding Scale:(NO SLIDING SCALE AT THIS TIME)   <80                      151-200 + 2 +2 +2     201-250 + 4 +4 +4 +   251-300 + 6 +6 +6 +   301-350 + 8 +8 +8 +   >350 +10 +10 +10 +          Long Acting Insulin              Lantus           50 units       Additional Instructions:   Please test your blood sugar:  _4_ Times per day  Target Blood sugar range _70_to _140__  Call if your our office if your blood sugar is less than _60_ or greater than _400_  Today's Date 22     Low Blood Sugar     Steps to treat low blood sugar  1    Test blood sugar if you have symptoms of low blood sugar:              Low Blood Sugar Symptoms:  o    Sweaty  o    Dizzy  o    Rapid heartbeat  o    Shaky     o    Bad mood  o    Hungry        2     Treat blood sugar less than 70 with 15 grams of fast-acting carbohydrate:              Examples of 15 grams Fast-Acting Carbohydrate:  o    4 oz juice  o    4 oz regular soda  o    3-4 glucose tablets (chew)  o    3-4 hard candies (chew)               3    Wait 15 minutes and test your blood sugar again             4      If blood sugar is less than 100, repeat steps 2-3         5     When your blood sugar is 100 or more, eat a snack if it will be longer than one hour until your next meal  The snack should be 15 grams of carbohydrate and a protein:              Examples of snacks:  o    ½ sandwich  o    6 crackers with cheese  o    Piece of fruit with cheese or peanut butter  o    6 crackers with peanut butter

## 2022-12-13 ENCOUNTER — TELEPHONE (OUTPATIENT)
Dept: FAMILY MEDICINE CLINIC | Facility: CLINIC | Age: 47
End: 2022-12-13

## 2022-12-13 DIAGNOSIS — J45.40 MODERATE PERSISTENT ASTHMA WITHOUT COMPLICATION: ICD-10-CM

## 2022-12-13 DIAGNOSIS — J30.2 SEASONAL ALLERGIES: ICD-10-CM

## 2022-12-13 DIAGNOSIS — Z79.4 TYPE 2 DIABETES MELLITUS WITH DIABETIC POLYNEUROPATHY, WITH LONG-TERM CURRENT USE OF INSULIN (HCC): ICD-10-CM

## 2022-12-13 DIAGNOSIS — M79.7 FIBROMYALGIA: ICD-10-CM

## 2022-12-13 DIAGNOSIS — E11.42 TYPE 2 DIABETES MELLITUS WITH DIABETIC POLYNEUROPATHY, WITH LONG-TERM CURRENT USE OF INSULIN (HCC): ICD-10-CM

## 2022-12-13 RX ORDER — MONTELUKAST SODIUM 10 MG/1
10 TABLET ORAL
Qty: 30 TABLET | Refills: 2 | Status: SHIPPED | OUTPATIENT
Start: 2022-12-13

## 2022-12-13 RX ORDER — INSULIN GLARGINE 100 [IU]/ML
35 INJECTION, SOLUTION SUBCUTANEOUS
Qty: 15 ML | Refills: 2 | Status: SHIPPED | OUTPATIENT
Start: 2022-12-13

## 2022-12-13 RX ORDER — AMITRIPTYLINE HYDROCHLORIDE 100 MG/1
100 TABLET, FILM COATED ORAL
Qty: 90 TABLET | Refills: 0 | Status: SHIPPED | OUTPATIENT
Start: 2022-12-13

## 2022-12-13 NOTE — TELEPHONE ENCOUNTER
prior auth can be completed       [facility], DO  [facility]  12/13/2022 1:00 PM

## 2022-12-23 ENCOUNTER — OFFICE VISIT (OUTPATIENT)
Dept: GASTROENTEROLOGY | Facility: CLINIC | Age: 47
End: 2022-12-23

## 2022-12-23 VITALS
OXYGEN SATURATION: 98 % | HEIGHT: 64 IN | HEART RATE: 97 BPM | WEIGHT: 154 LBS | DIASTOLIC BLOOD PRESSURE: 64 MMHG | SYSTOLIC BLOOD PRESSURE: 102 MMHG | BODY MASS INDEX: 26.29 KG/M2

## 2022-12-23 DIAGNOSIS — R15.9 FULL INCONTINENCE OF FECES: Primary | ICD-10-CM

## 2022-12-23 RX ORDER — ARIPIPRAZOLE 2 MG/1
2 TABLET ORAL EVERY MORNING
COMMUNITY
Start: 2022-12-19

## 2022-12-23 RX ORDER — AMITRIPTYLINE HYDROCHLORIDE 50 MG/1
50 TABLET, FILM COATED ORAL
COMMUNITY
Start: 2022-12-19

## 2022-12-23 RX ORDER — TRAZODONE HYDROCHLORIDE 50 MG/1
50 TABLET ORAL
COMMUNITY
Start: 2022-12-19

## 2022-12-23 NOTE — PROGRESS NOTES
[facility] - Outpatient Follow-up Note  Candace Adams 56 y o  female MRN: [id number]  Encounter: [id number]          ASSESSMENT AND PLAN:      1  Full incontinence of feces  She saw Dr Ashley Mejia at [facility] who is planning placement of Interstim Sacral Nerve Stimulator  Patient is excited to hopefully be provided relief  She has failed conventional therapy with fiber, probiotic, colestid, imodium and now lomotil    ______________________________________________________________________    SUBJECTIVE: 52-year-old female with a long history of fecal and urinary incontinence who presents for evaluation after seeing colorectal surgery  Plan is for patient to have an InterStim sacral nerve stimulator placed in January  She has been treated with and failed Imodium, fiber, probiotic, Colestid and now Lomotil  REVIEW OF SYSTEMS IS OTHERWISE NEGATIVE  Historical Information   Past Medical History:   Diagnosis Date   • Asthma    • Colon polyp    • Diabetes mellitus ([facility] [address] )    • Fibromyalgia    • Hyperlipidemia    • Varicella      Past Surgical History:   Procedure Laterality Date   •  SECTION     • CHOLECYSTECTOMY     • COLONOSCOPY N/A 10/24/2018    Procedure: COLONOSCOPY;  Surgeon: Annetta Melendez MD;  Location: [facility]; Service: Gastroenterology   • ESOPHAGOGASTRODUODENOSCOPY N/A 10/26/2018    Procedure: ESOPHAGOGASTRODUODENOSCOPY (EGD); Surgeon: Annetta Melendez MD;  Location: [facility];   Service: Gastroenterology   • TUBAL LIGATION       Social History   Social History     Substance and Sexual Activity   Alcohol Use Yes    Comment: occasionally     Social History     Substance and Sexual Activity   Drug Use Never     Social History     Tobacco Use   Smoking Status Never   Smokeless Tobacco Never     Family History   Problem Relation Age of Onset   • Diabetes Mother    • No Known Problems Sister    • No Known Problems Sister    • No Known Problems Maternal Aunt    • No Known Problems Maternal Aunt    • No Known Problems Maternal Aunt    • No Known Problems Maternal Aunt    • No Known Problems Maternal Aunt    • Cancer Maternal Aunt    • Breast cancer Cousin 44   • Prostate cancer Maternal Uncle    • Prostate cancer Maternal Uncle    • Cancer Maternal Uncle        Meds/Allergies       Current Outpatient Medications:   •  albuterol (PROVENTIL HFA,VENTOLIN HFA) 90 mcg/act inhaler  •  amitriptyline (ELAVIL) 100 mg tablet  •  amitriptyline (ELAVIL) 50 mg tablet  •  ARIPiprazole (ABILIFY) 2 mg tablet  •  atorvastatin (LIPITOR) 40 mg tablet  •  Blood Glucose Monitoring Suppl (ONE TOUCH ULTRA 2) w/Device KIT  •  colestipol (COLESTID) 1 g tablet  •  Diclofenac Sodium (VOLTAREN) 1 %  •  dicyclomine (BENTYL) 20 mg tablet  •  diphenoxylate-atropine (LOMOTIL) 2 5-0 025 mg per tablet  •  Empagliflozin 25 MG TABS  •  gabapentin (NEURONTIN) 300 mg capsule  •  glucose blood test strip  •  hydrocortisone 2 5 % cream  •  insulin aspart (NovoLOG FlexPen) 100 UNIT/ML injection pen  •  Insulin Glargine Solostar (Lantus SoloStar) 100 UNIT/ML SOPN  •  Lancets (ONETOUCH ULTRASOFT) lancets  •  metFORMIN (GLUCOPHAGE) 1000 MG tablet  •  montelukast (SINGULAIR) 10 mg tablet  •  topiramate (Topamax) 50 MG tablet  •  traZODone (DESYREL) 50 mg tablet  •  triamcinolone (KENALOG) 0 1 % ointment    No Known Allergies        Objective     Blood pressure 102/64, pulse 97, height 5' 4" (1 626 m), weight 69 9 kg (154 lb), SpO2 98 %, not currently breastfeeding  Body mass index is 26 43 kg/m²  PHYSICAL EXAM:      General Appearance:   Alert, cooperative, no distress   HEENT:   Normocephalic, atraumatic, anicteric      Neck:  Supple, symmetrical, trachea midline   Lungs:   Clear to auscultation bilaterally; no rales, rhonchi or wheezing; respirations unlabored    Heart[de-identified]   Regular rate and rhythm; no murmur, rub, or gallop     Abdomen:   Soft, non-tender, non-distended; normal bowel sounds; no masses, no organomegaly  Genitalia:   Deferred    Rectal:   Deferred    Extremities:  No cyanosis, clubbing or edema    Pulses:  2+ and symmetric    Skin:  No jaundice, rashes, or lesions    Lymph nodes:  No palpable cervical lymphadenopathy        Lab Results:   No visits with results within 1 Day(s) from this visit  Latest known visit with results is:   Office Visit on 12/12/2022   Component Date Value   • Hemoglobin A1C 12/12/2022 9 5 (A)          Radiology Results:   No results found

## 2022-12-30 DIAGNOSIS — Z79.4 TYPE 2 DIABETES MELLITUS WITH DIABETIC POLYNEUROPATHY, WITH LONG-TERM CURRENT USE OF INSULIN (HCC): ICD-10-CM

## 2022-12-30 DIAGNOSIS — E11.42 TYPE 2 DIABETES MELLITUS WITH DIABETIC POLYNEUROPATHY, WITH LONG-TERM CURRENT USE OF INSULIN (HCC): ICD-10-CM

## 2022-12-30 RX ORDER — LANCETS
EACH MISCELLANEOUS
Qty: 100 EACH | Refills: 1 | Status: SHIPPED | OUTPATIENT
Start: 2022-12-30

## 2022-12-30 RX ORDER — BLOOD-GLUCOSE METER
EACH MISCELLANEOUS DAILY
Qty: 1 KIT | Refills: 0 | Status: SHIPPED | OUTPATIENT
Start: 2022-12-30

## 2022-12-30 RX ORDER — PEN NEEDLE, DIABETIC 31 GX5/16"
NEEDLE, DISPOSABLE MISCELLANEOUS
Qty: 100 EACH | Refills: 1 | Status: SHIPPED | OUTPATIENT
Start: 2022-12-30

## 2022-12-30 NOTE — TELEPHONE ENCOUNTER
Pt asking for new insulin kit - states she needs everything reordered and new needle tips     Please advise

## 2022-12-30 NOTE — TELEPHONE ENCOUNTER
Which insulin? Please collect more information on what the patient needs      Meenu Miller DO  [facility]  12/30/2022 2:31 PM

## 2022-12-30 NOTE — TELEPHONE ENCOUNTER
Dr Chloe Patel with pt  She needs a new glucometer because her broke, she also asked for test strips, lancets and pen needles       Laurie David/iam  12/30/22  3:06 PM

## 2023-01-04 ENCOUNTER — APPOINTMENT (OUTPATIENT)
Dept: LAB | Facility: HOSPITAL | Age: 48
End: 2023-01-04

## 2023-01-04 DIAGNOSIS — Z79.4 TYPE 2 DIABETES MELLITUS WITH DIABETIC POLYNEUROPATHY, WITH LONG-TERM CURRENT USE OF INSULIN (HCC): ICD-10-CM

## 2023-01-04 DIAGNOSIS — E11.42 TYPE 2 DIABETES MELLITUS WITH DIABETIC POLYNEUROPATHY, WITH LONG-TERM CURRENT USE OF INSULIN (HCC): ICD-10-CM

## 2023-01-04 LAB
ALBUMIN SERPL BCP-MCNC: 3.7 G/DL (ref 3.5–5)
ALP SERPL-CCNC: 82 U/L (ref 46–116)
ALT SERPL W P-5'-P-CCNC: 34 U/L (ref 12–78)
ANION GAP SERPL CALCULATED.3IONS-SCNC: 11 MMOL/L (ref 4–13)
AST SERPL W P-5'-P-CCNC: 18 U/L (ref 5–45)
BASOPHILS # BLD AUTO: 0.06 THOUSANDS/ÂΜL (ref 0–0.1)
BASOPHILS NFR BLD AUTO: 1 % (ref 0–1)
BILIRUB SERPL-MCNC: 1.06 MG/DL (ref 0.2–1)
BUN SERPL-MCNC: 14 MG/DL (ref 5–25)
CALCIUM SERPL-MCNC: 8.7 MG/DL (ref 8.3–10.1)
CHLORIDE SERPL-SCNC: 99 MMOL/L (ref 96–108)
CHOLEST SERPL-MCNC: 262 MG/DL
CO2 SERPL-SCNC: 22 MMOL/L (ref 21–32)
CREAT SERPL-MCNC: 0.96 MG/DL (ref 0.6–1.3)
EOSINOPHIL # BLD AUTO: 0.22 THOUSAND/ÂΜL (ref 0–0.61)
EOSINOPHIL NFR BLD AUTO: 3 % (ref 0–6)
ERYTHROCYTE [DISTWIDTH] IN BLOOD BY AUTOMATED COUNT: 11.8 % (ref 11.6–15.1)
GFR SERPL CREATININE-BSD FRML MDRD: 71 ML/MIN/1.73SQ M
GLUCOSE P FAST SERPL-MCNC: 434 MG/DL (ref 65–99)
HCT VFR BLD AUTO: 49.5 % (ref 34.8–46.1)
HDLC SERPL-MCNC: 35 MG/DL
HGB BLD-MCNC: 17.8 G/DL (ref 11.5–15.4)
IMM GRANULOCYTES # BLD AUTO: 0.03 THOUSAND/UL (ref 0–0.2)
IMM GRANULOCYTES NFR BLD AUTO: 0 % (ref 0–2)
LYMPHOCYTES # BLD AUTO: 2.71 THOUSANDS/ÂΜL (ref 0.6–4.47)
LYMPHOCYTES NFR BLD AUTO: 36 % (ref 14–44)
MCH RBC QN AUTO: 32.8 PG (ref 26.8–34.3)
MCHC RBC AUTO-ENTMCNC: 36 G/DL (ref 31.4–37.4)
MCV RBC AUTO: 91 FL (ref 82–98)
MONOCYTES # BLD AUTO: 0.53 THOUSAND/ÂΜL (ref 0.17–1.22)
MONOCYTES NFR BLD AUTO: 7 % (ref 4–12)
NEUTROPHILS # BLD AUTO: 4.08 THOUSANDS/ÂΜL (ref 1.85–7.62)
NEUTS SEG NFR BLD AUTO: 53 % (ref 43–75)
NONHDLC SERPL-MCNC: 227 MG/DL
NRBC BLD AUTO-RTO: 0 /100 WBCS
PLATELET # BLD AUTO: 283 THOUSANDS/UL (ref 149–390)
PMV BLD AUTO: 10.4 FL (ref 8.9–12.7)
POTASSIUM SERPL-SCNC: 4.5 MMOL/L (ref 3.5–5.3)
PROT SERPL-MCNC: 8.3 G/DL (ref 6.4–8.4)
RBC # BLD AUTO: 5.42 MILLION/UL (ref 3.81–5.12)
SODIUM SERPL-SCNC: 132 MMOL/L (ref 135–147)
TRIGL SERPL-MCNC: 752 MG/DL
WBC # BLD AUTO: 7.63 THOUSAND/UL (ref 4.31–10.16)

## 2023-01-06 LAB
EST. AVERAGE GLUCOSE BLD GHB EST-MCNC: 240 MG/DL
HBA1C MFR BLD: 10 %

## 2023-01-09 ENCOUNTER — TELEPHONE (OUTPATIENT)
Dept: FAMILY MEDICINE CLINIC | Facility: CLINIC | Age: 48
End: 2023-01-09

## 2023-01-09 DIAGNOSIS — E11.65 TYPE 2 DIABETES MELLITUS WITH HYPERGLYCEMIA, WITH LONG-TERM CURRENT USE OF INSULIN (HCC): ICD-10-CM

## 2023-01-09 DIAGNOSIS — Z79.4 TYPE 2 DIABETES MELLITUS WITH HYPERGLYCEMIA, WITH LONG-TERM CURRENT USE OF INSULIN (HCC): ICD-10-CM

## 2023-01-09 NOTE — TELEPHONE ENCOUNTER
Pt at  - requesting to get a   [employer] - she is not able to program the monitor she received recently  Also requesting refill of atorvastatin 40 mg  Please advise

## 2023-01-11 ENCOUNTER — TELEPHONE (OUTPATIENT)
Dept: FAMILY MEDICINE CLINIC | Facility: CLINIC | Age: 48
End: 2023-01-11

## 2023-01-11 DIAGNOSIS — Z79.4 TYPE 2 DIABETES MELLITUS WITH DIABETIC POLYNEUROPATHY, WITH LONG-TERM CURRENT USE OF INSULIN (HCC): Primary | ICD-10-CM

## 2023-01-11 DIAGNOSIS — E11.42 TYPE 2 DIABETES MELLITUS WITH DIABETIC POLYNEUROPATHY, WITH LONG-TERM CURRENT USE OF INSULIN (HCC): Primary | ICD-10-CM

## 2023-01-11 RX ORDER — ATORVASTATIN CALCIUM 80 MG/1
80 TABLET, FILM COATED ORAL
Qty: 90 TABLET | Refills: 2 | Status: SHIPPED | OUTPATIENT
Start: 2023-01-11

## 2023-01-11 RX ORDER — FLASH GLUCOSE SCANNING READER
EACH MISCELLANEOUS
Qty: 1 EACH | Refills: 0 | Status: SHIPPED | OUTPATIENT
Start: 2023-01-11 | End: 2023-01-12

## 2023-01-11 RX ORDER — FLASH GLUCOSE SENSOR
KIT MISCELLANEOUS
Qty: 2 EACH | Refills: 2 | Status: SHIPPED | OUTPATIENT
Start: 2023-01-11

## 2023-01-11 NOTE — TELEPHONE ENCOUNTER
Pt's pharmacy does not have the [facility] standard model  They can supply pt with the Freestyle Elaina 2 or Freestyle Elaina 3  The more updated device

## 2023-01-11 NOTE — TELEPHONE ENCOUNTER
Scripts sent       Anant Roldan [facility]  1/11/2023 9:42 AM

## 2023-01-11 NOTE — TELEPHONE ENCOUNTER
T/c from pts pharmacy -- report they can not supply pt with a dominguez 14 day reader, so they are requesting Dr Jessie Aden send in order for a dominguez 2 or 3 for pt instead  Please advise

## 2023-01-11 NOTE — TELEPHONE ENCOUNTER
Left a message for pt letting her know that her prescriptions were sent to the pharmacy for her       Laurie David/iam  01/11/23  10:13 AM

## 2023-01-11 NOTE — TELEPHONE ENCOUNTER
Please clarify what this means       Alix Laird, DO  [facility]  1/11/2023 11:52 AM

## 2023-01-12 RX ORDER — FLASH GLUCOSE SCANNING READER
EACH MISCELLANEOUS
Qty: 1 EACH | Refills: 0 | Status: SHIPPED | OUTPATIENT
Start: 2023-01-12

## 2023-01-12 RX ORDER — FLASH GLUCOSE SENSOR
KIT MISCELLANEOUS
Qty: 2 EACH | Refills: 2 | Status: SHIPPED | OUTPATIENT
Start: 2023-01-12

## 2023-01-12 NOTE — TELEPHONE ENCOUNTER
Script for VANDA BRONSON [facility] 2 sent       Nikko Chandler DO  [facility]  1/12/2023 8:43 AM

## 2023-02-07 ENCOUNTER — OFFICE VISIT (OUTPATIENT)
Dept: FAMILY MEDICINE CLINIC | Facility: CLINIC | Age: 48
End: 2023-02-07

## 2023-02-07 VITALS
DIASTOLIC BLOOD PRESSURE: 66 MMHG | OXYGEN SATURATION: 99 % | TEMPERATURE: 96.8 F | BODY MASS INDEX: 26.8 KG/M2 | WEIGHT: 157 LBS | SYSTOLIC BLOOD PRESSURE: 94 MMHG | HEART RATE: 91 BPM | HEIGHT: 64 IN

## 2023-02-07 DIAGNOSIS — Z79.4 TYPE 2 DIABETES MELLITUS WITH DIABETIC POLYNEUROPATHY, WITH LONG-TERM CURRENT USE OF INSULIN (HCC): Primary | ICD-10-CM

## 2023-02-07 DIAGNOSIS — E11.69 HYPERLIPIDEMIA ASSOCIATED WITH TYPE 2 DIABETES MELLITUS (HCC): ICD-10-CM

## 2023-02-07 DIAGNOSIS — E11.42 TYPE 2 DIABETES MELLITUS WITH DIABETIC POLYNEUROPATHY, WITH LONG-TERM CURRENT USE OF INSULIN (HCC): Primary | ICD-10-CM

## 2023-02-07 DIAGNOSIS — E78.5 HYPERLIPIDEMIA ASSOCIATED WITH TYPE 2 DIABETES MELLITUS (HCC): ICD-10-CM

## 2023-02-07 NOTE — PATIENT INSTRUCTIONS
INSULIN DOSAGE INSTRUCTIONS    Your Current Insulin  and dose is: Before Breakfast Before Lunch Before Evening Meal Bedtime      Novolog Insulin    5        5    5    0   Regular, Apidra, Humalog or Novolog Sliding Scale:   <80                      151-200    +2   201-250    +4   251-300    +6   301-350    +8   >350    +10       Long Acting Insulin              Lantus          50 units       Additional Instructions:   Please test your blood sugar:  _4_ Times per day  _ Before Breakfast                                                    _ Alternate Testing  _ Before Lunch                                                         _ 2 Hours After  Meal  _ Before Evening Meal                                             _ 3 a m   _ Before Bedtime Snack                                 Target Blood sugar range _100_to _140__  Call if your our office if your blood sugar is less than _60_ or greater than _400_  Today's Date 02/07/23     Low Blood Sugar     Steps to treat low blood sugar  1    Test blood sugar if you have symptoms of low blood sugar:              Low Blood Sugar Symptoms:  o    Sweaty  o    Dizzy  o    Rapid heartbeat  o    Shaky  o    Bad mood  o    Hungry        2     Treat blood sugar less than 70 with 15 grams of fast-acting carbohydrate:              Examples of 15 grams Fast-Acting Carbohydrate:  o    4 oz juice  o    4 oz regular soda  o    3-4 glucose tablets (chew)  o    3-4 hard candies (chew)               3    Wait 15 minutes and test your blood sugar again             4      If blood sugar is less than 100, repeat steps 2-3         5     When your blood sugar is 100 or more, eat a snack if it will be longer than one hour until your next meal  The snack should be 15 grams of carbohydrate and a protein:              Examples of snacks:  o    ½ sandwich  o    6 crackers with cheese  o    Piece of fruit with cheese or peanut butter  o    6 crackers with peanut butter

## 2023-02-07 NOTE — PROGRESS NOTES
Name: Dannielle Parikh      : 1975      MRN: [id number]  Encounter Provider: Fly Pate DO  Encounter Date: 2023   Encounter department: [facility] [address] [facility] [address]     1  Type 2 diabetes mellitus with diabetic polyneuropathy, with long-term current use of insulin ([facility])  -     Lipid panel; Future  -     Microalbumin / creatinine urine ratio    2  Hyperlipidemia associated with type 2 diabetes mellitus ([address] )  -     Lipid panel; Future  -     Microalbumin / creatinine urine ratio      Patient counseled on insulin and sliding scale  Will add sliding scale at bedtime as fasting AM glucose is elevated in 180s-220s still  Continue with 5 units TID of novolog and 50 units nightly of Lantus  Patient to follow up in 2 months for repeat Hgb A1c  Subjective      HPI     Patient presents to the office for DM follow up  50 units of Lantus nightly and 5 units novolog TID  Has her CGM in place  Patient with glucose log, sugars have improved a lot since addition of mealtime insulin  States that she has had one episode of hypoglycemia, treated with one piece of candy, symptoms resolved       Review of Systems    Current Outpatient Medications on File Prior to Visit   Medication Sig   • albuterol (PROVENTIL HFA,VENTOLIN HFA) 90 mcg/act inhaler Inhale 1 puff every 4 (four) hours as needed for wheezing   • amitriptyline (ELAVIL) 100 mg tablet Take 1 tablet (100 mg total) by mouth daily at bedtime   • amitriptyline (ELAVIL) 50 mg tablet Take 50 mg by mouth daily at bedtime   • ARIPiprazole (ABILIFY) 2 mg tablet Take 2 mg by mouth every morning   • atorvastatin (LIPITOR) 80 mg tablet Take 1 tablet (80 mg total) by mouth daily at bedtime   • Blood Glucose Monitoring Suppl (ONE TOUCH ULTRA 2) w/Device KIT Inject into the skin in the morning As directed   • colestipol (COLESTID) 1 g tablet Take 1 tablet (1 g total) by mouth 3 (three) times a day   • Continuous Blood Gluc  [facility] [place] BENEDICT Use to monitor blood sugar 4 times per day  • Continuous Blood Gluc Sensor (FreeStyle Elaina 2 Sensor) MISC Use to monitor blood sugar 4 times per day, replace every 14 days  • Diclofenac Sodium (VOLTAREN) 1 % Apply 2 g topically 4 (four) times a day   • dicyclomine (BENTYL) 20 mg tablet Take 1 tablet (20 mg total) by mouth every 6 (six) hours as needed (abdominal pain)   • diphenoxylate-atropine (LOMOTIL) 2 5-0 025 mg per tablet TAKE 1 TABLET BY MOUTH IN THE MORNING AND 1 AT NOON AND 1 IN THE EVENING AND 1 AT BEDTIME   • Empagliflozin 25 MG TABS Take 1 tablet (25 mg total) by mouth every morning   • gabapentin (NEURONTIN) 300 mg capsule Take 1 capsule (300 mg total) by mouth 3 (three) times a day   • glucose blood test strip Check blood sugar four times at day  • hydrocortisone 2 5 % cream Apply topically 3 (three) times a day   • Insulin Glargine Solostar (Lantus SoloStar) 100 UNIT/ML SOPN Inject 0 35 mL (35 Units total) under the skin daily at bedtime   • insulin lispro (HumaLOG KwikPen) 100 units/mL injection pen Inject 5 Units under the skin 3 (three) times a day with meals   • Insulin Pen Needle (MM Pen Needles) 31G X 8 MM MISC Use as directed  • Lancets (onetouch ultrasoft) lancets Check blood sugar four times at day  • metFORMIN (GLUCOPHAGE) 1000 MG tablet Take 1 tablet (1,000 mg total) by mouth 2 (two) times a day with meals   • montelukast (SINGULAIR) 10 mg tablet Take 1 tablet (10 mg total) by mouth daily at bedtime   • topiramate (Topamax) 50 MG tablet Take 1 tablet (50 mg total) by mouth 2 (two) times a day   • traZODone (DESYREL) 50 mg tablet Take 50 mg by mouth daily at bedtime   • triamcinolone (KENALOG) 0 1 % ointment Apply topically 2 (two) times a day   • Continuous Blood Gluc Sensor (FreeStyle Elaina 14 Day Sensor) MISC Use to monitor blood sugar 4 times daily for 14 days and then replace   (Patient not taking: Reported on 2/7/2023)     Objective BP 94/66 (BP Location: Left arm, Patient Position: Sitting, Cuff Size: Adult)   Pulse 91   Temp (!) 96 8 °F (36 °C)   Ht 5' 4" (1 626 m)   Wt 71 2 kg (157 lb)   SpO2 99%   BMI 26 95 kg/m²     Physical Exam  Vitals reviewed  Constitutional:       General: She is not in acute distress  Appearance: Normal appearance  HENT:      Head: Normocephalic and atraumatic  Right Ear: External ear normal       Left Ear: External ear normal       Nose: Nose normal       Mouth/Throat:      Mouth: Mucous membranes are moist    Eyes:      Extraocular Movements: Extraocular movements intact  Conjunctiva/sclera: Conjunctivae normal    Cardiovascular:      Rate and Rhythm: Normal rate and regular rhythm  Heart sounds: Normal heart sounds  Pulmonary:      Effort: Pulmonary effort is normal       Breath sounds: Normal breath sounds  No wheezing, rhonchi or rales  Musculoskeletal:      Right lower leg: No edema  Left lower leg: No edema  Skin:     General: Skin is warm  Capillary Refill: Capillary refill takes less than 2 seconds  Neurological:      Mental Status: She is alert  Mental status is at baseline            Sunday Milder, DO

## 2023-03-01 ENCOUNTER — HOSPITAL ENCOUNTER (OUTPATIENT)
Dept: ULTRASOUND IMAGING | Facility: HOSPITAL | Age: 48
Discharge: HOME | End: 2023-03-01

## 2023-03-01 DIAGNOSIS — R10.2 PELVIC PAIN: ICD-10-CM

## 2023-03-07 DIAGNOSIS — R10.2 PELVIC PAIN: Primary | ICD-10-CM

## 2023-03-13 ENCOUNTER — TELEPHONE (OUTPATIENT)
Dept: FAMILY MEDICINE CLINIC | Facility: CLINIC | Age: 48
End: 2023-03-13

## 2023-03-13 ENCOUNTER — PATIENT MESSAGE (OUTPATIENT)
Dept: FAMILY MEDICINE CLINIC | Facility: CLINIC | Age: 48
End: 2023-03-13

## 2023-03-13 DIAGNOSIS — G89.29 CHRONIC PAIN OF RIGHT KNEE: Primary | ICD-10-CM

## 2023-03-13 DIAGNOSIS — M25.561 CHRONIC PAIN OF RIGHT KNEE: Primary | ICD-10-CM

## 2023-03-13 DIAGNOSIS — M79.7 FIBROMYALGIA: ICD-10-CM

## 2023-03-20 DIAGNOSIS — E11.42 TYPE 2 DIABETES MELLITUS WITH DIABETIC POLYNEUROPATHY, WITH LONG-TERM CURRENT USE OF INSULIN (HCC): ICD-10-CM

## 2023-03-20 DIAGNOSIS — S89.91XA RIGHT KNEE INJURY, INITIAL ENCOUNTER: ICD-10-CM

## 2023-03-20 DIAGNOSIS — Z79.4 TYPE 2 DIABETES MELLITUS WITH DIABETIC POLYNEUROPATHY, WITH LONG-TERM CURRENT USE OF INSULIN (HCC): ICD-10-CM

## 2023-03-20 RX ORDER — INSULIN LISPRO 100 [IU]/ML
5 INJECTION, SOLUTION INTRAVENOUS; SUBCUTANEOUS
Qty: 15 ML | Refills: 2 | Status: SHIPPED | OUTPATIENT
Start: 2023-03-20

## 2023-03-20 NOTE — TELEPHONE ENCOUNTER
Pts spouse stopped by the office -    Printed PT referral and locations listings for pt - as per his request - handed directly to pts spouse  Pt very appreciate  Grecia Reyes went to pharmacy to  earlier requested refills - no more refills left - pt was told by pharmacists to request a new scripts for :     Diclofenac Sodium (VOLTAREN) 1 %    insulin lispro (HumaLOG KwikPen) 100 units/mL injection    Fills attached       Please advise         # [phone] Grecia Reyes

## 2023-03-21 ENCOUNTER — EVALUATION (OUTPATIENT)
Dept: PHYSICAL THERAPY | Facility: CLINIC | Age: 48
End: 2023-03-21

## 2023-03-21 DIAGNOSIS — G89.29 CHRONIC PAIN OF RIGHT KNEE: Primary | ICD-10-CM

## 2023-03-21 DIAGNOSIS — M25.561 CHRONIC PAIN OF RIGHT KNEE: Primary | ICD-10-CM

## 2023-03-21 DIAGNOSIS — M79.7 FIBROMYALGIA: ICD-10-CM

## 2023-03-21 NOTE — PROGRESS NOTES
PT Evaluation     Today's date: 3/21/2023  Patient name: Michelle Ralph  : 1975  MRN: [id number]  Referring provider: Mando Wooten DO  Dx:   Encounter Diagnosis     ICD-10-CM    1  Chronic pain of right knee  M25 561 Ambulatory Referral to Physical Therapy    G89 29       2  Fibromyalgia  M79 7 Ambulatory Referral to Physical Therapy          Start Time: 1500  Stop Time: 5854  Total time in clinic (min): 45 minutes    Assessment  Assessment details: Pt is a 57 y/o female presenting to physical therapy with chronic R knee  Upon examination, pt presents with impairments of decreased strength, decreased ROM, and increased pain in pt's R knee, as well as decreased strength in R hip and R ankle  Pt's impairments are resulting in limitations of self-care/ADLs, household activities, stair negotiation, and ambulation  PT POC will focus on decreasing stiffness in R patella and knee, as well as increasing strength of RLE to improve R knee stability and tolerance to activity  Pt would benefit from skilled physical therapy to facilitate a return to her prior level of function  Impairments: abnormal or restricted ROM, activity intolerance, impaired physical strength, lacks appropriate home exercise program, pain with function and poor body mechanics  Functional limitations: self-care/ADLs, household activities, ambulation, stair negotiation  Symptom irritability: moderateUnderstanding of Dx/Px/POC: good   Prognosis: good    Goals  STG - 4 weeks  1  Pt will have subjective decrease in pain of her R knee by 2 levels or more during ambulation  2  Pt will demonstrate R knee flexion ROM to 105 degrees or better to facilitate performance of ADLs  LTG - 8 weeks  1  Pt will demonstrate improved R knee strength to 3+/5 or better in all planes to facilitate performance of ADLs  2  Pt's FOTO score will improve from 21 to 45 or better to facilitate a return to pt's prior level of function      Plan  Patient would benefit from: PT eval and skilled physical therapy  Planned modality interventions: cryotherapy, thermotherapy: hydrocollator packs and unattended electrical stimulation  Planned therapy interventions: activity modification, ADL training, balance, body mechanics training, flexibility, functional ROM exercises, gait training, graded exercise, home exercise program, joint mobilization, manual therapy, massage, Fuchs taping, neuromuscular re-education, patient education, self care, stretching, strengthening, therapeutic activities and therapeutic exercise  Frequency: 1x week  Duration in weeks: 8  Plan of Care beginning date: 3/21/2023  Plan of Care expiration date: 2023  Treatment plan discussed with: patient        Subjective Evaluation    History of Present Illness  Mechanism of injury: Pt reports R knee pain since 2022, her R knee gave out and she fell under her car  Pt referred for physical therapy last year, attended a few sessions and had continued pain in R knee, had to stop due to addressing other medical needs  Pt reports her knee is okay most of the time while walking, but it sometimes gives out on her  Pt also reports it feels like it needs to pop  Pt reports difficulty sleeping due to pain with laying on her R side  Pt reports trying to avoid stairs because feeling like she needs a lot of support  Pt reports pain and difficulty stretching her knee and difficulty taking a bath due to the sitting position  Pt reports a lot of pain with trying to squat down for something   Pt reports constant pain unless she takes some pain medications, which gives some partial relief and allows her to rest    Pain  Current pain ratin  At best pain ratin  At worst pain rating: 10  Location: B joint lines  Quality: throbbing and pressure  Relieving factors: medications  Progression: worsening    Patient Goals  Patient goals for therapy: decreased pain  Patient goal: Get some relief and be able to walk better        Objective     Palpation     Right   Tenderness of the TFL  Tenderness     Right Knee   Tenderness in the lateral joint line, medial joint line and patellar tendon  Active Range of Motion   Left Knee   Normal active range of motion    Right Knee   Flexion: 50 degrees with pain  Extension: -8 degrees with pain    Passive Range of Motion     Right Knee   Flexion: 65 degrees   Extension: -5 degrees with pain    Additional Passive Range of Motion Details  Pt able to get full revolutions on recumbent bike    Mobility   Patellar Mobility:     Right Knee   Hypomobile: medial, lateral, superior and inferior     Strength/Myotome Testing     Right Hip   Planes of Motion   Flexion: 2+  Extension: 2+  Abduction: 2+  Adduction: 2+  External rotation: 2+  Internal rotation: 2+    Left Knee   Normal strength    Right Knee   Flexion: 2+  Extension: 2+    Right Ankle/Foot   Dorsiflexion: 2+  Plantar flexion: 2+    Additional Strength Details  DF and PF 3+/5 in midrange    Ambulation     Observational Gait   Gait: antalgic   Decreased walking speed, stride length and right stance time       General Comments:      Knee Comments  Pt demonstrates an antalgic gait pattern with decrease step length and weightbearing of her RLE             Precautions: Fibromyalgia, Diabetes      Manuals 3/21            PROM of R knee 5'            R patella mobs 3'                                      Neuro Re-Ed             Education on POC, diagnosis, and HEP 8'            Quad sets 1x10 5"                                                                             Ther Ex             Heel slides NV            Recumbent bike for ROM 3'                                                                             Ther Activity                                       Gait Training                                       Modalities

## 2023-03-28 ENCOUNTER — OFFICE VISIT (OUTPATIENT)
Dept: PHYSICAL THERAPY | Facility: CLINIC | Age: 48
End: 2023-03-28

## 2023-03-28 DIAGNOSIS — G89.29 CHRONIC PAIN OF RIGHT KNEE: Primary | ICD-10-CM

## 2023-03-28 DIAGNOSIS — M79.7 FIBROMYALGIA: ICD-10-CM

## 2023-03-28 DIAGNOSIS — M25.561 CHRONIC PAIN OF RIGHT KNEE: Primary | ICD-10-CM

## 2023-03-28 NOTE — PROGRESS NOTES
"Daily Note     Today's date: 3/28/2023  Patient name: Magali James  : 1975  MRN: [id number]  Referring provider: Gris Cates DO  Dx:   Encounter Diagnosis     ICD-10-CM    1  Chronic pain of right knee  M25 561     G89 29       2  Fibromyalgia  M79 7           Start Time: 930  Stop Time: 1013  Total time in clinic (min): 43 minutes    Subjective: Pt reports she has been active and went to the gym multiple times since her initial visit  Pt reports some continued soreness in R knee, but has been moving it more and has had some less stiffness  Objective: See treatment diary below      Assessment: Tolerated treatment well  Patient demonstrated fatigue post treatment, exhibited good technique with therapeutic exercises and would benefit from continued PT  Pt program progressed with additional ROM and stabilization exercises of R knee  Pt tolerated progressions well, occasional complaints of stiffness or pressure but no lasting discomfort noted  Encouraged pt to continue to go to gym and to stretch and warm up properly  Will assess response to treatment this visit and will progress next visit as tolerated  Plan: Continue per plan of care  Progress treatment as tolerated         Precautions: Fibromyalgia, Diabetes      Manuals 3/21 3/28           PROM R knee flex 5' 5'           R patella mobs 3' 3'                                     Neuro Re-Ed             Pt education 8' 4'           Quad sets 1x10 5\" 1x10 5\"           SAQ  2x10 3\"                                                               Ther Ex             Heel slides c strap NV 2x10 5\"           Recumbent bike for ROM 3' 5'           Seated HS curls  1x15 grn           Prone quad stretch  5x20\"           LAQ  1x15                        Ther Activity             SL leg press  1x15 15#           Minisquats at bar  1x10           Gait Training                                       Modalities                                            "

## 2023-04-04 ENCOUNTER — APPOINTMENT (OUTPATIENT)
Dept: LAB | Facility: HOSPITAL | Age: 48
End: 2023-04-04

## 2023-04-04 DIAGNOSIS — E11.69 HYPERLIPIDEMIA ASSOCIATED WITH TYPE 2 DIABETES MELLITUS (HCC): ICD-10-CM

## 2023-04-04 DIAGNOSIS — Z79.4 TYPE 2 DIABETES MELLITUS WITH DIABETIC POLYNEUROPATHY, WITH LONG-TERM CURRENT USE OF INSULIN (HCC): ICD-10-CM

## 2023-04-04 DIAGNOSIS — E11.42 TYPE 2 DIABETES MELLITUS WITH DIABETIC POLYNEUROPATHY, WITH LONG-TERM CURRENT USE OF INSULIN (HCC): ICD-10-CM

## 2023-04-04 DIAGNOSIS — E78.5 HYPERLIPIDEMIA ASSOCIATED WITH TYPE 2 DIABETES MELLITUS (HCC): ICD-10-CM

## 2023-04-04 LAB
CHOLEST SERPL-MCNC: 188 MG/DL
CREAT UR-MCNC: 212 MG/DL
HDLC SERPL-MCNC: 48 MG/DL
LDLC SERPL CALC-MCNC: 99 MG/DL (ref 0–100)
MICROALBUMIN UR-MCNC: 28 MG/L (ref 0–20)
MICROALBUMIN/CREAT 24H UR: 13 MG/G CREATININE (ref 0–30)
NONHDLC SERPL-MCNC: 140 MG/DL
TRIGL SERPL-MCNC: 206 MG/DL

## 2023-04-07 DIAGNOSIS — Z79.4 TYPE 2 DIABETES MELLITUS WITH DIABETIC POLYNEUROPATHY, WITH LONG-TERM CURRENT USE OF INSULIN (HCC): ICD-10-CM

## 2023-04-07 DIAGNOSIS — E11.42 TYPE 2 DIABETES MELLITUS WITH DIABETIC POLYNEUROPATHY, WITH LONG-TERM CURRENT USE OF INSULIN (HCC): ICD-10-CM

## 2023-04-11 ENCOUNTER — RX ONLY (RX ONLY)
Age: 48
End: 2023-04-11

## 2023-04-11 ENCOUNTER — DOCTOR'S OFFICE (OUTPATIENT)
Dept: URBAN - NONMETROPOLITAN AREA CLINIC 1 | Facility: CLINIC | Age: 48
Setting detail: OPHTHALMOLOGY
End: 2023-04-11
Payer: COMMERCIAL

## 2023-04-11 DIAGNOSIS — H25.043: ICD-10-CM

## 2023-04-11 DIAGNOSIS — H25.013: ICD-10-CM

## 2023-04-11 DIAGNOSIS — H25.13: ICD-10-CM

## 2023-04-11 DIAGNOSIS — E11.9: ICD-10-CM

## 2023-04-11 DIAGNOSIS — H53.10: ICD-10-CM

## 2023-04-11 PROCEDURE — 99204 OFFICE O/P NEW MOD 45 MIN: CPT

## 2023-04-11 PROCEDURE — 92083 EXTENDED VISUAL FIELD XM: CPT

## 2023-04-11 ASSESSMENT — SPHEQUIV_DERIVED
OD_SPHEQUIV: -7.125
OD_SPHEQUIV: -7.625
OS_SPHEQUIV: -9.375
OS_SPHEQUIV: -8.875

## 2023-04-11 ASSESSMENT — REFRACTION_CURRENTRX
OS_VPRISM_DIRECTION: SV
OS_SPHERE: -6.50
OD_VPRISM_DIRECTION: SV
OD_SPHERE: -5.25
OD_AXIS: 001
OS_AXIS: 162
OS_OVR_VA: 20/
OS_CYLINDER: -3.75
OD_OVR_VA: 20/
OD_CYLINDER: -3.25

## 2023-04-11 ASSESSMENT — REFRACTION_AUTOREFRACTION
OS_CYLINDER: -4.25
OD_CYLINDER: -2.75
OS_SPHERE: -7.25
OD_AXIS: 176
OS_AXIS: 162
OD_SPHERE: -5.75

## 2023-04-11 ASSESSMENT — VISUAL ACUITY
OS_BCVA: 20/30
OD_BCVA: 20/60-2

## 2023-04-11 ASSESSMENT — REFRACTION_MANIFEST
OS_AXIS: 160
OS_VA2: OU
OD_VA1: 20/30
OD_VA2: 20/20
OD_AXIS: 005
OD_ADD: +1.50
OS_SPHERE: -7.25
OD_CYLINDER: -3.25
OS_CYLINDER: -3.25
OS_VA1: 20/40-
OS_ADD: +1.50
OD_SPHERE: -6.00

## 2023-04-11 ASSESSMENT — CONFRONTATIONAL VISUAL FIELD TEST (CVF)
OS_FINDINGS: FULL
OD_FINDINGS: FULL

## 2023-05-15 DIAGNOSIS — E11.42 TYPE 2 DIABETES MELLITUS WITH DIABETIC POLYNEUROPATHY, WITH LONG-TERM CURRENT USE OF INSULIN (HCC): ICD-10-CM

## 2023-05-15 DIAGNOSIS — Z79.4 TYPE 2 DIABETES MELLITUS WITH DIABETIC POLYNEUROPATHY, WITH LONG-TERM CURRENT USE OF INSULIN (HCC): ICD-10-CM

## 2023-05-15 NOTE — TELEPHONE ENCOUNTER
Pt stopped by the office - previously requested refill for  Empagliflozin 25 MG TABS never received / picked up at the pharmacy - pt our of meds completely (pt away for 18 days)       Please approve or refuse refill request

## 2023-05-19 ENCOUNTER — OFFICE VISIT (OUTPATIENT)
Dept: URGENT CARE | Facility: CLINIC | Age: 48
End: 2023-05-19

## 2023-05-19 VITALS
HEART RATE: 85 BPM | SYSTOLIC BLOOD PRESSURE: 102 MMHG | OXYGEN SATURATION: 98 % | TEMPERATURE: 97.4 F | WEIGHT: 158 LBS | RESPIRATION RATE: 18 BRPM | BODY MASS INDEX: 27.12 KG/M2 | DIASTOLIC BLOOD PRESSURE: 69 MMHG

## 2023-05-19 DIAGNOSIS — K52.9 GASTROENTERITIS: Primary | ICD-10-CM

## 2023-05-19 DIAGNOSIS — R51.9 NONINTRACTABLE HEADACHE, UNSPECIFIED CHRONICITY PATTERN, UNSPECIFIED HEADACHE TYPE: ICD-10-CM

## 2023-05-19 DIAGNOSIS — J02.9 ACUTE PHARYNGITIS, UNSPECIFIED ETIOLOGY: ICD-10-CM

## 2023-05-19 LAB
S PYO AG THROAT QL: NEGATIVE
SARS-COV-2 AG UPPER RESP QL IA: NEGATIVE
VALID CONTROL: NORMAL

## 2023-05-19 RX ORDER — ONDANSETRON 4 MG/1
4 TABLET, FILM COATED ORAL EVERY 8 HOURS PRN
Qty: 20 TABLET | Refills: 0 | Status: SHIPPED | OUTPATIENT
Start: 2023-05-19

## 2023-05-19 NOTE — PATIENT INSTRUCTIONS
Gastroenteritis  Brat diet, increase fluid intake  Zofran as needed for nausea  Follow up with PCP in 3-5 days  Proceed to  ER if symptoms worsen

## 2023-05-19 NOTE — PROGRESS NOTES
[address]        NAME: Earl Lemus is a 56 y o  female  : 1975    MRN: [id number]  DATE: May 19, 2023  TIME: 11:21 AM    Assessment and Plan   Gastroenteritis [K52 9]  1  Gastroenteritis        2  Nonintractable headache, unspecified chronicity pattern, unspecified headache type              Patient Instructions     Gastroenteritis  Brat diet, increase fluid intake  Zofran as needed for nausea  Follow up with PCP in 3-5 days  Proceed to  ER if symptoms worsen  Chief Complaint     Chief Complaint   Patient presents with   • Abdominal Pain     Pt, states she has and a migraine and diarrhea for 10 days  Pt is able to keep food down but is having nausea  History of Present Illness       46-year-old female who presents complaining of nausea, crampy abdominal pain, watery brown diarrhea (no blood, no mucus) x10 days  Patient states that she has developed a headache  States that she has had similar symptoms in the past with previous migraine episodes and she has been taking her Topamax with minimal relief  States she felt chills but did not recorded temperature  Patient states that she is concerned about possible parasites as she has travel outside the country  Review of Systems   Review of Systems   Constitutional: Positive for fever  Negative for activity change, appetite change, chills, diaphoresis and fatigue  HENT: Positive for congestion, ear pain, rhinorrhea and sore throat  Negative for ear discharge, facial swelling, hearing loss, mouth sores, nosebleeds, postnasal drip, sinus pressure, sinus pain, sneezing and voice change  Respiratory: Positive for cough  Negative for apnea, choking, chest tightness, shortness of breath, wheezing and stridor  Cardiovascular: Negative  Gastrointestinal: Positive for diarrhea and nausea  Negative for abdominal distention, abdominal pain, anal bleeding, blood in stool, constipation, rectal pain and vomiting           Current Medications       Current Outpatient Medications:   •  albuterol (PROVENTIL HFA,VENTOLIN HFA) 90 mcg/act inhaler, Inhale 1 puff every 4 (four) hours as needed for wheezing, Disp: 6 7 g, Rfl: 2  •  amitriptyline (ELAVIL) 100 mg tablet, Take 1 tablet (100 mg total) by mouth daily at bedtime, Disp: 90 tablet, Rfl: 0  •  amitriptyline (ELAVIL) 50 mg tablet, Take 50 mg by mouth daily at bedtime, Disp: , Rfl:   •  ARIPiprazole (ABILIFY) 2 mg tablet, Take 2 mg by mouth every morning, Disp: , Rfl:   •  atorvastatin (LIPITOR) 80 mg tablet, Take 1 tablet (80 mg total) by mouth daily at bedtime, Disp: 90 tablet, Rfl: 0  •  Blood Glucose Monitoring Suppl (ONE TOUCH ULTRA 2) w/Device KIT, Inject into the skin in the morning As directed, Disp: 1 kit, Rfl: 0  •  Continuous Blood Gluc  (FreeStyle Elaina 2 [place]) [facility], Use to monitor blood sugar 4 times per day , Disp: 1 each, Rfl: 0  •  Continuous Blood Gluc Sensor (FreeStyle Elaina 14 Day Sensor) [facility], Use to monitor blood sugar 4 times daily for 14 days and then replace  , Disp: 2 each, Rfl: 0  •  Continuous Blood Gluc Sensor (FreeStyle Elaina 2 Sensor) MISC, USE TO CHECK BLOOD SUGAR 4 TIMES DAILY REPLACE  EVERY  14  DAYS, Disp: 2 each, Rfl: 0  •  Diclofenac Sodium (VOLTAREN) 1 %, Apply 2 g topically 4 (four) times a day, Disp: 50 g, Rfl: 1  •  dicyclomine (BENTYL) 20 mg tablet, Take 1 tablet (20 mg total) by mouth every 6 (six) hours as needed (abdominal pain), Disp: 60 tablet, Rfl: 3  •  Empagliflozin 25 MG TABS, Take 1 tablet (25 mg total) by mouth every morning, Disp: 90 tablet, Rfl: 1  •  gabapentin (NEURONTIN) 300 mg capsule, Take 1 capsule (300 mg total) by mouth 4 (four) times a day, Disp: 120 capsule, Rfl: 2  •  glucose blood test strip, Check blood sugar four times at day , Disp: 100 strip, Rfl: 1  •  hydrocortisone 2 5 % cream, Apply topically 3 (three) times a day, Disp: 30 g, Rfl: 0  •  Insulin Glargine Solostar (Lantus SoloStar) 100 UNIT/ML SOPN, Inject 0 35 mL (35 Units total) under the skin daily at bedtime (Patient taking differently: Inject 50 Units under the skin in the morning), Disp: 15 mL, Rfl: 2  •  insulin lispro (HumaLOG KwikPen) 100 units/mL injection pen, Inject 5 Units under the skin 3 (three) times a day with meals, Disp: 15 mL, Rfl: 0  •  Insulin Pen Needle (MM Pen Needles) 31G X 8 MM MISC, Use as directed , Disp: 100 each, Rfl: 0  •  Lancets (onetouch ultrasoft) lancets, Check blood sugar four times at day , Disp: 100 each, Rfl: 1  •  metFORMIN (GLUCOPHAGE) 1000 MG tablet, Take 1 tablet (1,000 mg total) by mouth 2 (two) times a day with meals, Disp: 60 tablet, Rfl: 2  •  montelukast (SINGULAIR) 10 mg tablet, Take 1 tablet (10 mg total) by mouth daily at bedtime, Disp: 30 tablet, Rfl: 2  •  topiramate (Topamax) 50 MG tablet, Take 1 tablet (50 mg total) by mouth 2 (two) times a day, Disp: 60 tablet, Rfl: 2  •  traZODone (DESYREL) 50 mg tablet, Take 50 mg by mouth daily at bedtime, Disp: , Rfl:   •  triamcinolone (KENALOG) 0 1 % ointment, Apply topically 2 (two) times a day, Disp: 30 g, Rfl: 0  •  colestipol (COLESTID) 1 g tablet, Take 1 tablet (1 g total) by mouth 3 (three) times a day (Patient not taking: Reported on 5/19/2023), Disp: 90 tablet, Rfl: 3  •  diphenoxylate-atropine (LOMOTIL) 2 5-0 025 mg per tablet, TAKE 1 TABLET BY MOUTH IN THE MORNING AND 1 AT NOON AND 1 IN THE EVENING AND 1 AT BEDTIME (Patient not taking: Reported on 4/10/2023), Disp: , Rfl:     Current Allergies     Allergies as of 05/19/2023   • (No Known Allergies)            The following portions of the patient's history were reviewed and updated as appropriate: allergies, current medications, past family history, past medical history, past social history, past surgical history and problem list      Past Medical History:   Diagnosis Date   • Asthma    • Colon polyp    • [facility]    • Fibromyalgia    • Hyperlipidemia    • Varicella        Past Surgical History: Procedure Laterality Date   •  SECTION     • CHOLECYSTECTOMY     • COLONOSCOPY N/A 10/24/2018    Procedure: COLONOSCOPY;  Surgeon: Dulce Platt MD;  Location: [facility]; Service: Gastroenterology   • ESOPHAGOGASTRODUODENOSCOPY N/A 10/26/2018    Procedure: ESOPHAGOGASTRODUODENOSCOPY (EGD); Surgeon: Dulce Platt MD;  Location: [facility]; Service: Gastroenterology   • TUBAL LIGATION         Family History   Problem Relation Age of Onset   • Diabetes Mother    • No Known Problems Sister    • No Known Problems Sister    • No Known Problems Maternal Aunt    • No Known Problems Maternal Aunt    • No Known Problems Maternal Aunt    • No Known Problems Maternal Aunt    • No Known Problems Maternal Aunt    • Cancer Maternal Aunt    • Breast cancer Cousin 44   • Prostate cancer Maternal Uncle    • Prostate cancer Maternal Uncle    • Cancer Maternal Uncle          Medications have been verified  Objective   /69   Pulse 85   Temp (!) 97 4 °F (36 3 °C)   Resp 18   Wt 71 7 kg (158 lb)   SpO2 98%   BMI 27 12 kg/m²        Physical Exam     Physical Exam  Constitutional:       Appearance: She is well-developed  HENT:      Head: Normocephalic and atraumatic  Right Ear: External ear normal       Left Ear: External ear normal       Nose: Nose normal       Mouth/Throat:      Pharynx: No oropharyngeal exudate  Cardiovascular:      Rate and Rhythm: Normal rate and regular rhythm  Heart sounds: Normal heart sounds  Pulmonary:      Effort: Pulmonary effort is normal  No respiratory distress  Breath sounds: Normal breath sounds  No wheezing or rales  Chest:      Chest wall: No tenderness  Abdominal:      General: Bowel sounds are normal  There is no distension  Palpations: Abdomen is soft  There is no mass  Tenderness: There is generalized abdominal tenderness  There is no right CVA tenderness, left CVA tenderness, guarding or rebound   Negative signs include Guevara's sign and McBurney's sign  Musculoskeletal:      Cervical back: Normal range of motion and neck supple  Lymphadenopathy:      Cervical: No cervical adenopathy

## 2023-06-22 DIAGNOSIS — Z79.4 TYPE 2 DIABETES MELLITUS WITH DIABETIC POLYNEUROPATHY, WITH LONG-TERM CURRENT USE OF INSULIN (HCC): ICD-10-CM

## 2023-06-22 DIAGNOSIS — E11.42 TYPE 2 DIABETES MELLITUS WITH DIABETIC POLYNEUROPATHY, WITH LONG-TERM CURRENT USE OF INSULIN (HCC): ICD-10-CM

## 2023-06-22 RX ORDER — INSULIN LISPRO 100 [IU]/ML
5 INJECTION, SOLUTION INTRAVENOUS; SUBCUTANEOUS
Qty: 15 ML | Refills: 0 | Status: SHIPPED | OUTPATIENT
Start: 2023-06-22

## 2023-06-29 DIAGNOSIS — Z79.4 TYPE 2 DIABETES MELLITUS WITH HYPERGLYCEMIA, WITH LONG-TERM CURRENT USE OF INSULIN (HCC): ICD-10-CM

## 2023-06-29 DIAGNOSIS — E11.65 TYPE 2 DIABETES MELLITUS WITH HYPERGLYCEMIA, WITH LONG-TERM CURRENT USE OF INSULIN (HCC): ICD-10-CM

## 2023-06-29 RX ORDER — FLASH GLUCOSE SENSOR
KIT MISCELLANEOUS
Qty: 2 EACH | Refills: 0 | Status: SHIPPED | OUTPATIENT
Start: 2023-06-29

## 2023-07-01 DIAGNOSIS — Z79.4 TYPE 2 DIABETES MELLITUS WITH DIABETIC POLYNEUROPATHY, WITH LONG-TERM CURRENT USE OF INSULIN (HCC): ICD-10-CM

## 2023-07-01 DIAGNOSIS — M79.7 FIBROMYALGIA: ICD-10-CM

## 2023-07-01 DIAGNOSIS — E11.42 TYPE 2 DIABETES MELLITUS WITH DIABETIC POLYNEUROPATHY, WITH LONG-TERM CURRENT USE OF INSULIN (HCC): ICD-10-CM

## 2023-07-01 RX ORDER — GABAPENTIN 300 MG/1
CAPSULE ORAL
Qty: 120 CAPSULE | Refills: 0 | Status: SHIPPED | OUTPATIENT
Start: 2023-07-01

## 2023-07-09 DIAGNOSIS — Z79.4 TYPE 2 DIABETES MELLITUS WITH DIABETIC POLYNEUROPATHY, WITH LONG-TERM CURRENT USE OF INSULIN (HCC): ICD-10-CM

## 2023-07-09 DIAGNOSIS — E11.42 TYPE 2 DIABETES MELLITUS WITH DIABETIC POLYNEUROPATHY, WITH LONG-TERM CURRENT USE OF INSULIN (HCC): ICD-10-CM

## 2023-07-10 RX ORDER — PEN NEEDLE, DIABETIC 31 GX5/16"
NEEDLE, DISPOSABLE MISCELLANEOUS
Qty: 100 EACH | Refills: 0 | Status: SHIPPED | OUTPATIENT
Start: 2023-07-10

## 2023-07-11 ENCOUNTER — OFFICE VISIT (OUTPATIENT)
Dept: FAMILY MEDICINE CLINIC | Facility: CLINIC | Age: 48
End: 2023-07-11
Payer: COMMERCIAL

## 2023-07-11 ENCOUNTER — TELEPHONE (OUTPATIENT)
Dept: NEUROLOGY | Facility: CLINIC | Age: 48
End: 2023-07-11

## 2023-07-11 VITALS
HEART RATE: 78 BPM | SYSTOLIC BLOOD PRESSURE: 122 MMHG | WEIGHT: 162 LBS | BODY MASS INDEX: 27.66 KG/M2 | DIASTOLIC BLOOD PRESSURE: 78 MMHG | HEIGHT: 64 IN | TEMPERATURE: 97.8 F | OXYGEN SATURATION: 99 %

## 2023-07-11 DIAGNOSIS — Z79.4 TYPE 2 DIABETES MELLITUS WITH HYPERGLYCEMIA, WITH LONG-TERM CURRENT USE OF INSULIN (HCC): ICD-10-CM

## 2023-07-11 DIAGNOSIS — J30.2 SEASONAL ALLERGIES: ICD-10-CM

## 2023-07-11 DIAGNOSIS — J45.40 MODERATE PERSISTENT ASTHMA WITHOUT COMPLICATION: ICD-10-CM

## 2023-07-11 DIAGNOSIS — R41.3 MEMORY DIFFICULTY: ICD-10-CM

## 2023-07-11 DIAGNOSIS — M17.0 OSTEOARTHRITIS OF BOTH KNEES, UNSPECIFIED OSTEOARTHRITIS TYPE: ICD-10-CM

## 2023-07-11 DIAGNOSIS — E11.65 TYPE 2 DIABETES MELLITUS WITH HYPERGLYCEMIA, WITH LONG-TERM CURRENT USE OF INSULIN (HCC): ICD-10-CM

## 2023-07-11 DIAGNOSIS — Z79.4 TYPE 2 DIABETES MELLITUS WITH DIABETIC POLYNEUROPATHY, WITH LONG-TERM CURRENT USE OF INSULIN (HCC): Primary | ICD-10-CM

## 2023-07-11 DIAGNOSIS — G43.019 INTRACTABLE MIGRAINE WITHOUT AURA AND WITHOUT STATUS MIGRAINOSUS: ICD-10-CM

## 2023-07-11 DIAGNOSIS — E11.42 TYPE 2 DIABETES MELLITUS WITH DIABETIC POLYNEUROPATHY, WITH LONG-TERM CURRENT USE OF INSULIN (HCC): Primary | ICD-10-CM

## 2023-07-11 DIAGNOSIS — M79.7 FIBROMYALGIA: ICD-10-CM

## 2023-07-11 DIAGNOSIS — N90.89 VULVAR IRRITATION: ICD-10-CM

## 2023-07-11 LAB — SL AMB POCT HEMOGLOBIN AIC: 6 (ref ?–6.5)

## 2023-07-11 PROCEDURE — 99214 OFFICE O/P EST MOD 30 MIN: CPT | Performed by: FAMILY MEDICINE

## 2023-07-11 PROCEDURE — 83036 HEMOGLOBIN GLYCOSYLATED A1C: CPT | Performed by: FAMILY MEDICINE

## 2023-07-11 RX ORDER — MONTELUKAST SODIUM 10 MG/1
10 TABLET ORAL
Qty: 30 TABLET | Refills: 2 | Status: SHIPPED | OUTPATIENT
Start: 2023-07-11

## 2023-07-11 RX ORDER — TOPIRAMATE 50 MG/1
50 TABLET, FILM COATED ORAL 2 TIMES DAILY
Qty: 60 TABLET | Refills: 2 | Status: SHIPPED | OUTPATIENT
Start: 2023-07-11

## 2023-07-11 RX ORDER — INSULIN GLARGINE 100 [IU]/ML
45 INJECTION, SOLUTION SUBCUTANEOUS
Qty: 42 ML | Refills: 1 | Status: SHIPPED | OUTPATIENT
Start: 2023-07-11

## 2023-07-11 RX ORDER — FLASH GLUCOSE SENSOR
KIT MISCELLANEOUS
Qty: 2 EACH | Refills: 0 | OUTPATIENT
Start: 2023-07-11

## 2023-07-11 NOTE — PROGRESS NOTES
Name: Tyrese Grimaldo      : 1975      MRN: [id number]  Encounter Provider: Paris Angel DO  Encounter Date: 2023   Encounter department: [address] [place] [address] [facility]     1. Type 2 diabetes mellitus with diabetic polyneuropathy, with long-term current use of insulin ([facility])  Hgb A1c of 6.0, decrease to 45 units Lantus. If low blood sugars continue, will D/C lunch time dosing of 5 units.  -     POCT hemoglobin A1c  -     IRIS Diabetic eye exam  -     Ambulatory Referral to Neurology; Future  -     Insulin Glargine Solostar (Lantus SoloStar) 100 UNIT/ML SOPN; Inject 0.45 mL (45 Units total) under the skin daily at bedtime    2. Vulvar irritation  -     triamcinolone (KENALOG) 0.1 % ointment; Apply topically 2 (two) times a day    3. Moderate persistent asthma without complication  -     montelukast (SINGULAIR) 10 mg tablet; Take 1 tablet (10 mg total) by mouth daily at bedtime    4. Seasonal allergies  -     montelukast (SINGULAIR) 10 mg tablet; Take 1 tablet (10 mg total) by mouth daily at bedtime    5. Intractable migraine without aura and without status migrainosus  -     topiramate (Topamax) 50 MG tablet; Take 1 tablet (50 mg total) by mouth 2 (two) times a day    6. Fibromyalgia    7. Memory difficulty  -     Ambulatory Referral to Neurology; Future    8. Osteoarthritis of both knees, unspecified osteoarthritis type  -     Ambulatory Referral to Orthopedic Surgery; Future  -     Diclofenac Sodium (VOLTAREN) 1 %; Apply 2 g topically 4 (four) times a day      BMI Counseling: Body mass index is 27.81 kg/m². The BMI is above normal. Nutrition recommendations include decreasing portion sizes, encouraging healthy choices of fruits and vegetables, consuming healthier snacks, limiting drinks that contain sugar, moderation in carbohydrate intake, increasing intake of lean protein and reducing intake of cholesterol.  Exercise recommendations include moderate physical activity 150 minutes/week and exercising 3-5 times per week. No pharmacotherapy was ordered. Rationale for BMI follow-up plan is due to patient being overweight or obese. Depression Screening and Follow-up Plan: Patient's depression screening was positive with a PHQ-2 score of 6. Their PHQ-9 score was 23. Continue regular follow-up with their mental health provider who is managing their mental health condition(s). Subjective      HPI     Patient presents to the office for follow up. Notes that she has been having some low blood sugars. Almost one per day. Typically in the afternoon. Notes that she is having some issues with sleep, sleeping about 4 hours during the night. Not napping during the day, reports that she wants to but cant fall asleep. Reports that she is twisting and turning a lot at night. Reports that she finds that she loses her balance at times, more easily. Reports that she was previously doing cortisone shots in both knees and both wrists, was told that she has arthritis. PHQ-2/9 Depression Screening    Little interest or pleasure in doing things: 3 - nearly every day  Feeling down, depressed, or hopeless: 3 - nearly every day  Trouble falling or staying asleep, or sleeping too much: 3 - nearly every day  Feeling tired or having little energy: 3 - nearly every day  Poor appetite or overeatin - more than half the days  Feeling bad about yourself - or that you are a failure or have let yourself or your family down: 3 - nearly every day  Trouble concentrating on things, such as reading the newspaper or watching television: 3 - nearly every day  Moving or speaking so slowly that other people could have noticed.  Or the opposite - being so fidgety or restless that you have been moving around a lot more than usual: 3 - nearly every day  Thoughts that you would be better off dead, or of hurting yourself in some way: 0 - not at all  PHQ-2 Score: 6  PHQ-2 Interpretation: POSITIVE depression screen  PHQ-9 Score: 23   PHQ-9 Interpretation: Severe depression        States that she is following with [facility] for mental health. Notes that her mental health decreased last week due to [id number] denial. Reports that she is working with a . Review of Systems    Current Outpatient Medications on File Prior to Visit   Medication Sig   • albuterol (PROVENTIL HFA,VENTOLIN HFA) 90 mcg/act inhaler Inhale 1 puff every 4 (four) hours as needed for wheezing   • amitriptyline (ELAVIL) 50 mg tablet Take 50 mg by mouth daily at bedtime   • ARIPiprazole (ABILIFY) 2 mg tablet Take 2 mg by mouth every morning   • atorvastatin (LIPITOR) 80 mg tablet Take 1 tablet (80 mg total) by mouth daily at bedtime   • Blood Glucose Monitoring Suppl (ONE TOUCH ULTRA 2) w/Device KIT Inject into the skin in the morning As directed   • Continuous Blood Gluc  (FreeStyle Elaina 2 Yulan) BENEDICT Use to monitor blood sugar 4 times per day. • Continuous Blood Gluc Sensor (FreeStyle Elaina 14 Day Sensor) MISC USE  TO MONITOR  GLUCOSE 4 TIMES DAILY FOR 14 DAYS THEN REPLACE   • Continuous Blood Gluc Sensor (FreeStyle Elaina 2 Sensor) MISC USE TO CHECK BLOOD SUGAR 4 TIMES DAILY REPLACE  EVERY  14  DAYS   • dicyclomine (BENTYL) 20 mg tablet Take 1 tablet (20 mg total) by mouth every 6 (six) hours as needed (abdominal pain)   • Empagliflozin 25 MG TABS Take 1 tablet (25 mg total) by mouth every morning   • gabapentin (NEURONTIN) 300 mg capsule Take 1 capsule by mouth 4 times daily   • glucose blood test strip Check blood sugar four times at day. • insulin lispro (HumaLOG KwikPen) 100 units/mL injection pen Inject 5 Units under the skin 3 (three) times a day with meals   • Insulin Pen Needle (B-D ULTRAFINE III SHORT PEN) 31G X 8 MM MISC USE AS DIRECTED   • Lancets (onetouch ultrasoft) lancets Check blood sugar four times at day.    • metFORMIN (GLUCOPHAGE) 1000 MG tablet Take 1 tablet (1,000 mg total) by mouth 2 (two) times a day with meals   • traZODone (DESYREL) 50 mg tablet Take 50 mg by mouth daily at bedtime   • [DISCONTINUED] Diclofenac Sodium (VOLTAREN) 1 % Apply 2 g topically 4 (four) times a day   • [DISCONTINUED] montelukast (SINGULAIR) 10 mg tablet Take 1 tablet (10 mg total) by mouth daily at bedtime   • [DISCONTINUED] topiramate (Topamax) 50 MG tablet Take 1 tablet (50 mg total) by mouth 2 (two) times a day   • [DISCONTINUED] triamcinolone (KENALOG) 0.1 % ointment Apply topically 2 (two) times a day   • colestipol (COLESTID) 1 g tablet Take 1 tablet (1 g total) by mouth 3 (three) times a day (Patient not taking: Reported on 5/19/2023)   • diphenoxylate-atropine (LOMOTIL) 2.5-0.025 mg per tablet TAKE 1 TABLET BY MOUTH IN THE MORNING AND 1 AT NOON AND 1 IN THE EVENING AND 1 AT BEDTIME (Patient not taking: Reported on 4/10/2023)   • hydrocortisone 2.5 % cream Apply topically 3 (three) times a day (Patient not taking: Reported on 7/11/2023)   • ondansetron (ZOFRAN) 4 mg tablet Take 1 tablet (4 mg total) by mouth every 8 (eight) hours as needed for nausea or vomiting (Patient not taking: Reported on 7/11/2023)   • [DISCONTINUED] amitriptyline (ELAVIL) 100 mg tablet Take 1 tablet (100 mg total) by mouth daily at bedtime   • [DISCONTINUED] Insulin Glargine Solostar (Lantus SoloStar) 100 UNIT/ML SOPN Inject 0.35 mL (35 Units total) under the skin daily at bedtime (Patient taking differently: Inject 50 Units under the skin in the morning)     Objective     /78 (BP Location: Left arm, Patient Position: Sitting, Cuff Size: Adult)   Pulse 78   Temp 97.8 °F (36.6 °C) (Tympanic)   Ht 5' 4" (1.626 m)   Wt 73.5 kg (162 lb)   SpO2 99%   BMI 27.81 kg/m²     Physical Exam  Vitals reviewed. Constitutional:       General: She is not in acute distress. Appearance: Normal appearance. HENT:      Head: Normocephalic and atraumatic.       Right Ear: External ear normal.      Left Ear: External ear normal. Nose: Nose normal.      Mouth/Throat:      Mouth: Mucous membranes are moist.   Eyes:      Extraocular Movements: Extraocular movements intact. Conjunctiva/sclera: Conjunctivae normal.   Cardiovascular:      Rate and Rhythm: Normal rate and regular rhythm. Pulses: no weak pulses          Dorsalis pedis pulses are 2+ on the right side and 2+ on the left side. Posterior tibial pulses are 2+ on the right side and 2+ on the left side. Heart sounds: Normal heart sounds. Pulmonary:      Effort: Pulmonary effort is normal.      Breath sounds: Normal breath sounds. No wheezing, rhonchi or rales. Abdominal:      General: Bowel sounds are normal. There is no distension. Palpations: Abdomen is soft. Tenderness: There is no abdominal tenderness. Musculoskeletal:      Right lower leg: No edema. Left lower leg: No edema. Feet:      Right foot:      Skin integrity: No ulcer, skin breakdown, erythema, warmth, callus or dry skin. Left foot:      Skin integrity: No ulcer, skin breakdown, erythema, warmth, callus or dry skin. Skin:     General: Skin is warm. Capillary Refill: Capillary refill takes less than 2 seconds. Findings: No rash. Neurological:      Mental Status: She is alert and oriented to person, place, and time. Mental status is at baseline. Cranial Nerves: No cranial nerve deficit. Diabetic Foot Exam    Patient's shoes and socks removed. Right Foot/Ankle   Right Foot Inspection  Skin Exam: skin normal and skin intact. No dry skin, no warmth, no callus, no erythema, no maceration, no abnormal color, no pre-ulcer, no ulcer and no callus. Toe Exam: ROM and strength within normal limits. Sensory   Vibration: absent  Proprioception: intact  Monofilament testing: absent    Vascular  Capillary refills: < 3 seconds  The right DP pulse is 2+. The right PT pulse is 2+.      Left Foot/Ankle  Left Foot Inspection  Skin Exam: skin normal and skin intact. No dry skin, no warmth, no erythema, no maceration, normal color, no pre-ulcer, no ulcer and no callus. Toe Exam: ROM and strength within normal limits. Sensory   Vibration: absent  Proprioception: intact  Monofilament testing: absent    Vascular  Capillary refills: < 3 seconds  The left DP pulse is 2+. The left PT pulse is 2+.      Assign Risk Category  No deformity present  Loss of protective sensation  No weak pulses  Risk: [facility] [place], DO

## 2023-07-12 ENCOUNTER — OFFICE VISIT (OUTPATIENT)
Dept: NEUROLOGY | Facility: CLINIC | Age: 48
End: 2023-07-12
Payer: COMMERCIAL

## 2023-07-12 VITALS
OXYGEN SATURATION: 99 % | DIASTOLIC BLOOD PRESSURE: 62 MMHG | HEART RATE: 79 BPM | HEIGHT: 64 IN | WEIGHT: 162 LBS | BODY MASS INDEX: 27.66 KG/M2 | SYSTOLIC BLOOD PRESSURE: 112 MMHG

## 2023-07-12 DIAGNOSIS — Z79.4 TYPE 2 DIABETES MELLITUS WITH DIABETIC POLYNEUROPATHY, WITH LONG-TERM CURRENT USE OF INSULIN (HCC): ICD-10-CM

## 2023-07-12 DIAGNOSIS — E11.42 TYPE 2 DIABETES MELLITUS WITH DIABETIC POLYNEUROPATHY, WITH LONG-TERM CURRENT USE OF INSULIN (HCC): ICD-10-CM

## 2023-07-12 DIAGNOSIS — G43.019 INTRACTABLE MIGRAINE WITHOUT AURA AND WITHOUT STATUS MIGRAINOSUS: ICD-10-CM

## 2023-07-12 PROCEDURE — 99215 OFFICE O/P EST HI 40 MIN: CPT | Performed by: PSYCHIATRY & NEUROLOGY

## 2023-07-12 RX ORDER — FREMANEZUMAB-VFRM 225 MG/1.5ML
225 INJECTION SUBCUTANEOUS
Qty: 1.5 ML | Refills: 3 | Status: SHIPPED | OUTPATIENT
Start: 2023-07-12

## 2023-07-12 RX ORDER — FLASH GLUCOSE SENSOR
KIT MISCELLANEOUS
Qty: 2 EACH | Refills: 0 | Status: SHIPPED | OUTPATIENT
Start: 2023-07-12

## 2023-07-12 NOTE — PROGRESS NOTES
Mann Mitchell is a 53 y.o. female. Chief Complaint   Patient presents with   • Intractable migraine without aura and without status migrain       Assessment:  1. Intractable migraine without aura and without status migrainosus    2. Type 2 diabetes mellitus with diabetic polyneuropathy, with long-term current use of insulin ([address])        Plan:  MRI scan of the brain. Blood work. Ajovy 225 mg subcutaneously every month. Follow-up in 3 to 4 months. Discussion:  Patient with intractable migraine headaches currently on Neurontin Topamax and amitriptyline also with history of diabetic neuropathy, would recommend an MRI scan of the brain and blood work to evaluate for the symptoms, also would recommend an EMG of bilateral lower extremities to evaluate for neuropathy, patient to call me after the above test to discuss the results, she was given a prescription for Ajovy to 25 mg subcu every 1 month, side effects discussed with the patient, she was advised to avoid migraine triggers which we discussed in detail. To take riboflavin 200 mg a day AND  magnesium 400 mg a day, side effects discussed with the patient, keep yourself well-hydrated, avoid excessive use of over-the-counter analgesics, to keep her blood pressure cholesterol and sugar under control, to go to the hospital if has any worsening symptoms and call me otherwise to see me back in 4 months and follow-up with the other physicians.     Subjective:    HPI   Patient is here accompanied with her  in follow-up for headaches and diabetic neuropathy, since her last visit according to the patient she was doing well for quite some time but for the last several months she has been having increasing headaches at least 2-3 times a week and sometimes does almost every day associated with photophobia phonophobia its mostly on the right side about 8-9 on 10 relieved with lying in a dark room, she also complains of numbness and tingling in both her feet, her last hemoglobin A1c was 6, she is on amitriptyline Topamax and Neurontin which is being managed by her family physician, no falls, no other complaints.     Vitals:    07/12/23 1424   BP: 112/62   BP Location: Right arm   Patient Position: Sitting   Cuff Size: Standard   Pulse: 79   SpO2: 99%   Weight: 73.5 kg (162 lb)   Height: 5' 4" (1.626 m)       Current Medications    Current Outpatient Medications:   •  albuterol (PROVENTIL HFA,VENTOLIN HFA) 90 mcg/act inhaler, Inhale 1 puff every 4 (four) hours as needed for wheezing, Disp: 6.7 g, Rfl: 2  •  amitriptyline (ELAVIL) 50 mg tablet, Take 50 mg by mouth daily at bedtime, Disp: , Rfl:   •  ARIPiprazole (ABILIFY) 2 mg tablet, Take 2 mg by mouth every morning, Disp: , Rfl:   •  atorvastatin (LIPITOR) 80 mg tablet, Take 1 tablet (80 mg total) by mouth daily at bedtime, Disp: 90 tablet, Rfl: 0  •  Blood Glucose Monitoring Suppl (ONE TOUCH ULTRA 2) w/Device KIT, Inject into the skin in the morning As directed, Disp: 1 kit, Rfl: 0  •  colestipol (COLESTID) 1 g tablet, Take 1 tablet (1 g total) by mouth 3 (three) times a day, Disp: 90 tablet, Rfl: 3  •  Continuous Blood Gluc  (FreeStyle Elaina 2 Helm) BENEDICT, Use to monitor blood sugar 4 times per day., Disp: 1 each, Rfl: 0  •  Continuous Blood Gluc Sensor (FreeStyle Elaina 14 Day Sensor) MISC, USE  TO MONITOR  GLUCOSE 4 TIMES DAILY FOR 14 DAYS THEN REPLACE, Disp: 2 each, Rfl: 0  •  Continuous Blood Gluc Sensor (FreeStyle Elaina 2 Sensor) MISC, USE TO CHECK BLOOD SUGAR 4 TIMES DAILY REPLACE  EVERY  14  DAYS, Disp: 2 each, Rfl: 0  •  Diclofenac Sodium (VOLTAREN) 1 %, Apply 2 g topically 4 (four) times a day, Disp: 50 g, Rfl: 1  •  dicyclomine (BENTYL) 20 mg tablet, Take 1 tablet (20 mg total) by mouth every 6 (six) hours as needed (abdominal pain), Disp: 60 tablet, Rfl: 3  •  Empagliflozin 25 MG TABS, Take 1 tablet (25 mg total) by mouth every morning, Disp: 90 tablet, Rfl: 1  •  gabapentin (NEURONTIN) 300 mg capsule, Take 1 capsule by mouth 4 times daily, Disp: 120 capsule, Rfl: 0  •  glucose blood test strip, Check blood sugar four times at day., Disp: 100 strip, Rfl: 1  •  hydrocortisone 2.5 % cream, Apply topically 3 (three) times a day, Disp: 30 g, Rfl: 0  •  Insulin Glargine Solostar (Lantus SoloStar) 100 UNIT/ML SOPN, Inject 0.45 mL (45 Units total) under the skin daily at bedtime, Disp: 42 mL, Rfl: 1  •  insulin lispro (HumaLOG KwikPen) 100 units/mL injection pen, Inject 5 Units under the skin 3 (three) times a day with meals, Disp: 15 mL, Rfl: 0  •  Insulin Pen Needle (B-D ULTRAFINE III SHORT PEN) 31G X 8 MM MISC, USE AS DIRECTED, Disp: 100 each, Rfl: 0  •  Lancets (onetouch ultrasoft) lancets, Check blood sugar four times at day., Disp: 100 each, Rfl: 1  •  metFORMIN (GLUCOPHAGE) 1000 MG tablet, Take 1 tablet (1,000 mg total) by mouth 2 (two) times a day with meals, Disp: 60 tablet, Rfl: 2  •  montelukast (SINGULAIR) 10 mg tablet, Take 1 tablet (10 mg total) by mouth daily at bedtime, Disp: 30 tablet, Rfl: 2  •  ondansetron (ZOFRAN) 4 mg tablet, Take 1 tablet (4 mg total) by mouth every 8 (eight) hours as needed for nausea or vomiting, Disp: 20 tablet, Rfl: 0  •  topiramate (Topamax) 50 MG tablet, Take 1 tablet (50 mg total) by mouth 2 (two) times a day, Disp: 60 tablet, Rfl: 2  •  traZODone (DESYREL) 50 mg tablet, Take 50 mg by mouth daily at bedtime, Disp: , Rfl:   •  triamcinolone (KENALOG) 0.1 % ointment, Apply topically 2 (two) times a day, Disp: 30 g, Rfl: 0  •  diphenoxylate-atropine (LOMOTIL) 2.5-0.025 mg per tablet, TAKE 1 TABLET BY MOUTH IN THE MORNING AND 1 AT NOON AND 1 IN THE EVENING AND 1 AT BEDTIME (Patient not taking: Reported on 4/10/2023), Disp: , Rfl:       Allergies  Patient has no known allergies.     Past Medical History  Past Medical History:   Diagnosis Date   • Asthma    • Bipolar 1 disorder ([address]) 12/2022   • Colon polyp    • Diabetes mellitus ([address])    • Fibromyalgia    • Hyperlipidemia    • Varicella          Past Surgical History:  Past Surgical History:   Procedure Laterality Date   •  SECTION     • CHOLECYSTECTOMY     • COLONOSCOPY N/A 10/24/2018    Procedure: COLONOSCOPY;  Surgeon: Shay Blevins MD;  Location: [facility]; Service: Gastroenterology   • ESOPHAGOGASTRODUODENOSCOPY N/A 10/26/2018    Procedure: ESOPHAGOGASTRODUODENOSCOPY (EGD); Surgeon: Shay Blevins MD;  Location: [facility]; Service: Gastroenterology   • TUBAL LIGATION           Family History:  Family History   Problem Relation Age of Onset   • Diabetes Mother    • No Known Problems Sister    • No Known Problems Sister    • No Known Problems Maternal Aunt    • No Known Problems Maternal Aunt    • No Known Problems Maternal Aunt    • No Known Problems Maternal Aunt    • No Known Problems Maternal Aunt    • Cancer Maternal Aunt    • Breast cancer Cousin 44   • Prostate cancer Maternal Uncle    • Prostate cancer Maternal Uncle    • Cancer Maternal Uncle        Social History:   reports that she has never smoked. She has never used smokeless tobacco. She reports that she does not currently use alcohol. She reports that she does not use drugs. I have reviewed the past medical history, surgical history, social and family history, current medications, allergies vitals, review of systems, and updated this information as appropriate today. Objective:    Physical Exam    Neurological Exam     GENERAL:  Cooperative in no acute distress. Well-developed and well-nourished     HEAD and NECK   Head is atraumatic normocephalic with no lesions or masses. Neck is supple with full range of motion     CARDIOVASCULAR  Carotid Arteries-no carotid bruits. NEUROLOGIC:  Mental Status-the patient is awake alert and oriented without aphasia or apraxia  Cranial Nerves: Visual fields are full to confrontation. Visual acuity is 20/20 with hand-held chart extraocular movements are full without nystagmus. Pupils are 2-1/2 mm and reactive.  Face is symmetrical to light touch. Movements of facial expression move symmetrically. Hearing is normal to finger rub bilaterally. Soft palate lifts symmetrically. Shoulder shrug is symmetrical. Tongue is midline without atrophy. Motor: No drift is noted on arm extension. Strength is full in the upper and lower extremities with normal bulk and tone. Sensory: Decreased light touch pinprick temperature sensation in a stocking distribution cortical function is intact. Coordination: Finger to nose testing is performed accurately. Romberg is negative. Gait reveals a normal base with symmetrical arm swing. Tandem walk is normal.  Reflexes:       2+ and symmetrical toes are downgoing    ROS:  Review of Systems   Constitutional: Negative for appetite change, fatigue and fever. HENT: Negative. Negative for hearing loss, tinnitus, trouble swallowing and voice change. Eyes: Negative. Negative for photophobia, pain and visual disturbance. Respiratory: Negative. Negative for shortness of breath. Cardiovascular: Negative. Negative for palpitations. Gastrointestinal: Negative. Negative for nausea and vomiting. Endocrine: Negative. Negative for cold intolerance. Genitourinary: Negative. Negative for dysuria, frequency and urgency. Musculoskeletal: Negative for back pain, gait problem, myalgias and neck pain. Skin: Negative. Negative for rash. Allergic/Immunologic: Negative. Neurological: Positive for numbness and headaches. Negative for dizziness, tremors, seizures, syncope, facial asymmetry, speech difficulty, weakness and light-headedness. Hematological: Negative. Does not bruise/bleed easily. Psychiatric/Behavioral: Negative. Negative for confusion, hallucinations and sleep disturbance.

## 2023-07-13 ENCOUNTER — OFFICE VISIT (OUTPATIENT)
Dept: OBGYN CLINIC | Facility: CLINIC | Age: 48
End: 2023-07-13
Payer: COMMERCIAL

## 2023-07-13 ENCOUNTER — APPOINTMENT (OUTPATIENT)
Dept: RADIOLOGY | Facility: CLINIC | Age: 48
End: 2023-07-13
Payer: OTHER GOVERNMENT

## 2023-07-13 VITALS
SYSTOLIC BLOOD PRESSURE: 120 MMHG | WEIGHT: 161 LBS | BODY MASS INDEX: 27.49 KG/M2 | OXYGEN SATURATION: 98 % | HEIGHT: 64 IN | HEART RATE: 76 BPM | DIASTOLIC BLOOD PRESSURE: 70 MMHG

## 2023-07-13 DIAGNOSIS — M25.561 RIGHT KNEE PAIN, UNSPECIFIED CHRONICITY: ICD-10-CM

## 2023-07-13 DIAGNOSIS — M25.562 LEFT KNEE PAIN, UNSPECIFIED CHRONICITY: ICD-10-CM

## 2023-07-13 DIAGNOSIS — M25.50 POLYARTHRALGIA: ICD-10-CM

## 2023-07-13 DIAGNOSIS — M17.0 OSTEOARTHRITIS OF BOTH KNEES, UNSPECIFIED OSTEOARTHRITIS TYPE: Primary | ICD-10-CM

## 2023-07-13 PROCEDURE — 73564 X-RAY EXAM KNEE 4 OR MORE: CPT

## 2023-07-13 PROCEDURE — 99244 OFF/OP CNSLTJ NEW/EST MOD 40: CPT | Performed by: FAMILY MEDICINE

## 2023-07-13 NOTE — PROGRESS NOTES
Accompanied by     Subjective:    Chief Complaint   Patient presents with   • Right Knee - Pain   • Left Knee - Pain       Vinicius Perez is a 53 y.o. female is presenting today for initial evaluation consultation for bilateral knee pain. Patient states that he has been ongoing with knee and multiple joint symptoms that have been ongoing since she was 9years old. She denies any inciting injury or event and reports that she has had multiple MRIs x-rays which come back unremarkable. She was seen by [facility] orthopedics approximately 10 years ago and had received cortisone injections for bilateral knees and referred to physical therapy. She states that the physical therapy did not help. Cortisone injections helped ease the pain slightly. She states that the knees are very sensitive and give out occasionally. She reports swelling in the knees and multiple other joints. She was seen by rheumatology in 2021 who had ordered rheumatologic panel which was unremarkable. They believed patient's pain symptoms are likely related to fibromyalgia. Patient has not seen a physician for fibromyalgia as of yet. She additionally reports neuropathy in the bilateral feet sensation of weakness in the arms and legs where she falls and drops things throughout the day. Also feels unsteady and dizzy from time to time. She is following with a neurologist and has an upcoming MRI and EMG ordered    The following portions of the patient's history were reviewed and updated as appropriate: allergies, current medications, past family history, past medical history, past social history, past surgical history and problem list.    Occupation:      Review of Systems   Constitutional: Negative for fever.         Objective:  /70   Pulse 76   Ht 5' 4" (1.626 m)   Wt 73 kg (161 lb)   SpO2 98%   BMI 27.64 kg/m²   Skin: no rashes, lesions, skin discolorations, lacerations  Neurologic: Patient sensory exam is within normal limits, she does have tenderness to palpation to light touch over the anterior aspect of the knees  Musculoskeletal: Bilateral KNEE EXAM  Gait: limping gait negative  Inspection:No erythema, no induration. There is no gross deformity. Palpation: Swelling: negative. Effusion: negative. Medial joint line TTP: negative  Lateral joint line TTP: negative  ROM: Full flexion and extension  Special tests: Gisselle's: negative  Instability to varus/valgus stress: negative  Anterior Drawer: negative Lachman's test: negative  Posterior Drawer: negative  Crepitus: negative        Imaging:       Assessment/Plan:  1. Left knee pain, unspecified chronicity    - XR knee 4+ vw left injury; Future    2. Right knee pain, unspecified chronicity    - XR knee 4+ vw right injury; Future    3. Osteoarthritis of both knees, unspecified osteoarthritis type    - Ambulatory Referral to Orthopedic Surgery    4. Polyarthralgia    - Ambulatory Referral to Rheumatology; Future      > 45 min devoted to review of previous, pertinent medical records, imaging, discussion of treatment options, counseling and documentation  Bilateral knee x-rays were obtained in office today. There were no acute fractures or dislocations noted. There is some decrease joint space narrowing in the patellofemoral joint however this is minimal.  Follow-up on official radiology report  Clinical impression is that patient's bilateral knee pain is multifactorial nature. She does have a component of degenerative changes in her knee which can be contributing to her symptoms however her primary pain generator seems to be arising from most likely fibromyalgia which is evidenced by pain to light sensation over the anterior aspect of the knee. We discussed the treatment options for her bilateral knee pain. She has had a rheumatologic work-up for her polyarthralgia which was negative and makes autoimmune etiology less likely.   I did advise her to follow-up with rheumatology specialist to discuss fibromyalgia management. I discussed that we can repeat cortisone injections for the bilateral knees however I do not know if this is going to completely alleviate her pain symptoms as these seem to be more sensory to light touch. I additionally recommended she keep her follow-up appointments for MRI and EMG as there may be a component of neurologic etiology given her symptoms. Follow up in 4 months. Should sx's worsen or any concerns arise, they were advised to follow up sooner or seek more immediate medical attention. All of the patient's concerns were addressed and questions answered. They verbalized agreement with and understanding of the treatment plan. We can revisit consideration for cortisone injections in the future after she is evaluated for her fibromyalgia management.

## 2023-07-19 ENCOUNTER — APPOINTMENT (OUTPATIENT)
Dept: LAB | Facility: HOSPITAL | Age: 48
End: 2023-07-19
Payer: OTHER GOVERNMENT

## 2023-07-19 ENCOUNTER — HOSPITAL ENCOUNTER (OUTPATIENT)
Dept: ULTRASOUND IMAGING | Facility: HOSPITAL | Age: 48
Discharge: HOME | End: 2023-07-19
Payer: OTHER GOVERNMENT

## 2023-07-19 DIAGNOSIS — G43.019 INTRACTABLE MIGRAINE WITHOUT AURA AND WITHOUT STATUS MIGRAINOSUS: ICD-10-CM

## 2023-07-19 DIAGNOSIS — R10.2 PELVIC PAIN: ICD-10-CM

## 2023-07-19 LAB
ALBUMIN SERPL BCP-MCNC: 4.3 G/DL (ref 3.5–5)
ALP SERPL-CCNC: 55 U/L (ref 34–104)
ALT SERPL W P-5'-P-CCNC: 14 U/L (ref 7–52)
ANION GAP SERPL CALCULATED.3IONS-SCNC: 9 MMOL/L
AST SERPL W P-5'-P-CCNC: 12 U/L (ref 13–39)
B BURGDOR IGG+IGM SER QL IA: NEGATIVE
BASOPHILS # BLD AUTO: 0.04 THOUSANDS/ÂΜL (ref 0–0.1)
BASOPHILS NFR BLD AUTO: 1 % (ref 0–1)
BILIRUB SERPL-MCNC: 0.66 MG/DL (ref 0.2–1)
BUN SERPL-MCNC: 12 MG/DL (ref 5–25)
CALCIUM SERPL-MCNC: 9.1 MG/DL (ref 8.4–10.2)
CHLORIDE SERPL-SCNC: 108 MMOL/L (ref 96–108)
CO2 SERPL-SCNC: 22 MMOL/L (ref 21–32)
CREAT SERPL-MCNC: 0.78 MG/DL (ref 0.6–1.3)
CRP SERPL QL: 2.5 MG/L
EOSINOPHIL # BLD AUTO: 0.15 THOUSAND/ÂΜL (ref 0–0.61)
EOSINOPHIL NFR BLD AUTO: 2 % (ref 0–6)
ERYTHROCYTE [DISTWIDTH] IN BLOOD BY AUTOMATED COUNT: 11.5 % (ref 11.6–15.1)
ERYTHROCYTE [SEDIMENTATION RATE] IN BLOOD: 3 MM/HOUR (ref 0–19)
GFR SERPL CREATININE-BSD FRML MDRD: 90 ML/MIN/1.73SQ M
GLUCOSE SERPL-MCNC: 85 MG/DL (ref 65–140)
HCT VFR BLD AUTO: 43.4 % (ref 34.8–46.1)
HGB BLD-MCNC: 15.4 G/DL (ref 11.5–15.4)
IMM GRANULOCYTES # BLD AUTO: 0.03 THOUSAND/UL (ref 0–0.2)
IMM GRANULOCYTES NFR BLD AUTO: 0 % (ref 0–2)
LYMPHOCYTES # BLD AUTO: 2.78 THOUSANDS/ÂΜL (ref 0.6–4.47)
LYMPHOCYTES NFR BLD AUTO: 35 % (ref 14–44)
MCH RBC QN AUTO: 33.3 PG (ref 26.8–34.3)
MCHC RBC AUTO-ENTMCNC: 35.5 G/DL (ref 31.4–37.4)
MCV RBC AUTO: 94 FL (ref 82–98)
MONOCYTES # BLD AUTO: 0.58 THOUSAND/ÂΜL (ref 0.17–1.22)
MONOCYTES NFR BLD AUTO: 7 % (ref 4–12)
NEUTROPHILS # BLD AUTO: 4.37 THOUSANDS/ÂΜL (ref 1.85–7.62)
NEUTS SEG NFR BLD AUTO: 55 % (ref 43–75)
NRBC BLD AUTO-RTO: 0 /100 WBCS
PLATELET # BLD AUTO: 282 THOUSANDS/UL (ref 149–390)
PMV BLD AUTO: 9.8 FL (ref 8.9–12.7)
POTASSIUM SERPL-SCNC: 3.7 MMOL/L (ref 3.5–5.3)
PROT SERPL-MCNC: 7.7 G/DL (ref 6.4–8.4)
RBC # BLD AUTO: 4.62 MILLION/UL (ref 3.81–5.12)
SODIUM SERPL-SCNC: 139 MMOL/L (ref 135–147)
WBC # BLD AUTO: 7.95 THOUSAND/UL (ref 4.31–10.16)

## 2023-07-19 PROCEDURE — 80053 COMPREHEN METABOLIC PANEL: CPT

## 2023-07-19 PROCEDURE — 36415 COLL VENOUS BLD VENIPUNCTURE: CPT

## 2023-07-19 PROCEDURE — 86140 C-REACTIVE PROTEIN: CPT

## 2023-07-19 PROCEDURE — 85652 RBC SED RATE AUTOMATED: CPT

## 2023-07-19 PROCEDURE — 76830 TRANSVAGINAL US NON-OB: CPT

## 2023-07-19 PROCEDURE — 85025 COMPLETE CBC W/AUTO DIFF WBC: CPT

## 2023-07-19 PROCEDURE — 86618 LYME DISEASE ANTIBODY: CPT

## 2023-07-19 PROCEDURE — 76856 US EXAM PELVIC COMPLETE: CPT

## 2023-07-27 ENCOUNTER — HOSPITAL ENCOUNTER (OUTPATIENT)
Dept: MRI IMAGING | Facility: CLINIC | Age: 48
Discharge: HOME | End: 2023-07-27
Payer: OTHER GOVERNMENT

## 2023-07-27 DIAGNOSIS — G43.019 INTRACTABLE MIGRAINE WITHOUT AURA AND WITHOUT STATUS MIGRAINOSUS: ICD-10-CM

## 2023-07-27 PROCEDURE — 70551 MRI BRAIN STEM W/O DYE: CPT

## 2023-07-27 PROCEDURE — G1004 CDSM NDSC: HCPCS

## 2023-07-28 ENCOUNTER — TELEPHONE (OUTPATIENT)
Dept: NEUROLOGY | Facility: CLINIC | Age: 48
End: 2023-07-28

## 2023-07-28 DIAGNOSIS — G43.019 INTRACTABLE MIGRAINE WITHOUT AURA AND WITHOUT STATUS MIGRAINOSUS: Primary | ICD-10-CM

## 2023-07-28 NOTE — TELEPHONE ENCOUNTER
Fax received from pharmacy, PA needed for ajovy. ID: [id number]  GROUP: [id number]  PCN: [id number]  BIN: [id number]    Moyer: [id number]    PA submitted, awaiting determination.

## 2023-07-31 ENCOUNTER — TELEPHONE (OUTPATIENT)
Dept: FAMILY MEDICINE CLINIC | Facility: CLINIC | Age: 48
End: 2023-07-31

## 2023-07-31 ENCOUNTER — OFFICE VISIT (OUTPATIENT)
Dept: FAMILY MEDICINE CLINIC | Facility: CLINIC | Age: 48
End: 2023-07-31
Payer: COMMERCIAL

## 2023-07-31 VITALS
DIASTOLIC BLOOD PRESSURE: 74 MMHG | SYSTOLIC BLOOD PRESSURE: 116 MMHG | BODY MASS INDEX: 27.83 KG/M2 | HEIGHT: 64 IN | WEIGHT: 163 LBS | HEART RATE: 85 BPM | OXYGEN SATURATION: 99 % | TEMPERATURE: 97.8 F

## 2023-07-31 DIAGNOSIS — R07.9 CHEST PAIN, UNSPECIFIED TYPE: ICD-10-CM

## 2023-07-31 DIAGNOSIS — Z79.4 TYPE 2 DIABETES MELLITUS WITH DIABETIC POLYNEUROPATHY, WITH LONG-TERM CURRENT USE OF INSULIN (HCC): Primary | ICD-10-CM

## 2023-07-31 DIAGNOSIS — E11.42 TYPE 2 DIABETES MELLITUS WITH DIABETIC POLYNEUROPATHY, WITH LONG-TERM CURRENT USE OF INSULIN (HCC): Primary | ICD-10-CM

## 2023-07-31 PROCEDURE — 99214 OFFICE O/P EST MOD 30 MIN: CPT | Performed by: FAMILY MEDICINE

## 2023-07-31 RX ORDER — GABAPENTIN 300 MG/1
600 CAPSULE ORAL 3 TIMES DAILY
Qty: 180 CAPSULE | Refills: 0 | Status: SHIPPED | OUTPATIENT
Start: 2023-07-31

## 2023-07-31 NOTE — PATIENT INSTRUCTIONS
Please increase to 600 mg of Gabapentin nightly for 3 days, then 600 mg twice daily for 3 days and then 600 mg three times daily for 3 days.

## 2023-07-31 NOTE — PROGRESS NOTES
Name: Leonarda Ayala      : 1975      MRN: [id number]  Encounter Provider: Nevaeh Hughes DO  Encounter Date: 2023   Encounter department: [address] [place] [address] [facility]     1. Type 2 diabetes mellitus with diabetic polyneuropathy, with long-term current use of insulin (HCC)  -     gabapentin (Neurontin) 300 mg capsule; Take 2 capsules (600 mg total) by mouth 3 (three) times a day    2. Chest pain, unspecified type  -     Stress test only, exercise; Future; Expected date: 2023       Seems to be related to anxiety/GERD but due to risk factors and no previous cardiac work up, will evaluate with stress test. Unable to complete EKG in office and EKG machine is out of order. Due to symptoms and risk factors, patient needs to complete stress test prior to upcoming surgery. Subjective      HPI     Notes that she would like to switch her gabapentin to a different medication. States that this is not working well for her. She was previously on Lyrica which worked well for her. She was also on Cymbalta previously. Notes that for the last 2 weeks she has noticed chest pain in the middle of her chest, feels like this is moving up toward throat. Exacerbated by stress, yelling or having a bad day. States that this feels like heartburn. Sometimes on the right side. Reports that her arms feel heavy, R>>L. Reports fatigue, headache, dizziness. Reports palpitation with chest pain. States that this has been going on for more than 1 year, on and off. States that she went to the ED for 1 year. Reports that everything was normal then. This is not exacerbated by exercise. Notes that she follows with [facility] and her surgery group in [facility] for GI. Notes that she has a grandmother is her only relative with CAD, had MI at 80 and passed.      Review of Systems    Current Outpatient Medications on File Prior to Visit   Medication Sig   • albuterol (PROVENTIL HFA,VENTOLIN HFA) 90 mcg/act inhaler Inhale 1 puff every 4 (four) hours as needed for wheezing   • amitriptyline (ELAVIL) 50 mg tablet Take 50 mg by mouth daily at bedtime   • ARIPiprazole (ABILIFY) 2 mg tablet Take 2 mg by mouth every morning   • atorvastatin (LIPITOR) 80 mg tablet Take 1 tablet (80 mg total) by mouth daily at bedtime   • Blood Glucose Monitoring Suppl (ONE TOUCH ULTRA 2) w/Device KIT Inject into the skin in the morning As directed   • colestipol (COLESTID) 1 g tablet Take 1 tablet (1 g total) by mouth 3 (three) times a day   • Continuous Blood Gluc  (FreeStyle Elaina 2 Montara) BENEDICT Use to monitor blood sugar 4 times per day. • Continuous Blood Gluc Sensor (FreeStyle Elaina 14 Day Sensor) MISC USE  TO MONITOR  GLUCOSE 4 TIMES DAILY FOR 14 DAYS THEN REPLACE   • Continuous Blood Gluc Sensor (FreeStyle Elaina 2 Sensor) MISC USE TO CHECK BLOOD SUGAR 4 TIMES DAILY REPLACE  EVERY  14  DAYS   • Diclofenac Sodium (VOLTAREN) 1 % Apply 2 g topically 4 (four) times a day   • dicyclomine (BENTYL) 20 mg tablet Take 1 tablet (20 mg total) by mouth every 6 (six) hours as needed (abdominal pain)   • diphenoxylate-atropine (LOMOTIL) 2.5-0.025 mg per tablet    • Empagliflozin 25 MG TABS Take 1 tablet (25 mg total) by mouth every morning   • fremanezumab-vfrm (Ajovy) 225 MG/1.5ML prefilled syringe Inject 1.5 mL (225 mg total) under the skin every 30 (thirty) days   • glucose blood test strip Check blood sugar four times at day.    • hydrocortisone 2.5 % cream Apply topically 3 (three) times a day   • Insulin Glargine Solostar (Lantus SoloStar) 100 UNIT/ML SOPN Inject 0.45 mL (45 Units total) under the skin daily at bedtime   • insulin lispro (HumaLOG KwikPen) 100 units/mL injection pen Inject 5 Units under the skin 3 (three) times a day with meals   • Insulin Pen Needle (B-D ULTRAFINE III SHORT PEN) 31G X 8 MM MISC USE AS DIRECTED   • Lancets (onetouch ultrasoft) lancets Check blood sugar four times at day. • metFORMIN (GLUCOPHAGE) 1000 MG tablet Take 1 tablet (1,000 mg total) by mouth 2 (two) times a day with meals   • montelukast (SINGULAIR) 10 mg tablet Take 1 tablet (10 mg total) by mouth daily at bedtime   • ondansetron (ZOFRAN) 4 mg tablet Take 1 tablet (4 mg total) by mouth every 8 (eight) hours as needed for nausea or vomiting   • topiramate (Topamax) 50 MG tablet Take 1 tablet (50 mg total) by mouth 2 (two) times a day   • traZODone (DESYREL) 50 mg tablet Take 50 mg by mouth daily at bedtime   • triamcinolone (KENALOG) 0.1 % ointment Apply topically 2 (two) times a day   • [DISCONTINUED] gabapentin (NEURONTIN) 300 mg capsule Take 1 capsule by mouth 4 times daily     Objective     /74 (BP Location: Left arm, Patient Position: Sitting, Cuff Size: Adult)   Pulse 85   Temp 97.8 °F (36.6 °C) (Tympanic)   Ht 5' 4" (1.626 m)   Wt 73.9 kg (163 lb)   SpO2 99%   BMI 27.98 kg/m²     Physical Exam  Vitals reviewed. Constitutional:       General: She is not in acute distress. Appearance: Normal appearance. HENT:      Head: Normocephalic and atraumatic. Right Ear: External ear normal.      Left Ear: External ear normal.      Nose: Nose normal.      Mouth/Throat:      Mouth: Mucous membranes are moist.   Eyes:      Extraocular Movements: Extraocular movements intact. Conjunctiva/sclera: Conjunctivae normal.      Pupils: Pupils are equal, round, and reactive to light. Cardiovascular:      Rate and Rhythm: Normal rate and regular rhythm. Heart sounds: Normal heart sounds. Pulmonary:      Effort: Pulmonary effort is normal.      Breath sounds: Normal breath sounds. No wheezing, rhonchi or rales. Abdominal:      General: Abdomen is flat. Bowel sounds are normal. There is no distension. Palpations: Abdomen is soft. Musculoskeletal:         General: No deformity. Right lower leg: No edema. Left lower leg: No edema. Skin:     General: Skin is warm.       Capillary Refill: Capillary refill takes less than 2 seconds. Findings: No rash. Neurological:      Mental Status: She is alert. Mental status is at baseline.         Liss Polo, DO

## 2023-07-31 NOTE — TELEPHONE ENCOUNTER
Patient gave paperwork to be filled out by provider at today's visit. Please call patient when completed.

## 2023-08-01 DIAGNOSIS — Z79.4 TYPE 2 DIABETES MELLITUS WITH DIABETIC POLYNEUROPATHY, WITH LONG-TERM CURRENT USE OF INSULIN (HCC): ICD-10-CM

## 2023-08-01 DIAGNOSIS — E11.42 TYPE 2 DIABETES MELLITUS WITH DIABETIC POLYNEUROPATHY, WITH LONG-TERM CURRENT USE OF INSULIN (HCC): ICD-10-CM

## 2023-08-01 NOTE — TELEPHONE ENCOUNTER
SPoke to patient. Advised of script change. Verbalized understanding. Please sign 2 prescriptions for emgality. Once sent, will verify with pharmacy if PA is needed. Insurance will require scripts to be sent to [address] [place] [address],[address].

## 2023-08-01 NOTE — TELEPHONE ENCOUNTER
Ajovy denied. Preferred medications are emgality and aimovig. Please advise on contraindications to these or if you are agreeable to ordering alternative.

## 2023-08-02 RX ORDER — PEN NEEDLE, DIABETIC 31 GX5/16"
NEEDLE, DISPOSABLE MISCELLANEOUS
Qty: 100 EACH | Refills: 0 | Status: SHIPPED | OUTPATIENT
Start: 2023-08-02

## 2023-08-04 ENCOUNTER — HOSPITAL ENCOUNTER (OUTPATIENT)
Dept: NON INVASIVE DIAGNOSTICS | Facility: CLINIC | Age: 48
Discharge: HOME | End: 2023-08-04

## 2023-08-07 ENCOUNTER — OFFICE VISIT (OUTPATIENT)
Dept: OBGYN CLINIC | Facility: CLINIC | Age: 48
End: 2023-08-07
Payer: COMMERCIAL

## 2023-08-07 VITALS
OXYGEN SATURATION: 98 % | SYSTOLIC BLOOD PRESSURE: 100 MMHG | HEART RATE: 79 BPM | RESPIRATION RATE: 18 BRPM | BODY MASS INDEX: 28.13 KG/M2 | WEIGHT: 164.8 LBS | DIASTOLIC BLOOD PRESSURE: 67 MMHG | HEIGHT: 64 IN

## 2023-08-07 DIAGNOSIS — M17.0 OSTEOARTHRITIS OF BOTH KNEES, UNSPECIFIED OSTEOARTHRITIS TYPE: Primary | ICD-10-CM

## 2023-08-07 DIAGNOSIS — M25.50 POLYARTHRALGIA: ICD-10-CM

## 2023-08-07 PROCEDURE — 20610 DRAIN/INJ JOINT/BURSA W/O US: CPT | Performed by: FAMILY MEDICINE

## 2023-08-07 PROCEDURE — 99213 OFFICE O/P EST LOW 20 MIN: CPT | Performed by: FAMILY MEDICINE

## 2023-08-07 RX ORDER — TRIAMCINOLONE ACETONIDE 40 MG/ML
40 INJECTION, SUSPENSION INTRA-ARTICULAR; INTRAMUSCULAR
Status: COMPLETED | OUTPATIENT
Start: 2023-08-07 | End: 2023-08-07

## 2023-08-07 RX ORDER — BUPIVACAINE HYDROCHLORIDE 2.5 MG/ML
4 INJECTION, SOLUTION INFILTRATION; PERINEURAL
Status: COMPLETED | OUTPATIENT
Start: 2023-08-07 | End: 2023-08-07

## 2023-08-07 RX ADMIN — TRIAMCINOLONE ACETONIDE 40 MG: 40 INJECTION, SUSPENSION INTRA-ARTICULAR; INTRAMUSCULAR at 08:30

## 2023-08-07 RX ADMIN — BUPIVACAINE HYDROCHLORIDE 4 ML: 2.5 INJECTION, SOLUTION INFILTRATION; PERINEURAL at 08:30

## 2023-08-07 NOTE — PROGRESS NOTES
Subjective:    Chief Complaint   Patient presents with   • Left Knee - Follow-up, Pain   • Right Knee - Follow-up, Pain       Anna Rader is a 53 y.o. female presenting for bilateral knee pain that been ongoing for greater than 10 years. As per patient reports she has followed with [facility] orthopedics in the past and had received bilateral cortisone injections for the knees which did provide her significant relief. Reports that prior MRIs and x-rays were unremarkable. Has established with rheumatology to discuss fibromyalgia management. The following portions of the patient's history were reviewed and updated as appropriate: allergies, current medications, past family history, past medical history, past social history, past surgical history and problem list.    Occupation:      Review of Systems   Constitutional: Negative for fever. Objective:  /67   Pulse 79   Resp 18   Ht 5' 4" (1.626 m)   Wt 74.8 kg (164 lb 12.8 oz)   SpO2 98%   BMI 28.29 kg/m²   Skin: no rashes, lesions, skin discolorations, lacerations  Neurologic: Patient sensory exam is within normal limits, she does have tenderness to palpation to light touch over the anterior aspect of the knees  Musculoskeletal: Bilateral KNEE EXAM  Gait: limping gait negative  Inspection:No erythema, no induration. There is no gross deformity. Palpation: Swelling: negative. Effusion: negative. Medial joint line TTP: negative  Lateral joint line TTP: negative  ROM: Full flexion and extension  Special tests: Gisselle's: negative  Instability to varus/valgus stress: negative  Anterior Drawer: negative Lachman's test: negative  Posterior Drawer: negative  Crepitus: negative        Imaging:       Assessment/Plan:  1. Osteoarthritis of both knees, unspecified osteoarthritis type      2. Polyarthralgia      We discussed patient's ongoing bilateral knee pain symptoms.   Her prior x-rays that were obtained did reveal some mild degenerative changes which we discussed may be contributing to her pain symptoms. She had received positive response from cortisone injections in the past we discussed that we can repeat these injections to see if they improve her symptoms. Patient is agreeable and CSI performed to bilateral knees in office today. Additionally I do believe there is a component of fibromyalgia contributing to patient's pain symptoms and advised that she follow-up with rheumatology to discuss fibromyalgia management. She is scheduled for upcoming appointment in fall. Return as needed if symptoms fail to improve.

## 2023-08-07 NOTE — PROGRESS NOTES
Large joint arthrocentesis: bilateral knee  Universal Protocol:  Consent: Verbal consent obtained. Risks and benefits: risks, benefits and alternatives were discussed  Consent given by: patient    Supporting Documentation  Indications: pain   Procedure Details  Location: knee - bilateral knee  Preparation: Patient was prepped and draped in the usual sterile fashion  Needle size: 22 G  Ultrasound guidance: no  Approach: anterolateral    Medications (Right): 4 mL bupivacaine 0.25 %; 40 mg triamcinolone acetonide 40 mg/mLMedications (Left): 4 mL bupivacaine 0.25 %; 40 mg triamcinolone acetonide 40 mg/mL   Patient tolerance: patient tolerated the procedure well with no immediate complications    Risks and benefits of CSI were discussed with patient extensively. Risks were highlighted which included but were not limited to infection, pain, local site swelling, and chance that injection may not be effective. Patient was also counseled regarding glucose elevation days after receiving CSI and to be mindful of diet and check sugars daily.  Patient agreeable to proceed with CSI after counseling.         '

## 2023-08-07 NOTE — TELEPHONE ENCOUNTER
PA approved through 2/3/2024. Spoke to patient. She is aware of approval. No questions regarding how to inject.

## 2023-08-08 ENCOUNTER — HOSPITAL ENCOUNTER (OUTPATIENT)
Dept: NON INVASIVE DIAGNOSTICS | Facility: CLINIC | Age: 48
Discharge: HOME | End: 2023-08-08
Payer: OTHER GOVERNMENT

## 2023-08-08 VITALS
WEIGHT: 163 LBS | DIASTOLIC BLOOD PRESSURE: 80 MMHG | HEART RATE: 77 BPM | HEIGHT: 64 IN | SYSTOLIC BLOOD PRESSURE: 116 MMHG | BODY MASS INDEX: 27.83 KG/M2 | OXYGEN SATURATION: 100 %

## 2023-08-08 DIAGNOSIS — R07.9 CHEST PAIN, UNSPECIFIED TYPE: ICD-10-CM

## 2023-08-08 LAB
MAX HR PERCENT: 90 %
MAX HR: 157 BPM
RATE PRESSURE PRODUCT: NORMAL
SL CV STRESS RECOVERY BP: NORMAL MMHG
SL CV STRESS RECOVERY HR: 96 BPM
SL CV STRESS RECOVERY O2 SAT: 99 %
SL CV STRESS STAGE REACHED: 3
STRESS ANGINA INDEX: 0
STRESS BASELINE BP: NORMAL MMHG
STRESS BASELINE HR: 77 BPM
STRESS O2 SAT REST: 100 %
STRESS PEAK HR: 157 BPM
STRESS POST ESTIMATED WORKLOAD: 10.1 METS
STRESS POST EXERCISE DUR MIN: 7 MIN
STRESS POST EXERCISE DUR SEC: 37 SEC
STRESS POST O2 SAT PEAK: 100 %
STRESS POST PEAK BP: 180 MMHG

## 2023-08-08 PROCEDURE — 93016 CV STRESS TEST SUPVJ ONLY: CPT | Performed by: INTERNAL MEDICINE

## 2023-08-08 PROCEDURE — 93017 CV STRESS TEST TRACING ONLY: CPT

## 2023-08-08 PROCEDURE — 93018 CV STRESS TEST I&R ONLY: CPT | Performed by: INTERNAL MEDICINE

## 2023-08-08 NOTE — NURSING NOTE
Patient was assessed prior to treadmill stress test, lungs are cta bilaterally. Patient presented ambulating with a cane. Patient states she has arthritis in knees and just had cortisone shots yesterday. Pt stated that she can ambulate on treadmill and she just uses the cane for support. Pt had some mild to moderate sob during testing that resolved in minute 3 of recovery. Denied any chest pain.

## 2023-08-09 LAB
CHEST PAIN STATEMENT: NORMAL
MAX DIASTOLIC BP: 78 MMHG
MAX HEART RATE: 157 BPM
MAX PREDICTED HEART RATE: 173 BPM
MAX. SYSTOLIC BP: 180 MMHG
PROTOCOL NAME: NORMAL
TARGET HR FORMULA: NORMAL
TIME IN EXERCISE PHASE: NORMAL

## 2023-08-19 DIAGNOSIS — Z79.4 TYPE 2 DIABETES MELLITUS WITH HYPERGLYCEMIA, WITH LONG-TERM CURRENT USE OF INSULIN (HCC): ICD-10-CM

## 2023-08-19 DIAGNOSIS — E11.65 TYPE 2 DIABETES MELLITUS WITH HYPERGLYCEMIA, WITH LONG-TERM CURRENT USE OF INSULIN (HCC): ICD-10-CM

## 2023-08-19 RX ORDER — FLASH GLUCOSE SENSOR
KIT MISCELLANEOUS
Qty: 2 EACH | Refills: 0 | Status: SHIPPED | OUTPATIENT
Start: 2023-08-19

## 2023-08-21 DIAGNOSIS — Z79.4 TYPE 2 DIABETES MELLITUS WITH DIABETIC POLYNEUROPATHY, WITH LONG-TERM CURRENT USE OF INSULIN (HCC): ICD-10-CM

## 2023-08-21 DIAGNOSIS — E11.42 TYPE 2 DIABETES MELLITUS WITH DIABETIC POLYNEUROPATHY, WITH LONG-TERM CURRENT USE OF INSULIN (HCC): ICD-10-CM

## 2023-08-21 RX ORDER — GABAPENTIN 300 MG/1
600 CAPSULE ORAL 3 TIMES DAILY
Qty: 180 CAPSULE | Refills: 0 | Status: SHIPPED | OUTPATIENT
Start: 2023-08-21

## 2023-08-29 ENCOUNTER — OFFICE VISIT (OUTPATIENT)
Dept: URGENT CARE | Facility: CLINIC | Age: 48
End: 2023-08-29
Payer: COMMERCIAL

## 2023-08-29 VITALS
RESPIRATION RATE: 18 BRPM | SYSTOLIC BLOOD PRESSURE: 103 MMHG | WEIGHT: 160 LBS | OXYGEN SATURATION: 98 % | BODY MASS INDEX: 27.46 KG/M2 | HEART RATE: 88 BPM | DIASTOLIC BLOOD PRESSURE: 80 MMHG | TEMPERATURE: 98.7 F

## 2023-08-29 DIAGNOSIS — J06.9 UPPER RESPIRATORY TRACT INFECTION, UNSPECIFIED TYPE: Primary | ICD-10-CM

## 2023-08-29 LAB
SARS-COV-2 AG UPPER RESP QL IA: NEGATIVE
VALID CONTROL: NORMAL

## 2023-08-29 PROCEDURE — 99213 OFFICE O/P EST LOW 20 MIN: CPT | Performed by: PHYSICIAN ASSISTANT

## 2023-08-29 PROCEDURE — 87811 SARS-COV-2 COVID19 W/OPTIC: CPT | Performed by: PHYSICIAN ASSISTANT

## 2023-08-29 NOTE — PROGRESS NOTES
[facility]        NAME: Kelsi Miner is a 53 y.o. female  : 1975    MRN: [id number]  DATE: 2023  TIME: 4:09 PM      Assessment and Plan     Upper respiratory tract infection, unspecified type [J06.9]  1. Upper respiratory tract infection, unspecified type  Poct Covid 19 Rapid Antigen Test        Note:   Patient should take a test again tomorrow or the next day, as on day 2 sometimes the rapid tests are not accurate. Patient Instructions   There are no Patient Instructions on file for this visit. Follow up with PCP in 3-5 days. Go to ER if symptoms worsen. Chief Complaint     Chief Complaint   Patient presents with   • Cold Like Symptoms     + Covid Today . 2 days fatigue, headache, body aches. No fever. Scratchy thraot. History of Present Illness     Patient presents with headache, sore throat, body aches, and cough x 2 days. She states her  tested positive for COVID today. Review of Systems     Review of Systems   Constitutional: Negative for chills, fatigue and fever. HENT: Positive for sore throat. Negative for congestion, ear pain, postnasal drip, rhinorrhea, sinus pressure and sinus pain. Eyes: Negative for pain and visual disturbance. Respiratory: Positive for cough. Negative for chest tightness and shortness of breath. Cardiovascular: Negative for chest pain and palpitations. Gastrointestinal: Negative for abdominal pain, diarrhea, nausea and vomiting. Genitourinary: Negative for dysuria and hematuria. Musculoskeletal: Positive for myalgias. Negative for arthralgias and back pain. Skin: Negative for rash. Neurological: Positive for headaches. Negative for dizziness, seizures, syncope and numbness. All other systems reviewed and are negative.         Current Medications       Current Outpatient Medications:   •  albuterol (PROVENTIL HFA,VENTOLIN HFA) 90 mcg/act inhaler, Inhale 1 puff every 4 (four) hours as needed for wheezing, Disp: 6.7 g, Rfl: 2  •  amitriptyline (ELAVIL) 50 mg tablet, Take 50 mg by mouth daily at bedtime, Disp: , Rfl:   •  ARIPiprazole (ABILIFY) 2 mg tablet, Take 2 mg by mouth every morning, Disp: , Rfl:   •  atorvastatin (LIPITOR) 80 mg tablet, Take 1 tablet (80 mg total) by mouth daily at bedtime, Disp: 90 tablet, Rfl: 0  •  B-D ULTRAFINE III SHORT PEN 31G X 8 MM MISC, USE AS DIRECTED, Disp: 100 each, Rfl: 0  •  Blood Glucose Monitoring Suppl (ONE TOUCH ULTRA 2) w/Device KIT, Inject into the skin in the morning As directed, Disp: 1 kit, Rfl: 0  •  colestipol (COLESTID) 1 g tablet, Take 1 tablet (1 g total) by mouth 3 (three) times a day, Disp: 90 tablet, Rfl: 3  •  Continuous Blood Gluc  (FreeStyle Elaina 2 Anna) BENEDICT, Use to monitor blood sugar 4 times per day., Disp: 1 each, Rfl: 0  •  Continuous Blood Gluc Sensor (FreeStyle Elaina 14 Day Sensor) MISC, USE TO MONITOR GLUCOSE 4 TIMES DAILY FOR 14 DAYS THEN REPLACE, Disp: 2 each, Rfl: 0  •  Continuous Blood Gluc Sensor (FreeStyle Elaina 2 Sensor) MISC, USE TO CHECK BLOOD SUGAR 4 TIMES DAILY REPLACE  EVERY  14  DAYS, Disp: 2 each, Rfl: 0  •  Diclofenac Sodium (VOLTAREN) 1 %, Apply 2 g topically 4 (four) times a day, Disp: 50 g, Rfl: 1  •  dicyclomine (BENTYL) 20 mg tablet, Take 1 tablet (20 mg total) by mouth every 6 (six) hours as needed (abdominal pain), Disp: 60 tablet, Rfl: 3  •  diphenoxylate-atropine (LOMOTIL) 2.5-0.025 mg per tablet, , Disp: , Rfl:   •  Empagliflozin 25 MG TABS, Take 1 tablet (25 mg total) by mouth every morning, Disp: 90 tablet, Rfl: 1  •  fremanezumab-vfrm (Ajovy) 225 MG/1.5ML prefilled syringe, Inject 1.5 mL (225 mg total) under the skin every 30 (thirty) days, Disp: 1.5 mL, Rfl: 3  •  gabapentin (NEURONTIN) 300 mg capsule, TAKE 2 CAPSULES BY MOUTH THREE TIMES DAILY, Disp: 180 capsule, Rfl: 0  •  [START ON 9/1/2023] Galcanezumab-gnlm 120 MG/ML SOAJ, 1 pen every 30 days for maintenance dose.  Do not start before September 1, ., Disp: 1 mL, Rfl: 11  •  glucose blood test strip, Check blood sugar four times at day., Disp: 100 strip, Rfl: 1  •  hydrocortisone 2.5 % cream, Apply topically 3 (three) times a day, Disp: 30 g, Rfl: 0  •  Insulin Glargine Solostar (Lantus SoloStar) 100 UNIT/ML SOPN, Inject 0.45 mL (45 Units total) under the skin daily at bedtime, Disp: 42 mL, Rfl: 1  •  insulin lispro (HumaLOG KwikPen) 100 units/mL injection pen, Inject 5 Units under the skin 3 (three) times a day with meals, Disp: 15 mL, Rfl: 0  •  Lancets (onetouch ultrasoft) lancets, Check blood sugar four times at day., Disp: 100 each, Rfl: 1  •  metFORMIN (GLUCOPHAGE) 1000 MG tablet, Take 1 tablet (1,000 mg total) by mouth 2 (two) times a day with meals, Disp: 60 tablet, Rfl: 2  •  montelukast (SINGULAIR) 10 mg tablet, Take 1 tablet (10 mg total) by mouth daily at bedtime, Disp: 30 tablet, Rfl: 2  •  ondansetron (ZOFRAN) 4 mg tablet, Take 1 tablet (4 mg total) by mouth every 8 (eight) hours as needed for nausea or vomiting, Disp: 20 tablet, Rfl: 0  •  topiramate (Topamax) 50 MG tablet, Take 1 tablet (50 mg total) by mouth 2 (two) times a day, Disp: 60 tablet, Rfl: 2  •  traZODone (DESYREL) 50 mg tablet, Take 50 mg by mouth daily at bedtime, Disp: , Rfl:   •  triamcinolone (KENALOG) 0.1 % ointment, Apply topically 2 (two) times a day, Disp: 30 g, Rfl: 0    Current Allergies     Allergies as of 2023   • (No Known Allergies)              The following portions of the patient's history were reviewed and updated as appropriate: allergies, current medications, past family history, past medical history, past social history, past surgical history, and problem list.     Past Medical History:   Diagnosis Date   • Asthma    • Bipolar 1 disorder ([address] ) 2022   • Colon polyp    • Diabetes mellitus (HCC)    • Fibromyalgia    • Hyperlipidemia    • Varicella        Past Surgical History:   Procedure Laterality Date   •  SECTION     • CHOLECYSTECTOMY • COLONOSCOPY N/A 10/24/2018    Procedure: COLONOSCOPY;  Surgeon: Dell Meier MD;  Location: [facility]; Service: Gastroenterology   • ESOPHAGOGASTRODUODENOSCOPY N/A 10/26/2018    Procedure: ESOPHAGOGASTRODUODENOSCOPY (EGD); Surgeon: Dell Meier MD;  Location: [facility]; Service: Gastroenterology   • TUBAL LIGATION         Family History   Problem Relation Age of Onset   • Diabetes Mother    • No Known Problems Sister    • No Known Problems Sister    • No Known Problems Maternal Aunt    • No Known Problems Maternal Aunt    • No Known Problems Maternal Aunt    • No Known Problems Maternal Aunt    • No Known Problems Maternal Aunt    • Cancer Maternal Aunt    • Breast cancer Cousin 44   • Prostate cancer Maternal Uncle    • Prostate cancer Maternal Uncle    • Cancer Maternal Uncle          Medications have been verified. Objective     /80   Pulse 88   Temp 98.7 °F (37.1 °C)   Resp 18   Wt 72.6 kg (160 lb)   LMP 07/12/2023   SpO2 98%   BMI 27.46 kg/m²   Patient's last menstrual period was 07/12/2023. Physical Exam     Physical Exam  Vitals and nursing note reviewed. Constitutional:       Appearance: Normal appearance. She is normal weight. HENT:      Head: Normocephalic and atraumatic. Nose: Nose normal.      Mouth/Throat:      Mouth: Mucous membranes are moist.      Pharynx: Oropharynx is clear. Cardiovascular:      Rate and Rhythm: Normal rate and regular rhythm. Heart sounds: Normal heart sounds. Pulmonary:      Effort: Pulmonary effort is normal.      Breath sounds: Normal breath sounds. Skin:     General: Skin is warm and dry. Neurological:      General: No focal deficit present. Mental Status: She is alert and oriented to person, place, and time.    Psychiatric:         Mood and Affect: Mood normal.         Behavior: Behavior normal.

## 2023-09-01 DIAGNOSIS — Z79.4 TYPE 2 DIABETES MELLITUS WITH DIABETIC POLYNEUROPATHY, WITH LONG-TERM CURRENT USE OF INSULIN (HCC): ICD-10-CM

## 2023-09-01 DIAGNOSIS — E11.42 TYPE 2 DIABETES MELLITUS WITH DIABETIC POLYNEUROPATHY, WITH LONG-TERM CURRENT USE OF INSULIN (HCC): ICD-10-CM

## 2023-09-01 RX ORDER — INSULIN LISPRO 100 [IU]/ML
5 INJECTION, SOLUTION INTRAVENOUS; SUBCUTANEOUS
Qty: 15 ML | Refills: 0 | Status: SHIPPED | OUTPATIENT
Start: 2023-09-01

## 2023-09-05 ENCOUNTER — PROCEDURE VISIT (OUTPATIENT)
Dept: NEUROLOGY | Facility: CLINIC | Age: 48
End: 2023-09-05
Payer: COMMERCIAL

## 2023-09-05 DIAGNOSIS — E11.42 TYPE 2 DIABETES MELLITUS WITH DIABETIC POLYNEUROPATHY, WITH LONG-TERM CURRENT USE OF INSULIN (HCC): ICD-10-CM

## 2023-09-05 DIAGNOSIS — Z79.4 TYPE 2 DIABETES MELLITUS WITH DIABETIC POLYNEUROPATHY, WITH LONG-TERM CURRENT USE OF INSULIN (HCC): ICD-10-CM

## 2023-09-05 PROCEDURE — 95911 NRV CNDJ TEST 9-10 STUDIES: CPT | Performed by: PHYSICAL MEDICINE & REHABILITATION

## 2023-09-05 PROCEDURE — 95886 MUSC TEST DONE W/N TEST COMP: CPT | Performed by: PHYSICAL MEDICINE & REHABILITATION

## 2023-09-05 NOTE — PROGRESS NOTES
EMG 2 limb lower extremity     Date/Time 9/5/2023 9:45 AM     Performed by  Nathalia Geronimo MD   Authorized by Arina Gamino MD           EMG bilateral lower extremity completed today.

## 2023-09-12 DIAGNOSIS — Z79.4 TYPE 2 DIABETES MELLITUS WITH HYPERGLYCEMIA, WITH LONG-TERM CURRENT USE OF INSULIN (HCC): ICD-10-CM

## 2023-09-12 DIAGNOSIS — E11.65 TYPE 2 DIABETES MELLITUS WITH HYPERGLYCEMIA, WITH LONG-TERM CURRENT USE OF INSULIN (HCC): ICD-10-CM

## 2023-09-12 RX ORDER — FLASH GLUCOSE SENSOR
KIT MISCELLANEOUS
Qty: 2 EACH | Refills: 0 | Status: SHIPPED | OUTPATIENT
Start: 2023-09-12

## 2023-09-14 DIAGNOSIS — Z79.4 TYPE 2 DIABETES MELLITUS WITH DIABETIC POLYNEUROPATHY, WITH LONG-TERM CURRENT USE OF INSULIN (HCC): ICD-10-CM

## 2023-09-14 DIAGNOSIS — E11.42 TYPE 2 DIABETES MELLITUS WITH DIABETIC POLYNEUROPATHY, WITH LONG-TERM CURRENT USE OF INSULIN (HCC): ICD-10-CM

## 2023-09-14 RX ORDER — GABAPENTIN 300 MG/1
600 CAPSULE ORAL 3 TIMES DAILY
Qty: 180 CAPSULE | Refills: 0 | Status: SHIPPED | OUTPATIENT
Start: 2023-09-14

## 2023-09-14 RX ORDER — INSULIN LISPRO 100 [IU]/ML
5 INJECTION, SOLUTION INTRAVENOUS; SUBCUTANEOUS
Qty: 15 ML | Refills: 0 | Status: SHIPPED | OUTPATIENT
Start: 2023-09-14

## 2023-09-20 ENCOUNTER — OFFICE VISIT (OUTPATIENT)
Dept: FAMILY MEDICINE CLINIC | Facility: CLINIC | Age: 48
End: 2023-09-20
Payer: OTHER GOVERNMENT

## 2023-09-20 VITALS
HEIGHT: 64 IN | TEMPERATURE: 97.6 F | HEART RATE: 67 BPM | SYSTOLIC BLOOD PRESSURE: 122 MMHG | DIASTOLIC BLOOD PRESSURE: 70 MMHG | OXYGEN SATURATION: 99 % | BODY MASS INDEX: 26.46 KG/M2 | WEIGHT: 155 LBS

## 2023-09-20 DIAGNOSIS — M79.7 FIBROMYALGIA: Primary | ICD-10-CM

## 2023-09-20 DIAGNOSIS — Z79.4 TYPE 2 DIABETES MELLITUS WITH DIABETIC POLYNEUROPATHY, WITH LONG-TERM CURRENT USE OF INSULIN (HCC): ICD-10-CM

## 2023-09-20 DIAGNOSIS — F33.2 SEVERE EPISODE OF RECURRENT MAJOR DEPRESSIVE DISORDER, WITHOUT PSYCHOTIC FEATURES (HCC): ICD-10-CM

## 2023-09-20 DIAGNOSIS — F41.1 GAD (GENERALIZED ANXIETY DISORDER): ICD-10-CM

## 2023-09-20 DIAGNOSIS — E11.42 TYPE 2 DIABETES MELLITUS WITH DIABETIC POLYNEUROPATHY, WITH LONG-TERM CURRENT USE OF INSULIN (HCC): ICD-10-CM

## 2023-09-20 PROCEDURE — 99214 OFFICE O/P EST MOD 30 MIN: CPT | Performed by: FAMILY MEDICINE

## 2023-09-20 NOTE — PROGRESS NOTES
Name: Sabina Bernard      : 1975      MRN: [id number]  Encounter Provider: Kristen Levy DO  Encounter Date: 2023   Encounter department: [address] [place] [address] [address] [facility]     1. Fibromyalgia  2. Type 2 diabetes mellitus with diabetic polyneuropathy, with long-term current use of insulin ([address])  Continue with current management. Will adjust dosing of gabapentin and follow up, if no improvement at appropriate dose then we will address weaning and/or additional treatment options. 3. CARIN (generalized anxiety disorder)  -     Ambulatory Referral to [facility]; Future    4. Severe episode of recurrent major depressive disorder, without psychotic features ([address])  -     Ambulatory Referral to [facility]; Future         Subjective      HPI     Patient presents to the office for follow up. She has been doing doing well with her blood sugar per patient,  140s fasting in the AM, pretty consistently in the 140s per patient. States that he blood sugar was high after to cortisone shots for about 3 weeks then normalized. States that her left knee did not get relief from the cortisone shot. She is taking gabapentin 900 mg, 4 times per day. Notes that she continues with the nerve pains, has follow up with neurology soon per patient.      PHQ-2/9 Depression Screening    Little interest or pleasure in doing things: 3 - nearly every day  Feeling down, depressed, or hopeless: 3 - nearly every day  Trouble falling or staying asleep, or sleeping too much: 3 - nearly every day  Feeling tired or having little energy: 3 - nearly every day  Poor appetite or overeatin - several days  Feeling bad about yourself - or that you are a failure or have let yourself or your family down: 1 - several days  Trouble concentrating on things, such as reading the newspaper or watching television: 3 - nearly every day  Moving or speaking so slowly that other people could have noticed. Or the opposite - being so fidgety or restless that you have been moving around a lot more than usual: 3 - nearly every day  Thoughts that you would be better off dead, or of hurting yourself in some way: 0 - not at all  PHQ-2 Score: 6  PHQ-2 Interpretation: POSITIVE depression screen  PHQ-9 Score: 20   PHQ-9 Interpretation: Severe depression        CARIN-7 Flowsheet Screening    Flowsheet Row Most Recent Value   Over the last 2 weeks, how often have you been bothered by any of the following problems? Feeling nervous, anxious, or on edge 3   Not being able to stop or control worrying 3   Worrying too much about different things 3   Trouble relaxing 3   Being so restless that it is hard to sit still 3   Becoming easily annoyed or irritable 3   Feeling afraid as if something awful might happen 1   CARIN-7 Total Score 19            Review of Systems    Current Outpatient Medications on File Prior to Visit   Medication Sig   • albuterol (PROVENTIL HFA,VENTOLIN HFA) 90 mcg/act inhaler Inhale 1 puff every 4 (four) hours as needed for wheezing   • amitriptyline (ELAVIL) 50 mg tablet Take 50 mg by mouth daily at bedtime   • ARIPiprazole (ABILIFY) 2 mg tablet Take 2 mg by mouth every morning   • atorvastatin (LIPITOR) 80 mg tablet Take 1 tablet (80 mg total) by mouth daily at bedtime   • B-D ULTRAFINE III SHORT PEN 31G X 8 MM MISC USE AS DIRECTED   • Blood Glucose Monitoring Suppl (ONE TOUCH ULTRA 2) w/Device KIT Inject into the skin in the morning As directed   • colestipol (COLESTID) 1 g tablet Take 1 tablet (1 g total) by mouth 3 (three) times a day   • Continuous Blood Gluc  (FreeStyle Elaina 2 [place]) BENEDICT Use to monitor blood sugar 4 times per day.    • Continuous Blood Gluc Sensor (FreeStyle Elaina 14 Day Sensor) MISC USE 4 TIMES DAILY FOR 14 DAYS - THEN REPLACE   • Continuous Blood Gluc Sensor (FreeStyle Elaina 2 Sensor) MISC USE TO CHECK BLOOD SUGAR 4 TIMES DAILY REPLACE  EVERY  14  DAYS   • Diclofenac Sodium (VOLTAREN) 1 % Apply 2 g topically 4 (four) times a day   • dicyclomine (BENTYL) 20 mg tablet Take 1 tablet (20 mg total) by mouth every 6 (six) hours as needed (abdominal pain)   • diphenoxylate-atropine (LOMOTIL) 2.5-0.025 mg per tablet    • Empagliflozin 25 MG TABS Take 1 tablet (25 mg total) by mouth every morning   • fremanezumab-vfrm (Ajovy) 225 MG/1.5ML prefilled syringe Inject 1.5 mL (225 mg total) under the skin every 30 (thirty) days   • gabapentin (NEURONTIN) 300 mg capsule TAKE 2 CAPSULES BY MOUTH THREE TIMES DAILY   • Galcanezumab-gnlm 120 MG/ML SOAJ 1 pen every 30 days for maintenance dose. Do not start before September 1, 2023. • glucose blood test strip Check blood sugar four times at day. • hydrocortisone 2.5 % cream Apply topically 3 (three) times a day   • Insulin Glargine Solostar (Lantus SoloStar) 100 UNIT/ML SOPN Inject 0.45 mL (45 Units total) under the skin daily at bedtime   • insulin lispro (HumaLOG KwikPen) 100 units/mL injection pen Inject 5 Units under the skin 3 (three) times a day with meals   • Lancets (onetouch ultrasoft) lancets Check blood sugar four times at day.    • metFORMIN (GLUCOPHAGE) 1000 MG tablet Take 1 tablet (1,000 mg total) by mouth 2 (two) times a day with meals   • montelukast (SINGULAIR) 10 mg tablet Take 1 tablet (10 mg total) by mouth daily at bedtime   • ondansetron (ZOFRAN) 4 mg tablet Take 1 tablet (4 mg total) by mouth every 8 (eight) hours as needed for nausea or vomiting   • topiramate (Topamax) 50 MG tablet Take 1 tablet (50 mg total) by mouth 2 (two) times a day   • traZODone (DESYREL) 50 mg tablet Take 50 mg by mouth daily at bedtime   • triamcinolone (KENALOG) 0.1 % ointment Apply topically 2 (two) times a day     Objective     /70 (BP Location: Left arm, Patient Position: Sitting, Cuff Size: Adult)   Pulse 67   Temp 97.6 °F (36.4 °C) (Tympanic)   Ht 5' 4" (1.626 m)   Wt 70.3 kg (155 lb)   LMP 07/12/2023   SpO2 99%   BMI 26.61 kg/m²     Physical Exam  Vitals reviewed. Constitutional:       General: She is not in acute distress. Appearance: Normal appearance. HENT:      Head: Normocephalic and atraumatic. Right Ear: External ear normal.      Left Ear: External ear normal.      Nose: Nose normal.      Mouth/Throat:      Mouth: Mucous membranes are moist.   Eyes:      Extraocular Movements: Extraocular movements intact. Conjunctiva/sclera: Conjunctivae normal.   Cardiovascular:      Rate and Rhythm: Normal rate and regular rhythm. Heart sounds: Normal heart sounds. Pulmonary:      Effort: Pulmonary effort is normal.      Breath sounds: Normal breath sounds. No wheezing, rhonchi or rales. Abdominal:      General: Bowel sounds are normal.      Palpations: Abdomen is soft. Musculoskeletal:      Right lower leg: No edema. Left lower leg: No edema. Skin:     General: Skin is warm. Capillary Refill: Capillary refill takes less than 2 seconds. Findings: No rash. Neurological:      Mental Status: She is alert. Mental status is at baseline.           Mart Acosta DO

## 2023-09-22 DIAGNOSIS — J45.40 MODERATE PERSISTENT ASTHMA WITHOUT COMPLICATION: ICD-10-CM

## 2023-09-22 DIAGNOSIS — J30.2 SEASONAL ALLERGIES: ICD-10-CM

## 2023-09-22 RX ORDER — MONTELUKAST SODIUM 10 MG/1
10 TABLET ORAL
Qty: 30 TABLET | Refills: 0 | Status: SHIPPED | OUTPATIENT
Start: 2023-09-22

## 2023-09-26 DIAGNOSIS — G43.019 INTRACTABLE MIGRAINE WITHOUT AURA AND WITHOUT STATUS MIGRAINOSUS: ICD-10-CM

## 2023-09-26 RX ORDER — TOPIRAMATE 50 MG/1
50 TABLET, FILM COATED ORAL 2 TIMES DAILY
Qty: 60 TABLET | Refills: 0 | Status: SHIPPED | OUTPATIENT
Start: 2023-09-26

## 2023-10-11 ENCOUNTER — TELEPHONE (OUTPATIENT)
Dept: FAMILY MEDICINE CLINIC | Facility: CLINIC | Age: 48
End: 2023-10-11

## 2023-10-11 NOTE — TELEPHONE ENCOUNTER
T/c from [facility] Pre Surgery Encounter Dept -- have been faxing request to office for EKG for pts upcoming surgery (fax not received). Pt and faxed stress test results, as EKG is  available in those records.

## 2023-10-13 DIAGNOSIS — Z79.4 TYPE 2 DIABETES MELLITUS WITH HYPERGLYCEMIA, WITH LONG-TERM CURRENT USE OF INSULIN (HCC): ICD-10-CM

## 2023-10-13 DIAGNOSIS — E11.65 TYPE 2 DIABETES MELLITUS WITH HYPERGLYCEMIA, WITH LONG-TERM CURRENT USE OF INSULIN (HCC): ICD-10-CM

## 2023-10-13 RX ORDER — FLASH GLUCOSE SENSOR
KIT MISCELLANEOUS
Qty: 2 EACH | Refills: 0 | Status: SHIPPED | OUTPATIENT
Start: 2023-10-13

## 2023-10-17 DIAGNOSIS — G43.019 INTRACTABLE MIGRAINE WITHOUT AURA AND WITHOUT STATUS MIGRAINOSUS: ICD-10-CM

## 2023-10-17 RX ORDER — TOPIRAMATE 50 MG/1
50 TABLET, FILM COATED ORAL 2 TIMES DAILY
Qty: 60 TABLET | Refills: 0 | Status: SHIPPED | OUTPATIENT
Start: 2023-10-17

## 2023-10-26 DIAGNOSIS — Z79.4 TYPE 2 DIABETES MELLITUS WITH DIABETIC POLYNEUROPATHY, WITH LONG-TERM CURRENT USE OF INSULIN (HCC): ICD-10-CM

## 2023-10-26 DIAGNOSIS — E11.42 TYPE 2 DIABETES MELLITUS WITH DIABETIC POLYNEUROPATHY, WITH LONG-TERM CURRENT USE OF INSULIN (HCC): ICD-10-CM

## 2023-10-26 RX ORDER — PEN NEEDLE, DIABETIC 31 GX5/16"
NEEDLE, DISPOSABLE MISCELLANEOUS
Qty: 100 EACH | Refills: 0 | Status: SHIPPED | OUTPATIENT
Start: 2023-10-26

## 2023-11-02 ENCOUNTER — OFFICE VISIT (OUTPATIENT)
Dept: FAMILY MEDICINE CLINIC | Facility: CLINIC | Age: 48
End: 2023-11-02
Payer: OTHER GOVERNMENT

## 2023-11-02 VITALS
BODY MASS INDEX: 26.98 KG/M2 | WEIGHT: 158 LBS | OXYGEN SATURATION: 98 % | SYSTOLIC BLOOD PRESSURE: 102 MMHG | TEMPERATURE: 97.7 F | HEART RATE: 72 BPM | HEIGHT: 64 IN | DIASTOLIC BLOOD PRESSURE: 64 MMHG

## 2023-11-02 DIAGNOSIS — E11.42 TYPE 2 DIABETES MELLITUS WITH DIABETIC POLYNEUROPATHY, WITH LONG-TERM CURRENT USE OF INSULIN (HCC): Primary | ICD-10-CM

## 2023-11-02 DIAGNOSIS — Z79.4 TYPE 2 DIABETES MELLITUS WITH DIABETIC POLYNEUROPATHY, WITH LONG-TERM CURRENT USE OF INSULIN (HCC): Primary | ICD-10-CM

## 2023-11-02 PROCEDURE — 99213 OFFICE O/P EST LOW 20 MIN: CPT | Performed by: FAMILY MEDICINE

## 2023-11-02 NOTE — PROGRESS NOTES
Assessment/Plan:    No problem-specific Assessment & Plan notes found for this encounter. Diagnoses and all orders for this visit:    Type 2 diabetes mellitus with diabetic polyneuropathy, with long-term current use of insulin (HCC)  Hgb A1c of 5.7, am glucose of 80. Will decrease to 45 units Lantus nightly. Will repeat in 3 months. Subjective:      Patient ID: Ciera Zendejas is a 53 y.o. female. Diabetes        Hgb A1c in office today of 5.7. Patient is on insulin, she is doing 50 units of Lantus nightly and 5 units of Novolog BID with meals. The following portions of the patient's history were reviewed and updated as appropriate: allergies, current medications, past family history, past medical history, past social history, past surgical history, and problem list.    Review of Systems      Objective:    /64   Pulse 72   Temp 97.7 °F (36.5 °C)   Ht 5' 4" (1.626 m)   Wt 71.7 kg (158 lb)   SpO2 98%   BMI 27.12 kg/m²      Physical Exam  Vitals reviewed. Constitutional:       General: She is not in acute distress. Appearance: Normal appearance. HENT:      Head: Normocephalic and atraumatic. Right Ear: External ear normal.      Left Ear: External ear normal.      Nose: Nose normal.      Mouth/Throat:      Mouth: Mucous membranes are moist.   Eyes:      Extraocular Movements: Extraocular movements intact. Conjunctiva/sclera: Conjunctivae normal.   Cardiovascular:      Rate and Rhythm: Normal rate and regular rhythm. Pulmonary:      Effort: Pulmonary effort is normal.   Skin:     Capillary Refill: Capillary refill takes less than 2 seconds. Neurological:      Mental Status: She is alert. Mental status is at baseline.          Tim Flores [facility]  11/2/2023 2:55 PM

## 2023-12-04 ENCOUNTER — TELEPHONE (OUTPATIENT)
Dept: NEUROLOGY | Facility: CLINIC | Age: 48
End: 2023-12-04

## 2023-12-04 NOTE — TELEPHONE ENCOUNTER
12/1/23 at 1:08    Good afternoon, this is Edwige and I'm calling from [facility] in regards to a mutual patient, Kelly Berry, date of birth, 1975 calling because we needed to obtain an alternate number to reach the patient. The phone number we have on file is not in service. If you could just give us a call back at your earliest convenience. Our number is [phone] option 2.  Thank you

## 2023-12-10 DIAGNOSIS — E11.42 TYPE 2 DIABETES MELLITUS WITH DIABETIC POLYNEUROPATHY, WITH LONG-TERM CURRENT USE OF INSULIN (HCC): ICD-10-CM

## 2023-12-10 DIAGNOSIS — R19.7 DIARRHEA, UNSPECIFIED TYPE: ICD-10-CM

## 2023-12-10 DIAGNOSIS — Z79.4 TYPE 2 DIABETES MELLITUS WITH DIABETIC POLYNEUROPATHY, WITH LONG-TERM CURRENT USE OF INSULIN (HCC): ICD-10-CM

## 2023-12-10 DIAGNOSIS — E11.65 TYPE 2 DIABETES MELLITUS WITH HYPERGLYCEMIA, WITH LONG-TERM CURRENT USE OF INSULIN (HCC): ICD-10-CM

## 2023-12-10 DIAGNOSIS — Z79.4 TYPE 2 DIABETES MELLITUS WITH HYPERGLYCEMIA, WITH LONG-TERM CURRENT USE OF INSULIN (HCC): ICD-10-CM

## 2023-12-11 RX ORDER — FLASH GLUCOSE SENSOR
KIT MISCELLANEOUS
Qty: 2 EACH | Refills: 0 | Status: SHIPPED | OUTPATIENT
Start: 2023-12-11

## 2023-12-11 RX ORDER — MONTELUKAST SODIUM 4 MG/1
1 TABLET, CHEWABLE ORAL 3 TIMES DAILY
Qty: 90 TABLET | Refills: 5 | Status: SHIPPED | OUTPATIENT
Start: 2023-12-11

## 2023-12-11 RX ORDER — INSULIN GLARGINE 100 [IU]/ML
45 INJECTION, SOLUTION SUBCUTANEOUS
Qty: 42 ML | Refills: 0 | Status: SHIPPED | OUTPATIENT
Start: 2023-12-11

## 2023-12-11 RX ORDER — PEN NEEDLE, DIABETIC 31 GX5/16"
NEEDLE, DISPOSABLE MISCELLANEOUS
Qty: 100 EACH | Refills: 0 | Status: SHIPPED | OUTPATIENT
Start: 2023-12-11

## 2023-12-11 RX ORDER — INSULIN LISPRO 100 [IU]/ML
5 INJECTION, SOLUTION INTRAVENOUS; SUBCUTANEOUS
Qty: 15 ML | Refills: 0 | Status: SHIPPED | OUTPATIENT
Start: 2023-12-11

## 2023-12-20 ENCOUNTER — TELEPHONE (OUTPATIENT)
Dept: FAMILY MEDICINE CLINIC | Facility: CLINIC | Age: 48
End: 2023-12-20

## 2023-12-20 NOTE — TELEPHONE ENCOUNTER
Meagan called from [facility] supply - will be faxing over a script for medical supplies - requires DO signature.     Fax # provided     [phone]

## 2025-01-23 NOTE — MEDICAL STUDENT
Progress Note:    Diarrhea  · Resolve, no bowel movement for 4 days  Abdominal pain  · Gastroscope 10/24  Revealed colitis and polyps  · EGD 10/26  non-erosive gastritis, H  Pylori biopsy, results pending  · Tapered pain medication, Discontinue Dilauded  · Gastroenterology folowing,   · Colonoscopy on 10/24 -15 mm polyp removed  · T  Bili elevated on admission, on 10/26 0 60  · Cultures neg for giarda and OVA and parasites,    · Completed course of cipro  ·     Hepatic Lesion  · CT abdoment 10/19 - "There is mild fatty infiltration of the liver  There are 2 lesions in the right lobe of the liver, measuring 2 3 x 1 7 cm and 1 8 x 1 cm, respectively  These may represent hemangiomas  Nonemergent outpatient follow-up is suggested "  · Follow up outpatient    DMII  · AIC 9 5  · Home meds: metformin and lantus, continue lantus, hold metformin  · SSI  · Educated in better management of diabetes with diet and increase exercise  Subjective: Patient is 44year old female with PMH of DMII, fibromyalgia and asthma  Patient presented to hospital with abdominal pain, nausea and vomiting for 5 days  She denies personal history or family history of IBS or IBD  Patient stated that she has tolerated her diet well  Reported pain to upper right quadrant 8/10  She stated she has been constipated and her last bowel movement was four days ago  Discussed with patient that narcotics will constipate by slowing motility  Discussed possible discharge home and instructed on outpatient follow up 
Apixaban/Eliquis increases your risk for bleeding. Notify your doctor if you experience any of the following side effects: bleeding, coughing or vomiting blood, red or black stool, unexpected pain or swelling, itching or hives, chest pain, chest tightness, trouble breathing, changes in how much or how often you urinate, red or pink urine, numbness or tingling in your feet, or unusual muscle weakness. When Apixaban/Eliquis is taken with other medicines, they can affect how it works. Taking other medications such as aspirin, blood thinners, nonsteroidal anti-inflammatories, and medications that treat depression can increase your risk of bleeding. It is very important to tell your health care provider about all of the other medicines, including over-the-counter medications, herbs, and vitamins you are taking. DO NOT start, stop, or change the dosage of any medicine, including over-the-counter medicines, vitamins, and herbal products without your doctor’s approval. Any products containing aspirin or are nonsteroidal anti-inflammatories lessen the blood’s ability to form clots and add to the effect of Apixaban/Eliquis. Never take aspirin or medicines that contain aspirin without speaking to your doctor.

## (undated) DEVICE — WORKING LENGTH 235CM, WORKING CHANNEL 2.8MM: Brand: RESOLUTION 360 CLIP